# Patient Record
Sex: FEMALE | Race: BLACK OR AFRICAN AMERICAN | Employment: FULL TIME | URBAN - METROPOLITAN AREA
[De-identification: names, ages, dates, MRNs, and addresses within clinical notes are randomized per-mention and may not be internally consistent; named-entity substitution may affect disease eponyms.]

---

## 2017-01-27 RX ORDER — ERGOCALCIFEROL 1.25 MG/1
CAPSULE ORAL
Qty: 4 CAPSULE | Refills: 3 | Status: SHIPPED | OUTPATIENT
Start: 2017-01-27 | End: 2017-11-02

## 2017-04-26 ENCOUNTER — OFFICE VISIT (OUTPATIENT)
Dept: PRIMARY CARE CLINIC | Age: 52
End: 2017-04-26

## 2017-04-26 VITALS
HEART RATE: 115 BPM | OXYGEN SATURATION: 99 % | WEIGHT: 276 LBS | DIASTOLIC BLOOD PRESSURE: 80 MMHG | BODY MASS INDEX: 43.23 KG/M2 | SYSTOLIC BLOOD PRESSURE: 150 MMHG

## 2017-04-26 DIAGNOSIS — I10 ESSENTIAL HYPERTENSION: Primary | ICD-10-CM

## 2017-04-26 DIAGNOSIS — E55.9 VITAMIN D DEFICIENCY: ICD-10-CM

## 2017-04-26 DIAGNOSIS — Z13.9 SCREENING: ICD-10-CM

## 2017-04-26 DIAGNOSIS — E66.01 MORBID OBESITY DUE TO EXCESS CALORIES (HCC): ICD-10-CM

## 2017-04-26 DIAGNOSIS — D64.9 ANEMIA, UNSPECIFIED TYPE: ICD-10-CM

## 2017-04-26 DIAGNOSIS — E11.69 TYPE 2 DIABETES MELLITUS WITH OTHER SPECIFIED COMPLICATION (HCC): ICD-10-CM

## 2017-04-26 LAB — HBA1C MFR BLD: 10.2 %

## 2017-04-26 PROCEDURE — 99214 OFFICE O/P EST MOD 30 MIN: CPT | Performed by: INTERNAL MEDICINE

## 2017-04-26 PROCEDURE — 83036 HEMOGLOBIN GLYCOSYLATED A1C: CPT | Performed by: INTERNAL MEDICINE

## 2017-04-26 RX ORDER — LOSARTAN POTASSIUM AND HYDROCHLOROTHIAZIDE 12.5; 5 MG/1; MG/1
1 TABLET ORAL DAILY
Qty: 30 TABLET | Refills: 3 | Status: SHIPPED | OUTPATIENT
Start: 2017-04-26 | End: 2017-09-09 | Stop reason: SDUPTHER

## 2017-04-26 RX ORDER — GLIMEPIRIDE 4 MG/1
4 TABLET ORAL 2 TIMES DAILY
Qty: 60 TABLET | Refills: 5 | Status: SHIPPED | OUTPATIENT
Start: 2017-04-26 | End: 2017-11-02

## 2017-04-26 RX ORDER — ASPIRIN 81 MG/1
81 TABLET ORAL DAILY
Qty: 90 TABLET | Refills: 3 | Status: SHIPPED | OUTPATIENT
Start: 2017-04-26 | End: 2018-05-14 | Stop reason: SDUPTHER

## 2017-04-26 ASSESSMENT — PATIENT HEALTH QUESTIONNAIRE - PHQ9
2. FEELING DOWN, DEPRESSED OR HOPELESS: 0
1. LITTLE INTEREST OR PLEASURE IN DOING THINGS: 0
SUM OF ALL RESPONSES TO PHQ9 QUESTIONS 1 & 2: 0
SUM OF ALL RESPONSES TO PHQ QUESTIONS 1-9: 0

## 2017-04-26 ASSESSMENT — ENCOUNTER SYMPTOMS
EYES NEGATIVE: 1
ABDOMINAL PAIN: 0
WHEEZING: 0
CONSTIPATION: 1
ABDOMINAL DISTENTION: 0
CHEST TIGHTNESS: 0
COUGH: 0
BLOOD IN STOOL: 0
SHORTNESS OF BREATH: 0

## 2017-04-28 LAB — HIV-1 WESTERN BLOT: NEGATIVE

## 2017-05-17 DIAGNOSIS — I10 ESSENTIAL HYPERTENSION: ICD-10-CM

## 2017-05-17 RX ORDER — HYDROCHLOROTHIAZIDE 25 MG/1
TABLET ORAL
Qty: 30 TABLET | Refills: 3 | OUTPATIENT
Start: 2017-05-17

## 2017-06-06 ENCOUNTER — OFFICE VISIT (OUTPATIENT)
Dept: GYNECOLOGY | Age: 52
End: 2017-06-06

## 2017-06-06 VITALS
BODY MASS INDEX: 44.16 KG/M2 | SYSTOLIC BLOOD PRESSURE: 131 MMHG | DIASTOLIC BLOOD PRESSURE: 79 MMHG | TEMPERATURE: 98.3 F | HEIGHT: 67 IN | HEART RATE: 106 BPM | WEIGHT: 281.38 LBS | RESPIRATION RATE: 17 BRPM

## 2017-06-06 DIAGNOSIS — R82.90 ABNORMAL URINE ODOR: ICD-10-CM

## 2017-06-06 DIAGNOSIS — Z01.419 WELL WOMAN EXAM WITH ROUTINE GYNECOLOGICAL EXAM: Primary | ICD-10-CM

## 2017-06-06 DIAGNOSIS — D25.1 INTRAMURAL LEIOMYOMA OF UTERUS: ICD-10-CM

## 2017-06-06 DIAGNOSIS — Z30.49 ENCOUNTER FOR SURVEILLANCE OF OTHER CONTRACEPTIVE: ICD-10-CM

## 2017-06-06 PROCEDURE — 99396 PREV VISIT EST AGE 40-64: CPT | Performed by: OBSTETRICS & GYNECOLOGY

## 2017-06-06 RX ORDER — SULFAMETHOXAZOLE AND TRIMETHOPRIM 800; 160 MG/1; MG/1
1 TABLET ORAL 2 TIMES DAILY
Qty: 14 TABLET | Refills: 0 | Status: SHIPPED | OUTPATIENT
Start: 2017-06-06 | End: 2017-06-13

## 2017-06-06 RX ORDER — ACETAMINOPHEN AND CODEINE PHOSPHATE 120; 12 MG/5ML; MG/5ML
1 SOLUTION ORAL DAILY
Qty: 28 TABLET | Refills: 11 | Status: SHIPPED | OUTPATIENT
Start: 2017-06-06 | End: 2017-11-02

## 2017-06-08 LAB
HPV COMMENT: NORMAL
HPV TYPE 16: NOT DETECTED
HPV TYPE 18: NOT DETECTED
HPVOH (OTHER TYPES): NOT DETECTED

## 2017-06-09 LAB
ORGANISM: ABNORMAL
URINE CULTURE, ROUTINE: ABNORMAL

## 2017-06-10 ASSESSMENT — ENCOUNTER SYMPTOMS
GASTROINTESTINAL NEGATIVE: 1
EYES NEGATIVE: 1
RESPIRATORY NEGATIVE: 1

## 2017-06-19 ENCOUNTER — OFFICE VISIT (OUTPATIENT)
Dept: ORTHOPEDIC SURGERY | Age: 52
End: 2017-06-19

## 2017-06-19 ENCOUNTER — HOSPITAL ENCOUNTER (OUTPATIENT)
Dept: GENERAL RADIOLOGY | Facility: MEDICAL CENTER | Age: 52
Discharge: HOME | End: 2017-06-19
Attending: ORTHOPAEDIC SURGERY | Admitting: ORTHOPAEDIC SURGERY

## 2017-06-19 VITALS
HEIGHT: 67 IN | BODY MASS INDEX: 42.38 KG/M2 | WEIGHT: 270 LBS | DIASTOLIC BLOOD PRESSURE: 85 MMHG | SYSTOLIC BLOOD PRESSURE: 138 MMHG | HEART RATE: 104 BPM

## 2017-06-19 DIAGNOSIS — E66.01 MORBID OBESITY WITH BMI OF 40.0-44.9, ADULT (HCC): ICD-10-CM

## 2017-06-19 DIAGNOSIS — M77.8 CAPSULITIS OF LEFT SHOULDER: ICD-10-CM

## 2017-06-19 DIAGNOSIS — M75.122 COMPLETE TEAR OF LEFT ROTATOR CUFF: ICD-10-CM

## 2017-06-19 DIAGNOSIS — M75.22 BICEPS TENDINITIS ON LEFT: ICD-10-CM

## 2017-06-19 DIAGNOSIS — M25.512 ACUTE PAIN OF LEFT SHOULDER: Primary | ICD-10-CM

## 2017-06-19 DIAGNOSIS — M25.512 ACUTE PAIN OF LEFT SHOULDER: ICD-10-CM

## 2017-06-19 DIAGNOSIS — M19.012 ARTHRITIS OF LEFT ACROMIOCLAVICULAR JOINT: ICD-10-CM

## 2017-06-19 DIAGNOSIS — S46.212S: ICD-10-CM

## 2017-06-19 DIAGNOSIS — Z91.81 STATUS POST FALL: ICD-10-CM

## 2017-06-19 PROBLEM — S46.219A RUPTURE OF BICEPS TENDON: Status: ACTIVE | Noted: 2017-06-19

## 2017-06-19 PROCEDURE — 73030 X-RAY EXAM OF SHOULDER: CPT | Performed by: ORTHOPAEDIC SURGERY

## 2017-06-19 PROCEDURE — 99203 OFFICE O/P NEW LOW 30 MIN: CPT | Performed by: ORTHOPAEDIC SURGERY

## 2017-08-10 RX ORDER — NORETHINDRONE 0.35 MG/1
TABLET ORAL
Qty: 28 TABLET | Refills: 3 | Status: SHIPPED | OUTPATIENT
Start: 2017-08-10 | End: 2017-11-02

## 2017-08-29 RX ORDER — ERGOCALCIFEROL 1.25 MG/1
CAPSULE ORAL
Qty: 4 CAPSULE | Refills: 1 | Status: SHIPPED | OUTPATIENT
Start: 2017-08-29 | End: 2017-11-02

## 2017-09-09 DIAGNOSIS — E11.69 TYPE 2 DIABETES MELLITUS WITH OTHER SPECIFIED COMPLICATION (HCC): ICD-10-CM

## 2017-09-09 DIAGNOSIS — I10 ESSENTIAL HYPERTENSION: ICD-10-CM

## 2017-09-13 RX ORDER — LOSARTAN POTASSIUM AND HYDROCHLOROTHIAZIDE 12.5; 5 MG/1; MG/1
TABLET ORAL
Qty: 30 TABLET | Refills: 3 | Status: SHIPPED | OUTPATIENT
Start: 2017-09-13 | End: 2017-11-02

## 2017-10-23 ENCOUNTER — HOSPITAL ENCOUNTER (OUTPATIENT)
Dept: MAMMOGRAPHY | Age: 52
Discharge: HOME | End: 2017-10-23
Attending: INTERNAL MEDICINE | Admitting: INTERNAL MEDICINE

## 2017-10-23 DIAGNOSIS — Z12.31 VISIT FOR SCREENING MAMMOGRAM: ICD-10-CM

## 2017-11-02 ENCOUNTER — OFFICE VISIT (OUTPATIENT)
Dept: PRIMARY CARE CLINIC | Age: 52
End: 2017-11-02

## 2017-11-02 VITALS
WEIGHT: 271 LBS | DIASTOLIC BLOOD PRESSURE: 85 MMHG | HEIGHT: 68 IN | SYSTOLIC BLOOD PRESSURE: 160 MMHG | HEART RATE: 102 BPM | TEMPERATURE: 97.9 F | BODY MASS INDEX: 41.07 KG/M2 | OXYGEN SATURATION: 96 %

## 2017-11-02 DIAGNOSIS — Z12.31 ENCOUNTER FOR SCREENING MAMMOGRAM FOR HIGH-RISK PATIENT: ICD-10-CM

## 2017-11-02 DIAGNOSIS — Z79.4 TYPE 2 DIABETES MELLITUS WITH OTHER SPECIFIED COMPLICATION, WITH LONG-TERM CURRENT USE OF INSULIN (HCC): Primary | ICD-10-CM

## 2017-11-02 DIAGNOSIS — Z23 NEED FOR TDAP VACCINATION: ICD-10-CM

## 2017-11-02 DIAGNOSIS — Z23 NEED FOR PNEUMOCOCCAL VACCINE: ICD-10-CM

## 2017-11-02 DIAGNOSIS — Z79.4 TYPE 2 DIABETES MELLITUS WITH OTHER SPECIFIED COMPLICATION, WITH LONG-TERM CURRENT USE OF INSULIN (HCC): ICD-10-CM

## 2017-11-02 DIAGNOSIS — E11.69 TYPE 2 DIABETES MELLITUS WITH OTHER SPECIFIED COMPLICATION, WITH LONG-TERM CURRENT USE OF INSULIN (HCC): Primary | ICD-10-CM

## 2017-11-02 DIAGNOSIS — E11.69 TYPE 2 DIABETES MELLITUS WITH OTHER SPECIFIED COMPLICATION, WITH LONG-TERM CURRENT USE OF INSULIN (HCC): ICD-10-CM

## 2017-11-02 DIAGNOSIS — I10 ESSENTIAL HYPERTENSION: ICD-10-CM

## 2017-11-02 LAB
A/G RATIO: 1.3 (ref 1.1–2.2)
ALBUMIN SERPL-MCNC: 4.4 G/DL (ref 3.4–5)
ALP BLD-CCNC: 149 U/L (ref 40–129)
ALT SERPL-CCNC: 22 U/L (ref 10–40)
ANION GAP SERPL CALCULATED.3IONS-SCNC: 21 MMOL/L (ref 3–16)
AST SERPL-CCNC: 16 U/L (ref 15–37)
BASOPHILS ABSOLUTE: 0.1 K/UL (ref 0–0.2)
BASOPHILS RELATIVE PERCENT: 1.3 %
BILIRUB SERPL-MCNC: 0.3 MG/DL (ref 0–1)
BILIRUBIN URINE: NEGATIVE
BLOOD, URINE: NEGATIVE
BUN BLDV-MCNC: 12 MG/DL (ref 7–20)
CALCIUM SERPL-MCNC: 9.8 MG/DL (ref 8.3–10.6)
CHLORIDE BLD-SCNC: 96 MMOL/L (ref 99–110)
CHOLESTEROL, TOTAL: 172 MG/DL (ref 0–199)
CLARITY: CLEAR
CO2: 22 MMOL/L (ref 21–32)
COLOR: YELLOW
CREAT SERPL-MCNC: 0.6 MG/DL (ref 0.6–1.1)
CREATININE URINE: 25.4 MG/DL (ref 28–259)
EOSINOPHILS ABSOLUTE: 0.4 K/UL (ref 0–0.6)
EOSINOPHILS RELATIVE PERCENT: 4.8 %
GFR AFRICAN AMERICAN: >60
GFR NON-AFRICAN AMERICAN: >60
GLOBULIN: 3.3 G/DL
GLUCOSE BLD-MCNC: 169 MG/DL (ref 70–99)
GLUCOSE URINE: 100 MG/DL
HBA1C MFR BLD: 10.2 %
HCT VFR BLD CALC: 33.1 % (ref 36–48)
HDLC SERPL-MCNC: 63 MG/DL (ref 40–60)
HEMOGLOBIN: 10.5 G/DL (ref 12–16)
KETONES, URINE: NEGATIVE MG/DL
LDL CHOLESTEROL CALCULATED: 83 MG/DL
LEUKOCYTE ESTERASE, URINE: NEGATIVE
LYMPHOCYTES ABSOLUTE: 1.7 K/UL (ref 1–5.1)
LYMPHOCYTES RELATIVE PERCENT: 21.7 %
MCH RBC QN AUTO: 24.3 PG (ref 26–34)
MCHC RBC AUTO-ENTMCNC: 31.7 G/DL (ref 31–36)
MCV RBC AUTO: 76.5 FL (ref 80–100)
MICROALBUMIN UR-MCNC: 1.3 MG/DL
MICROALBUMIN/CREAT UR-RTO: 51.2 MG/G (ref 0–30)
MICROSCOPIC EXAMINATION: ABNORMAL
MONOCYTES ABSOLUTE: 0.5 K/UL (ref 0–1.3)
MONOCYTES RELATIVE PERCENT: 6.1 %
NEUTROPHILS ABSOLUTE: 5.1 K/UL (ref 1.7–7.7)
NEUTROPHILS RELATIVE PERCENT: 66.1 %
NITRITE, URINE: NEGATIVE
PDW BLD-RTO: 16.2 % (ref 12.4–15.4)
PH UA: 6
PLATELET # BLD: 340 K/UL (ref 135–450)
PMV BLD AUTO: 9.6 FL (ref 5–10.5)
POTASSIUM SERPL-SCNC: 4.5 MMOL/L (ref 3.5–5.1)
PROTEIN UA: NEGATIVE MG/DL
RBC # BLD: 4.33 M/UL (ref 4–5.2)
SODIUM BLD-SCNC: 139 MMOL/L (ref 136–145)
SPECIFIC GRAVITY UA: 1
TOTAL PROTEIN: 7.7 G/DL (ref 6.4–8.2)
TRIGL SERPL-MCNC: 129 MG/DL (ref 0–150)
TSH SERPL DL<=0.05 MIU/L-ACNC: 1.08 UIU/ML (ref 0.27–4.2)
URINE TYPE: ABNORMAL
UROBILINOGEN, URINE: 0.2 E.U./DL
VLDLC SERPL CALC-MCNC: 26 MG/DL
WBC # BLD: 7.6 K/UL (ref 4–11)

## 2017-11-02 PROCEDURE — 3046F HEMOGLOBIN A1C LEVEL >9.0%: CPT | Performed by: INTERNAL MEDICINE

## 2017-11-02 PROCEDURE — 3014F SCREEN MAMMO DOC REV: CPT | Performed by: INTERNAL MEDICINE

## 2017-11-02 PROCEDURE — G8427 DOCREV CUR MEDS BY ELIG CLIN: HCPCS | Performed by: INTERNAL MEDICINE

## 2017-11-02 PROCEDURE — 3017F COLORECTAL CA SCREEN DOC REV: CPT | Performed by: INTERNAL MEDICINE

## 2017-11-02 PROCEDURE — 90732 PPSV23 VACC 2 YRS+ SUBQ/IM: CPT | Performed by: INTERNAL MEDICINE

## 2017-11-02 PROCEDURE — 90715 TDAP VACCINE 7 YRS/> IM: CPT | Performed by: INTERNAL MEDICINE

## 2017-11-02 PROCEDURE — G8482 FLU IMMUNIZE ORDER/ADMIN: HCPCS | Performed by: INTERNAL MEDICINE

## 2017-11-02 PROCEDURE — 1036F TOBACCO NON-USER: CPT | Performed by: INTERNAL MEDICINE

## 2017-11-02 PROCEDURE — 83036 HEMOGLOBIN GLYCOSYLATED A1C: CPT | Performed by: INTERNAL MEDICINE

## 2017-11-02 PROCEDURE — 90471 IMMUNIZATION ADMIN: CPT | Performed by: INTERNAL MEDICINE

## 2017-11-02 PROCEDURE — 99214 OFFICE O/P EST MOD 30 MIN: CPT | Performed by: INTERNAL MEDICINE

## 2017-11-02 PROCEDURE — 90472 IMMUNIZATION ADMIN EACH ADD: CPT | Performed by: INTERNAL MEDICINE

## 2017-11-02 PROCEDURE — G8417 CALC BMI ABV UP PARAM F/U: HCPCS | Performed by: INTERNAL MEDICINE

## 2017-11-02 RX ORDER — LOSARTAN POTASSIUM AND HYDROCHLOROTHIAZIDE 25; 100 MG/1; MG/1
1 TABLET ORAL DAILY
Qty: 30 TABLET | Refills: 5 | Status: SHIPPED | OUTPATIENT
Start: 2017-11-02 | End: 2018-04-13

## 2017-11-02 ASSESSMENT — ENCOUNTER SYMPTOMS
COUGH: 0
CHEST TIGHTNESS: 0
SHORTNESS OF BREATH: 0
ABDOMINAL DISTENTION: 0
CONSTIPATION: 1
BLOOD IN STOOL: 0
EYES NEGATIVE: 1
WHEEZING: 0
ABDOMINAL PAIN: 0

## 2017-11-02 NOTE — PROGRESS NOTES
Subjective:      Patient ID: Chandan Fragoso is a 47 y.o. female. 11/2/17 Patient presents with:  Diabetes  Sugars as high as 400 ! Fed up with Bydureon and other Diabetes meds b/e of no change in sugar  Hypertension: not taking Hyzaar reg b/e she read it raises sugars! did take it this am though     Goes on [employer] and likes to follow their recommendations           Last seen  4/26/17       10/5/15  Dr Mora Hills Patient  ; establish care             Hypertension   This is a chronic problem. The problem is uncontrolled. Pertinent negatives include no headaches or shortness of breath. Diabetes   She presents for her follow-up diabetic visit. She has type 2 diabetes mellitus. Her disease course has been worsening. Hypoglycemia symptoms include mood changes. Pertinent negatives for hypoglycemia include no dizziness, headaches or nervousness/anxiousness. Associated symptoms include polydipsia and polyuria. Pertinent negatives for diabetes include no fatigue and no weakness. There are no hypoglycemic complications. Symptoms are worsening. She is compliant with treatment most of the time. She participates in exercise intermittently. An ACE inhibitor/angiotensin II receptor blocker is being taken. She does not see a podiatrist.Eye exam is current. Review of Systems   Constitutional: Negative for activity change, appetite change, fatigue, fever and unexpected weight change. Tdap 11/17     Pneumonia vac 11/17        Flu vac reg at work  10/17    HENT: Negative. Dental ex reg     Eyes: Negative. Eye ex  11/16 ; [facility]   Respiratory: Negative for cough, chest tightness, shortness of breath and wheezing. Quit smoking 6/15   Rare Etoh ; No rec drugs   Cardiovascular: Negative. No HTN / CAD   FH of HTN but no CAD    Gastrointestinal: Positive for constipation (On Amitiza ). Negative for abdominal distention, abdominal pain and blood in stool.         Colonoscopy 7/16   Nl ;  Dr Kinjal Osuna

## 2017-11-15 NOTE — TELEPHONE ENCOUNTER
Patient calling concerns about medications that should be taking and which ones that have been removed from her list please advise [phone]

## 2017-11-16 RX ORDER — PREGABALIN 25 MG/1
CAPSULE ORAL
Qty: 60 CAPSULE | Refills: 2 | Status: SHIPPED | OUTPATIENT
Start: 2017-11-16 | End: 2018-04-13

## 2017-12-01 ENCOUNTER — TELEPHONE (OUTPATIENT)
Dept: ORTHOPEDIC SURGERY | Age: 52
End: 2017-12-01

## 2017-12-01 NOTE — TELEPHONE ENCOUNTER
LM: for he patient letting her know that we did get approval for her to see Dr. Kavitha Sharp. She can call and make an appointment.

## 2017-12-06 DIAGNOSIS — E11.69 TYPE 2 DIABETES MELLITUS WITH OTHER SPECIFIED COMPLICATION (HCC): ICD-10-CM

## 2017-12-06 DIAGNOSIS — K29.50 CHRONIC GASTRITIS WITHOUT BLEEDING, UNSPECIFIED GASTRITIS TYPE: ICD-10-CM

## 2017-12-08 RX ORDER — OMEPRAZOLE 40 MG/1
40 CAPSULE, DELAYED RELEASE ORAL DAILY
Qty: 30 CAPSULE | Refills: 5 | Status: SHIPPED | OUTPATIENT
Start: 2017-12-08 | End: 2018-07-13 | Stop reason: SDUPTHER

## 2017-12-08 RX ORDER — EXENATIDE 2 MG/.65ML
2 INJECTION, SUSPENSION, EXTENDED RELEASE SUBCUTANEOUS WEEKLY
Qty: 4 PEN | Refills: 5 | Status: SHIPPED | OUTPATIENT
Start: 2017-12-08 | End: 2018-04-13

## 2017-12-20 DIAGNOSIS — E11.69 TYPE 2 DIABETES MELLITUS WITH OTHER SPECIFIED COMPLICATION (HCC): ICD-10-CM

## 2017-12-20 RX ORDER — SAXAGLIPTIN AND METFORMIN HYDROCHLORIDE 5; 1000 MG/1; MG/1
1000 TABLET, FILM COATED, EXTENDED RELEASE ORAL DAILY
Qty: 30 TABLET | Refills: 6 | Status: SHIPPED | OUTPATIENT
Start: 2017-12-20 | End: 2018-04-13 | Stop reason: SDUPTHER

## 2018-01-04 RX ORDER — ERGOCALCIFEROL 1.25 MG/1
CAPSULE ORAL
Qty: 4 CAPSULE | Refills: 1 | Status: SHIPPED | OUTPATIENT
Start: 2018-01-04 | End: 2018-02-26 | Stop reason: SDUPTHER

## 2018-01-19 ENCOUNTER — OFFICE VISIT (OUTPATIENT)
Dept: ORTHOPEDIC SURGERY | Age: 53
End: 2018-01-19

## 2018-01-19 VITALS
BODY MASS INDEX: 42.38 KG/M2 | DIASTOLIC BLOOD PRESSURE: 89 MMHG | HEIGHT: 67 IN | SYSTOLIC BLOOD PRESSURE: 146 MMHG | HEART RATE: 95 BPM | WEIGHT: 270 LBS

## 2018-01-19 DIAGNOSIS — M75.122 COMPLETE TEAR OF LEFT ROTATOR CUFF: Primary | ICD-10-CM

## 2018-01-19 PROCEDURE — 99214 OFFICE O/P EST MOD 30 MIN: CPT | Performed by: ORTHOPAEDIC SURGERY

## 2018-01-19 NOTE — PROGRESS NOTES
New Patient comes in the office for left shoulder pain. (workers compensation) She is a referral from dr Reinhold Mcardle reporting today that she fell out of a chair march 6th 2017. She then was ordered a mri which states that she have a rotator cuff tear. She notes that she feels a little better and is 65% better than what she was before her injury,but is still having tightness and weakness. Her outside x-rays from 6/19/17 was reviewed in the office today along with her mri from 3/21/2017. She denies numbness and tingling. She denies doing therapy for this issue.        - repeat mri evaluate large rotator cuff tear  - physical therapy        - patient blood pressure was elevated at today's visit

## 2018-02-13 DIAGNOSIS — E11.69 TYPE 2 DIABETES MELLITUS WITH OTHER SPECIFIED COMPLICATION (HCC): ICD-10-CM

## 2018-02-13 RX ORDER — GLIMEPIRIDE 4 MG/1
4 TABLET ORAL 2 TIMES DAILY
Qty: 60 TABLET | Refills: 5 | Status: SHIPPED | OUTPATIENT
Start: 2018-02-13 | End: 2018-04-13

## 2018-02-26 RX ORDER — NORETHINDRONE 0.35 MG/1
TABLET ORAL
Qty: 28 TABLET | Refills: 3 | Status: SHIPPED | OUTPATIENT
Start: 2018-02-26 | End: 2018-12-31 | Stop reason: ALTCHOICE

## 2018-02-26 RX ORDER — ERGOCALCIFEROL 1.25 MG/1
CAPSULE ORAL
Qty: 4 CAPSULE | Refills: 1 | Status: SHIPPED | OUTPATIENT
Start: 2018-02-26 | End: 2018-04-17 | Stop reason: SDUPTHER

## 2018-03-05 ENCOUNTER — TELEPHONE (OUTPATIENT)
Dept: ORTHOPEDIC SURGERY | Age: 53
End: 2018-03-05

## 2018-03-07 RX ORDER — DIAZEPAM 5 MG/1
TABLET ORAL
Qty: 2 TABLET | Refills: 0 | OUTPATIENT
Start: 2018-03-07 | End: 2018-03-19

## 2018-03-15 ENCOUNTER — TELEPHONE (OUTPATIENT)
Dept: ORTHOPEDIC SURGERY | Age: 53
End: 2018-03-15

## 2018-03-15 DIAGNOSIS — M75.122 COMPLETE TEAR OF LEFT ROTATOR CUFF: ICD-10-CM

## 2018-03-15 DIAGNOSIS — M77.8 CAPSULITIS OF LEFT SHOULDER: Primary | ICD-10-CM

## 2018-04-02 ENCOUNTER — HOSPITAL ENCOUNTER (OUTPATIENT)
Dept: PHYSICAL THERAPY | Facility: MEDICAL CENTER | Age: 53
Discharge: HOME | End: 2018-04-30
Attending: ORTHOPAEDIC SURGERY | Admitting: ORTHOPAEDIC SURGERY

## 2018-04-06 ENCOUNTER — HOSPITAL ENCOUNTER (OUTPATIENT)
Dept: PHYSICAL THERAPY | Facility: MEDICAL CENTER | Age: 53
Discharge: HOME | End: 2018-04-07
Admitting: ORTHOPAEDIC SURGERY

## 2018-04-10 ENCOUNTER — HOSPITAL ENCOUNTER (OUTPATIENT)
Dept: PHYSICAL THERAPY | Facility: MEDICAL CENTER | Age: 53
Discharge: HOME | End: 2018-04-11
Admitting: ORTHOPAEDIC SURGERY

## 2018-04-12 ENCOUNTER — HOSPITAL ENCOUNTER (OUTPATIENT)
Dept: PHYSICAL THERAPY | Facility: MEDICAL CENTER | Age: 53
Discharge: HOME | End: 2018-04-13
Admitting: ORTHOPAEDIC SURGERY

## 2018-04-13 ENCOUNTER — OFFICE VISIT (OUTPATIENT)
Dept: PRIMARY CARE CLINIC | Age: 53
End: 2018-04-13

## 2018-04-13 VITALS
DIASTOLIC BLOOD PRESSURE: 80 MMHG | OXYGEN SATURATION: 98 % | BODY MASS INDEX: 43.41 KG/M2 | SYSTOLIC BLOOD PRESSURE: 130 MMHG | RESPIRATION RATE: 18 BRPM | TEMPERATURE: 100.3 F | HEIGHT: 67 IN | WEIGHT: 276.6 LBS | HEART RATE: 102 BPM

## 2018-04-13 DIAGNOSIS — E11.69 TYPE 2 DIABETES MELLITUS WITH OTHER SPECIFIED COMPLICATION, WITH LONG-TERM CURRENT USE OF INSULIN (HCC): Primary | ICD-10-CM

## 2018-04-13 DIAGNOSIS — I10 ESSENTIAL HYPERTENSION: ICD-10-CM

## 2018-04-13 DIAGNOSIS — K59.00 CONSTIPATION, UNSPECIFIED CONSTIPATION TYPE: ICD-10-CM

## 2018-04-13 DIAGNOSIS — Z23 NEED FOR SHINGLES VACCINE: ICD-10-CM

## 2018-04-13 DIAGNOSIS — Z79.4 TYPE 2 DIABETES MELLITUS WITH OTHER SPECIFIED COMPLICATION, WITH LONG-TERM CURRENT USE OF INSULIN (HCC): Primary | ICD-10-CM

## 2018-04-13 LAB — HBA1C MFR BLD: 9.7 %

## 2018-04-13 PROCEDURE — 99214 OFFICE O/P EST MOD 30 MIN: CPT | Performed by: INTERNAL MEDICINE

## 2018-04-13 PROCEDURE — 83036 HEMOGLOBIN GLYCOSYLATED A1C: CPT | Performed by: INTERNAL MEDICINE

## 2018-04-13 PROCEDURE — 1036F TOBACCO NON-USER: CPT | Performed by: INTERNAL MEDICINE

## 2018-04-13 PROCEDURE — G8417 CALC BMI ABV UP PARAM F/U: HCPCS | Performed by: INTERNAL MEDICINE

## 2018-04-13 PROCEDURE — 3017F COLORECTAL CA SCREEN DOC REV: CPT | Performed by: INTERNAL MEDICINE

## 2018-04-13 PROCEDURE — 2022F DILAT RTA XM EVC RTNOPTHY: CPT | Performed by: INTERNAL MEDICINE

## 2018-04-13 PROCEDURE — 3014F SCREEN MAMMO DOC REV: CPT | Performed by: INTERNAL MEDICINE

## 2018-04-13 PROCEDURE — G8427 DOCREV CUR MEDS BY ELIG CLIN: HCPCS | Performed by: INTERNAL MEDICINE

## 2018-04-13 PROCEDURE — 3046F HEMOGLOBIN A1C LEVEL >9.0%: CPT | Performed by: INTERNAL MEDICINE

## 2018-04-13 RX ORDER — LOSARTAN POTASSIUM 100 MG/1
100 TABLET ORAL DAILY
Qty: 30 TABLET | Refills: 5 | Status: SHIPPED | OUTPATIENT
Start: 2018-04-13 | End: 2018-10-16 | Stop reason: SDUPTHER

## 2018-04-13 RX ORDER — HYDROCHLOROTHIAZIDE 25 MG/1
25 TABLET ORAL DAILY
Qty: 30 TABLET | Refills: 5 | Status: SHIPPED | OUTPATIENT
Start: 2018-04-13 | End: 2018-10-16 | Stop reason: SDUPTHER

## 2018-04-13 RX ORDER — DOCUSATE SODIUM 100 MG/1
100 CAPSULE, LIQUID FILLED ORAL 2 TIMES DAILY PRN
Qty: 50 CAPSULE | Refills: 1 | Status: SHIPPED | OUTPATIENT
Start: 2018-04-13 | End: 2019-04-21 | Stop reason: SDUPTHER

## 2018-04-13 ASSESSMENT — ENCOUNTER SYMPTOMS
CONSTIPATION: 1
COUGH: 0
EYES NEGATIVE: 1
WHEEZING: 0
SHORTNESS OF BREATH: 0
ABDOMINAL DISTENTION: 0
ABDOMINAL PAIN: 0
BLOOD IN STOOL: 0
CHEST TIGHTNESS: 0

## 2018-04-13 NOTE — FLOWSHEET NOTE
with self care, reaching, carrying, lifting, house/yardwork, driving/computer work    Modalities:    egs  Charges:  Timed Code Treatment Minutes: 40'    Total Treatment Minutes: 76'      [] EVAL  [x] RU(77498) x  2   [] IONTO  [] NMR (93562) x      [] VASO  [x] Manual (67464) x  1    [] Other:  [] TA x       [] Mech Traction (13351)  [] ES(attended) (97198)      [x] ES (un) (96835):     GOALS:'  Long Term Goals: To be achieved in: 8 weeks  - The patient is expected to demonstrate less than 22% impairment, limitation or restriction in:  - walking and moving around (mobility)  -changing and maintaining body position  - carrying, moving and handling objects. - Patient will demonstrate increased passive and active ROM to full, symmetrical and painfree to allow for proper joint functioning as indicated by patients Functional Deficits. - Patient will demonstrate an increase in Strength to symmetrical and painfree to allow for proper functional mobility as indicated by patients Functional Deficits. - Patient will return to desired, higher level upper extremity functional activities without increased symptoms or restriction. Progression Towards Functional goals:  [] Patient is progressing as expected towards functional goals listed. [] Progression is slowed due to complexities listed. [] Progression has been slowed due to co-morbidities.   [x] Plan just implemented, too soon to assess goals progression  [] Other:     ASSESSMENT:  See eval    Treatment/Activity Tolerance:  [] Patient tolerated treatment well [] Patient limited by fatique  [] Patient limited by pain  [] Patient limited by other medical complications  [] Other:     Prognosis: [] Good [] Fair  [] Poor    Patient Requires Follow-up: [x] Yes  [] No    PLAN: See eval  [x] Continue per plan of care [] Alter current plan (see comments)  [] Plan of care initiated [] Hold pending MD visit [] Discharge    Electronically signed by: Yuliya Or

## 2018-04-16 ENCOUNTER — OFFICE VISIT (OUTPATIENT)
Dept: ENDOCRINOLOGY | Age: 53
End: 2018-04-16

## 2018-04-16 VITALS
OXYGEN SATURATION: 98 % | HEART RATE: 108 BPM | SYSTOLIC BLOOD PRESSURE: 148 MMHG | WEIGHT: 276 LBS | BODY MASS INDEX: 43.32 KG/M2 | HEIGHT: 67 IN | DIASTOLIC BLOOD PRESSURE: 82 MMHG

## 2018-04-16 DIAGNOSIS — E11.69 DYSLIPIDEMIA ASSOCIATED WITH TYPE 2 DIABETES MELLITUS (HCC): ICD-10-CM

## 2018-04-16 DIAGNOSIS — Z79.4 TYPE 2 DIABETES MELLITUS WITH DIABETIC NEPHROPATHY, WITH LONG-TERM CURRENT USE OF INSULIN (HCC): Primary | ICD-10-CM

## 2018-04-16 DIAGNOSIS — I10 ESSENTIAL HYPERTENSION: ICD-10-CM

## 2018-04-16 DIAGNOSIS — E11.21 TYPE 2 DIABETES MELLITUS WITH DIABETIC NEPHROPATHY, WITH LONG-TERM CURRENT USE OF INSULIN (HCC): Primary | ICD-10-CM

## 2018-04-16 DIAGNOSIS — E66.01 MORBID OBESITY DUE TO EXCESS CALORIES (HCC): ICD-10-CM

## 2018-04-16 DIAGNOSIS — E78.5 DYSLIPIDEMIA ASSOCIATED WITH TYPE 2 DIABETES MELLITUS (HCC): ICD-10-CM

## 2018-04-16 DIAGNOSIS — L68.0 HIRSUTISM: ICD-10-CM

## 2018-04-16 PROCEDURE — 3014F SCREEN MAMMO DOC REV: CPT | Performed by: INTERNAL MEDICINE

## 2018-04-16 PROCEDURE — 3017F COLORECTAL CA SCREEN DOC REV: CPT | Performed by: INTERNAL MEDICINE

## 2018-04-16 PROCEDURE — G8417 CALC BMI ABV UP PARAM F/U: HCPCS | Performed by: INTERNAL MEDICINE

## 2018-04-16 PROCEDURE — G8427 DOCREV CUR MEDS BY ELIG CLIN: HCPCS | Performed by: INTERNAL MEDICINE

## 2018-04-16 PROCEDURE — 2022F DILAT RTA XM EVC RTNOPTHY: CPT | Performed by: INTERNAL MEDICINE

## 2018-04-16 PROCEDURE — 99244 OFF/OP CNSLTJ NEW/EST MOD 40: CPT | Performed by: INTERNAL MEDICINE

## 2018-04-16 RX ORDER — METFORMIN HYDROCHLORIDE 500 MG/1
1000 TABLET, EXTENDED RELEASE ORAL 2 TIMES DAILY WITH MEALS
Qty: 124 TABLET | Refills: 5 | Status: SHIPPED | OUTPATIENT
Start: 2018-04-16 | End: 2018-10-26 | Stop reason: SDUPTHER

## 2018-04-16 ASSESSMENT — PATIENT HEALTH QUESTIONNAIRE - PHQ9
2. FEELING DOWN, DEPRESSED OR HOPELESS: 0
1. LITTLE INTEREST OR PLEASURE IN DOING THINGS: 0
SUM OF ALL RESPONSES TO PHQ QUESTIONS 1-9: 0
SUM OF ALL RESPONSES TO PHQ9 QUESTIONS 1 & 2: 0

## 2018-04-17 ENCOUNTER — HOSPITAL ENCOUNTER (OUTPATIENT)
Dept: PHYSICAL THERAPY | Facility: MEDICAL CENTER | Age: 53
Discharge: HOME | End: 2018-04-18
Admitting: ORTHOPAEDIC SURGERY

## 2018-04-18 RX ORDER — ERGOCALCIFEROL 1.25 MG/1
CAPSULE ORAL
Qty: 4 CAPSULE | Refills: 1 | Status: SHIPPED | OUTPATIENT
Start: 2018-04-18 | End: 2018-05-14 | Stop reason: SDUPTHER

## 2018-04-19 ENCOUNTER — HOSPITAL ENCOUNTER (OUTPATIENT)
Dept: PHYSICAL THERAPY | Facility: MEDICAL CENTER | Age: 53
Discharge: HOME | End: 2018-04-20
Admitting: ORTHOPAEDIC SURGERY

## 2018-04-20 ENCOUNTER — TELEPHONE (OUTPATIENT)
Dept: PRIMARY CARE CLINIC | Age: 53
End: 2018-04-20

## 2018-04-20 RX ORDER — LACTULOSE 10 G/15ML
10 SOLUTION ORAL EVERY EVENING
Refills: 1 | Status: CANCELLED | OUTPATIENT
Start: 2018-04-20

## 2018-04-20 NOTE — FLOWSHEET NOTE
Therapeutic Exercise and NMR EXR  [x] (61081) Provided verbal/tactile cueing for activities related to strengthening, flexibility, endurance, ROM  for improvements in scapular, scapulothoracic and UE control with self care, reaching, carrying, lifting, house/yardwork, driving/computer work.    [] (86374) Provided verbal/tactile cueing for activities related to improving balance, coordination, kinesthetic sense, posture, motor skill, proprioception  to assist with  scapular, scapulothoracic and UE control with self care, reaching, carrying, lifting, house/yardwork, driving/computer work. Therapeutic Activities:    [x] (99038 or 73362) Provided verbal/tactile cueing for activities related to improving balance, coordination, kinesthetic sense, posture, motor skill, proprioception and motor activation to allow for proper function of scapular, scapulothoracic and UE control with self care, carrying, lifting, driving/computer work.      Home Exercise Program:    [x] (07260) Reviewed/Progressed HEP activities related to strengthening, flexibility, endurance, ROM of scapular, scapulothoracic and UE control with self care, reaching, carrying, lifting, house/yardwork, driving/computer work  [] (77781) Reviewed/Progressed HEP activities related to improving balance, coordination, kinesthetic sense, posture, motor skill, proprioception of scapular, scapulothoracic and UE control with self care, reaching, carrying, lifting, house/yardwork, driving/computer work      Manual Treatments:  PROM / STM / Oscillations-Mobs:  G-I, II, III, IV (PA's, Inf., Post.)  [x] (88554) Provided manual therapy to mobilize soft tissue/joints of cervical/CT, scapular GHJ and UE for the purpose of modulating pain, promoting relaxation,  increasing ROM, reducing/eliminating soft tissue swelling/inflammation/restriction, improving soft tissue extensibility and allowing for proper ROM for normal

## 2018-04-25 ENCOUNTER — HOSPITAL ENCOUNTER (OUTPATIENT)
Dept: PHYSICAL THERAPY | Facility: MEDICAL CENTER | Age: 53
Discharge: HOME | End: 2018-04-26
Admitting: ORTHOPAEDIC SURGERY

## 2018-04-26 ENCOUNTER — HOSPITAL ENCOUNTER (OUTPATIENT)
Dept: PHYSICAL THERAPY | Facility: MEDICAL CENTER | Age: 53
Discharge: HOME | End: 2018-04-27
Admitting: ORTHOPAEDIC SURGERY

## 2018-04-30 ENCOUNTER — HOSPITAL ENCOUNTER (OUTPATIENT)
Dept: PHYSICAL THERAPY | Facility: MEDICAL CENTER | Age: 53
Discharge: HOME | End: 2018-05-01
Admitting: ORTHOPAEDIC SURGERY

## 2018-05-01 ENCOUNTER — HOSPITAL ENCOUNTER (OUTPATIENT)
Dept: OTHER | Age: 53
Discharge: HOME | End: 2018-05-31
Attending: ORTHOPAEDIC SURGERY | Admitting: ORTHOPAEDIC SURGERY

## 2018-05-02 NOTE — FLOWSHEET NOTE
swelling/inflammation/restriction, improving soft tissue extensibility and allowing for proper ROM for normal function with self care, reaching, carrying, lifting, house/yardwork, driving/computer work    Modalities:  Cp     Charges:  Timed Code Treatment Minutes: 54'    Total Treatment Minutes: 12:06-1:05  79'      [] EVAL  [x] TR(75330) x  2   [] IONTO  [] NMR (88972) x      [] VASO  [x] Manual (36754) x  2    [] Other:  [] TA x       [] Mech Traction (34831)  [] ES(attended) (03060)      [] ES (un) (52593):     GOALS:'  Long Term Goals: To be achieved in: 8 weeks  - The patient is expected to demonstrate less than 22% impairment, limitation or restriction in:  - walking and moving around (mobility)  -changing and maintaining body position  - carrying, moving and handling objects. - Patient will demonstrate increased passive and active ROM to full, symmetrical and painfree to allow for proper joint functioning as indicated by patients Functional Deficits. - Patient will demonstrate an increase in Strength to symmetrical and painfree to allow for proper functional mobility as indicated by patients Functional Deficits. - Patient will return to desired, higher level upper extremity functional activities without increased symptoms or restriction. Progression Towards Functional goals:  [] Patient is progressing as expected towards functional goals listed. [] Progression is slowed due to complexities listed. [] Progression has been slowed due to co-morbidities.   [x] Plan just implemented, too soon to assess goals progression  [] Other:     ASSESSMENT:  See eval    Treatment/Activity Tolerance:  [] Patient tolerated treatment well [] Patient limited by fatique  [] Patient limited by pain  [] Patient limited by other medical complications  [] Other:     Prognosis: [] Good [] Fair  [] Poor    Patient Requires Follow-up: [x] Yes  [] No    PLAN: See eval  [x] Continue per plan of care [] Alter current plan (see comments)  [] Plan of care initiated [] Hold pending MD visit [] Discharge    Electronically signed by: Malgorzata Nick PT, MPT

## 2018-05-07 ENCOUNTER — OFFICE VISIT (OUTPATIENT)
Dept: ORTHOPEDIC SURGERY | Age: 53
End: 2018-05-07

## 2018-05-07 VITALS
HEART RATE: 104 BPM | DIASTOLIC BLOOD PRESSURE: 83 MMHG | BODY MASS INDEX: 43.32 KG/M2 | WEIGHT: 276.02 LBS | SYSTOLIC BLOOD PRESSURE: 137 MMHG | HEIGHT: 67 IN

## 2018-05-07 DIAGNOSIS — M75.122 COMPLETE TEAR OF LEFT ROTATOR CUFF: Primary | ICD-10-CM

## 2018-05-07 PROCEDURE — 99214 OFFICE O/P EST MOD 30 MIN: CPT | Performed by: ORTHOPAEDIC SURGERY

## 2018-05-14 ENCOUNTER — OFFICE VISIT (OUTPATIENT)
Dept: ENDOCRINOLOGY | Age: 53
End: 2018-05-14

## 2018-05-14 VITALS
DIASTOLIC BLOOD PRESSURE: 60 MMHG | BODY MASS INDEX: 43.54 KG/M2 | HEIGHT: 67 IN | WEIGHT: 277.4 LBS | SYSTOLIC BLOOD PRESSURE: 124 MMHG | OXYGEN SATURATION: 99 % | HEART RATE: 100 BPM

## 2018-05-14 DIAGNOSIS — E66.01 MORBID OBESITY WITH BMI OF 40.0-44.9, ADULT (HCC): ICD-10-CM

## 2018-05-14 DIAGNOSIS — L68.0 HIRSUTISM: ICD-10-CM

## 2018-05-14 DIAGNOSIS — I10 ESSENTIAL HYPERTENSION: ICD-10-CM

## 2018-05-14 DIAGNOSIS — E11.69 DYSLIPIDEMIA ASSOCIATED WITH TYPE 2 DIABETES MELLITUS (HCC): ICD-10-CM

## 2018-05-14 DIAGNOSIS — Z79.4 TYPE 2 DIABETES MELLITUS WITH DIABETIC NEPHROPATHY, WITH LONG-TERM CURRENT USE OF INSULIN (HCC): ICD-10-CM

## 2018-05-14 DIAGNOSIS — E11.9 TYPE 2 DIABETES MELLITUS WITHOUT COMPLICATION, UNSPECIFIED LONG TERM INSULIN USE STATUS: Primary | ICD-10-CM

## 2018-05-14 DIAGNOSIS — E78.5 DYSLIPIDEMIA ASSOCIATED WITH TYPE 2 DIABETES MELLITUS (HCC): ICD-10-CM

## 2018-05-14 DIAGNOSIS — E11.21 TYPE 2 DIABETES MELLITUS WITH DIABETIC NEPHROPATHY, WITH LONG-TERM CURRENT USE OF INSULIN (HCC): ICD-10-CM

## 2018-05-14 PROCEDURE — 3046F HEMOGLOBIN A1C LEVEL >9.0%: CPT | Performed by: INTERNAL MEDICINE

## 2018-05-14 PROCEDURE — 3017F COLORECTAL CA SCREEN DOC REV: CPT | Performed by: INTERNAL MEDICINE

## 2018-05-14 PROCEDURE — 99214 OFFICE O/P EST MOD 30 MIN: CPT | Performed by: INTERNAL MEDICINE

## 2018-05-14 PROCEDURE — 2022F DILAT RTA XM EVC RTNOPTHY: CPT | Performed by: INTERNAL MEDICINE

## 2018-05-14 PROCEDURE — 1036F TOBACCO NON-USER: CPT | Performed by: INTERNAL MEDICINE

## 2018-05-14 PROCEDURE — G8417 CALC BMI ABV UP PARAM F/U: HCPCS | Performed by: INTERNAL MEDICINE

## 2018-05-14 PROCEDURE — G8427 DOCREV CUR MEDS BY ELIG CLIN: HCPCS | Performed by: INTERNAL MEDICINE

## 2018-05-14 RX ORDER — OMEPRAZOLE 40 MG/1
CAPSULE, DELAYED RELEASE ORAL
COMMUNITY
Start: 2018-05-02 | End: 2018-05-14 | Stop reason: SDUPTHER

## 2018-05-14 RX ORDER — PEN NEEDLE, DIABETIC 31 GX5/16"
NEEDLE, DISPOSABLE MISCELLANEOUS
Refills: 3 | COMMUNITY
Start: 2018-04-20

## 2018-05-14 RX ORDER — INSULIN GLARGINE 100 [IU]/ML
25 INJECTION, SOLUTION SUBCUTANEOUS NIGHTLY
COMMUNITY
Start: 2018-05-08 | End: 2018-09-17 | Stop reason: SDUPTHER

## 2018-05-14 RX ORDER — ASPIRIN 81 MG/1
TABLET ORAL
COMMUNITY
Start: 2018-04-20 | End: 2018-07-30 | Stop reason: SDUPTHER

## 2018-05-14 RX ORDER — METFORMIN HYDROCHLORIDE 500 MG/1
TABLET, EXTENDED RELEASE ORAL
COMMUNITY
Start: 2018-04-16 | End: 2018-05-14 | Stop reason: SDUPTHER

## 2018-05-14 RX ORDER — ETODOLAC 500 MG/1
500 TABLET, FILM COATED ORAL DAILY
COMMUNITY
Start: 2018-05-12 | End: 2018-07-30 | Stop reason: ALTCHOICE

## 2018-05-14 RX ORDER — DULAGLUTIDE 1.5 MG/.5ML
INJECTION, SOLUTION SUBCUTANEOUS
COMMUNITY
Start: 2018-04-16 | End: 2018-05-14 | Stop reason: SDUPTHER

## 2018-05-14 RX ORDER — NORETHINDRONE 0.35 MG/1
TABLET ORAL
COMMUNITY
Start: 2018-04-17 | End: 2018-05-14 | Stop reason: SDUPTHER

## 2018-05-14 RX ORDER — ROSUVASTATIN CALCIUM 10 MG/1
10 TABLET, COATED ORAL NIGHTLY
Qty: 90 TABLET | Refills: 1 | Status: SHIPPED | OUTPATIENT
Start: 2018-05-14 | End: 2018-11-10 | Stop reason: SDUPTHER

## 2018-05-14 RX ORDER — CHOLECALCIFEROL (VITAMIN D3) 1250 MCG
CAPSULE ORAL WEEKLY
COMMUNITY
Start: 2018-04-18 | End: 2020-06-01

## 2018-05-14 RX ORDER — NORETHINDRONE 0.35 MG/1
TABLET ORAL
COMMUNITY
Start: 2018-05-12 | End: 2018-05-14 | Stop reason: SDUPTHER

## 2018-05-14 ASSESSMENT — PATIENT HEALTH QUESTIONNAIRE - PHQ9
SUM OF ALL RESPONSES TO PHQ9 QUESTIONS 1 & 2: 0
1. LITTLE INTEREST OR PLEASURE IN DOING THINGS: 0
2. FEELING DOWN, DEPRESSED OR HOPELESS: 0
SUM OF ALL RESPONSES TO PHQ QUESTIONS 1-9: 0

## 2018-05-21 DIAGNOSIS — E11.69 TYPE 2 DIABETES MELLITUS WITH OTHER SPECIFIED COMPLICATION (HCC): ICD-10-CM

## 2018-05-21 RX ORDER — ASPIRIN 81 MG/1
81 TABLET ORAL DAILY
Qty: 90 TABLET | Refills: 2 | Status: SHIPPED | OUTPATIENT
Start: 2018-05-21 | End: 2019-05-03 | Stop reason: SDUPTHER

## 2018-06-01 ENCOUNTER — HOSPITAL ENCOUNTER (OUTPATIENT)
Dept: OTHER | Age: 53
Discharge: HOME | End: 2018-06-30
Attending: ORTHOPAEDIC SURGERY | Admitting: ORTHOPAEDIC SURGERY

## 2018-06-07 RX ORDER — ERGOCALCIFEROL 1.25 MG/1
CAPSULE ORAL
Qty: 4 CAPSULE | Refills: 1 | Status: SHIPPED | OUTPATIENT
Start: 2018-06-07 | End: 2018-07-30 | Stop reason: SDUPTHER

## 2018-06-11 ENCOUNTER — OFFICE VISIT (OUTPATIENT)
Dept: ORTHOPEDIC SURGERY | Age: 53
End: 2018-06-11

## 2018-06-11 VITALS
HEART RATE: 99 BPM | BODY MASS INDEX: 42.38 KG/M2 | WEIGHT: 270 LBS | SYSTOLIC BLOOD PRESSURE: 134 MMHG | DIASTOLIC BLOOD PRESSURE: 81 MMHG | HEIGHT: 67 IN

## 2018-06-11 DIAGNOSIS — M75.122 COMPLETE TEAR OF LEFT ROTATOR CUFF: Primary | ICD-10-CM

## 2018-06-11 DIAGNOSIS — M77.8 TENDINITIS OF LEFT SHOULDER: ICD-10-CM

## 2018-06-11 DIAGNOSIS — M17.11 PATELLOFEMORAL ARTHRITIS OF RIGHT KNEE: Primary | ICD-10-CM

## 2018-06-11 PROCEDURE — G8427 DOCREV CUR MEDS BY ELIG CLIN: HCPCS | Performed by: ORTHOPAEDIC SURGERY

## 2018-06-11 PROCEDURE — 20610 DRAIN/INJ JOINT/BURSA W/O US: CPT | Performed by: ORTHOPAEDIC SURGERY

## 2018-06-11 PROCEDURE — 99214 OFFICE O/P EST MOD 30 MIN: CPT | Performed by: ORTHOPAEDIC SURGERY

## 2018-06-11 PROCEDURE — G8417 CALC BMI ABV UP PARAM F/U: HCPCS | Performed by: ORTHOPAEDIC SURGERY

## 2018-06-11 PROCEDURE — MISC265 BAUERFEIND GENUTRAIN KNEE SLEEVE: Performed by: ORTHOPAEDIC SURGERY

## 2018-06-11 PROCEDURE — 1036F TOBACCO NON-USER: CPT | Performed by: ORTHOPAEDIC SURGERY

## 2018-06-11 PROCEDURE — 3017F COLORECTAL CA SCREEN DOC REV: CPT | Performed by: ORTHOPAEDIC SURGERY

## 2018-06-18 DIAGNOSIS — M79.2 NEUROPATHIC PAIN: Primary | ICD-10-CM

## 2018-07-02 ENCOUNTER — HOSPITAL ENCOUNTER (OUTPATIENT)
Dept: PHYSICAL THERAPY | Facility: MEDICAL CENTER | Age: 53
Discharge: HOME | End: 2018-07-03
Admitting: ORTHOPAEDIC SURGERY

## 2018-07-02 ENCOUNTER — HOSPITAL ENCOUNTER (OUTPATIENT)
Dept: PHYSICAL THERAPY | Age: 53
Discharge: HOME | End: 2018-07-02
Attending: ORTHOPAEDIC SURGERY | Admitting: ORTHOPAEDIC SURGERY

## 2018-07-03 NOTE — FLOWSHEET NOTE
[facility]  Orthopaedics and Sports Rehabilitation, [facility]    Physical Therapy Daily Treatment Note  Date:  2018    Patient Name:  Daniel Burnett    :  1965  MRN: [id number]  Restrictions/Precautions:    Medical/Treatment Diagnosis Information:  · Diagnosis: M17.11 (ICD-10-CM) - Patellofemoral arthritis of right knee  · Treatment Diagnosis: right knee pain - U67.408  Insurance/Certification information:  PT Insurance Information: workers comp   Physician Information:     Plan of care signed (Y/N):     Date of Patient follow up with Physician:     G-Code (if applicable):      Date G-Code Applied:    PT G-Codes  Functional Assessment Tool Used: lefs   Score: 44%  Functional Limitation: Mobility: Walking and moving around  Mobility: Walking and Moving Around Current Status (): At least 40 percent but less than 60 percent impaired, limited or restricted  Mobility: Walking and Moving Around Goal Status ():  At least 20 percent but less than 40 percent impaired, limited or restricted    Progress Note: [x]  Yes  []  No  Next due by: Visit #10       Latex Allergy:  [x]NO      []YES   Preferred Language for Healthcare:   [x]English       []other:     Visit # Insurance Allowable   1 Pending       Pain level:  7/10     SUBJECTIVE:  See eval    OBJECTIVE: See eval  Observation:   Test measurements:      RESTRICTIONS/PRECAUTIONS:      Exercises/Interventions:     Therapeutic Ex Sets/sec Reps Notes   Seated belt stretches   6 x 20\"     Quad sets   25x    slr's (flex, abd)   25x     saq   30x 2#    Clamshells   30x grey     Bridges   20x     Supine self itb: short, long   4 x 20\", 4 x 20\"    laq   30x 3#     Heel slides (with ball, without ball)   10 x 10\" each                                               Manual Intervention      Prom/stm   15'                                                                                                   Therapeutic Exercise and NMR EXR  [x] (25852) Provided verbal/tactile cueing for activities related to strengthening, flexibility, endurance, ROM for improvements in LE, proximal hip, and core control with self care, mobility, lifting, ambulation.  [] (22139) Provided verbal/tactile cueing for activities related to improving balance, coordination, kinesthetic sense, posture, motor skill, proprioception  to assist with LE, proximal hip, and core control in self care, mobility, lifting, ambulation and eccentric single leg control.      NMR and Therapeutic Activities:    [x] (09439 or 60740) Provided verbal/tactile cueing for activities related to improving balance, coordination, kinesthetic sense, posture, motor skill, proprioception and motor activation to allow for proper function of core, proximal hip and LE with self care and ADLs  [] (63491) Gait Re-education- Provided training and instruction to the patient for proper LE, core and proximal hip recruitment and positioning and eccentric body weight control with ambulation re-education including up and down stairs     Home Exercise Program:    [x] (87127) Reviewed/Progressed HEP activities related to strengthening, flexibility, endurance, ROM of core, proximal hip and LE for functional self-care, mobility, lifting and ambulation/stair navigation   [] (42365)Reviewed/Progressed HEP activities related to improving balance, coordination, kinesthetic sense, posture, motor skill, proprioception of core, proximal hip and LE for self care, mobility, lifting, and ambulation/stair navigation      Manual Treatments:  PROM / STM / Oscillations-Mobs:  G-I, II, III, IV (PA's, Inf., Post.)  [x] (28886) Provided manual therapy to mobilize LE, proximal hip and/or LS spine soft tissue/joints for the purpose of modulating pain, promoting relaxation,  increasing ROM, reducing/eliminating soft tissue swelling/inflammation/restriction, improving soft tissue extensibility and allowing for proper ROM for normal function with self care, mobility, lifting and ambulation. Modalities:    egs  Charges:  Timed Code Treatment Minutes: 43'    Total Treatment Minutes: 68'      [x] EVAL  [x] MZ(79607) x  1   [] IONTO  [] NMR (21260) x      [] VASO  [x] Manual (11005) x  1    [] Other:  [] TA x       [] Mech Traction (66488)  [x] ES(attended) (29294)      [] ES (un) (91907):     GOALS:        Long Term Goals: To be achieved in: 12 weeks  - The patient is expected to demonstrate less than 23% impairment, limitation or restriction in:  - walking and moving around (mobility)  -changing and maintaining body position  - carrying, moving and handling objects. - Patient will demonstrate increased passive and active ROM to full, symmetrical and painfree to allow for proper joint functioning as indicated by patients Functional Deficits. - Patient will demonstrate an increase in Strength to full and painfree to resistance testing to allow for proper functional mobility as indicated by patients Functional Deficits. - Patient will return to desired, higher level,  functional activities without increased symptoms or restriction.                        Progression Towards Functional goals:  [] Patient is progressing as expected towards functional goals listed. [] Progression is slowed due to complexities listed. [] Progression has been slowed due to co-morbidities.   [x] Plan just implemented, too soon to assess goals progression  [] Other:     ASSESSMENT:  See eval    Treatment/Activity Tolerance:  [x] Patient tolerated treatment well [] Patient limited by fatique  [] Patient limited by pain  [] Patient limited by other medical complications  [] Other:     Prognosis: [x] Good [] Fair  [] Poor    Patient Requires Follow-up: [x] Yes  [] No    PLAN: See eval  [] Continue per plan of care [] Alter current plan (see comments)  [x] Plan of care initiated [] Hold pending MD visit [] Discharge    Electronically signed by: Malgorzata Nick PT, MPT

## 2018-07-13 DIAGNOSIS — K29.50 CHRONIC GASTRITIS WITHOUT BLEEDING, UNSPECIFIED GASTRITIS TYPE: ICD-10-CM

## 2018-07-13 RX ORDER — OMEPRAZOLE 40 MG/1
40 CAPSULE, DELAYED RELEASE ORAL DAILY
Qty: 30 CAPSULE | Refills: 5 | Status: SHIPPED | OUTPATIENT
Start: 2018-07-13 | End: 2019-01-10 | Stop reason: SDUPTHER

## 2018-07-20 ENCOUNTER — HOSPITAL ENCOUNTER (OUTPATIENT)
Dept: PHYSICAL THERAPY | Age: 53
Setting detail: THERAPIES SERIES
Discharge: HOME | End: 2018-07-20
Payer: COMMERCIAL

## 2018-07-20 PROCEDURE — 97110 THERAPEUTIC EXERCISES: CPT | Performed by: PHYSICAL THERAPIST

## 2018-07-20 PROCEDURE — G0283 ELEC STIM OTHER THAN WOUND: HCPCS | Performed by: PHYSICAL THERAPIST

## 2018-07-20 PROCEDURE — 97140 MANUAL THERAPY 1/> REGIONS: CPT | Performed by: PHYSICAL THERAPIST

## 2018-07-21 NOTE — FLOWSHEET NOTE
[address] [facility]    Physical Therapy Daily Treatment Note  Date:  2018  Patient Name:  Annie Franco    :  1965  MRN: [id number]  Restrictions/Precautions:    Medical/Treatment Diagnosis Information:  · Diagnosis: M17.11 (ICD-10-CM) - Patellofemoral arthritis of right knee  · Treatment Diagnosis: right knee pain - R69.821  Insurance/Certification information:  PT Insurance Information: workers comp   Physician Information:     Plan of care signed (Y/N):     Date of Patient follow up with Physician:     G-Code (if applicable):      Date G-Code Applied:    PT G-Codes  Functional Assessment Tool Used: lefs   Score: 44%  Functional Limitation: Mobility: Walking and moving around  Mobility: Walking and Moving Around Current Status (): At least 40 percent but less than 60 percent impaired, limited or restricted  Mobility: Walking and Moving Around Goal Status (): At least 20 percent but less than 40 percent impaired, limited or restricted    Progress Note: [x]  Yes  []  No  Next due by: Visit #10       Latex Allergy:  [x]NO      []YES   Preferred Language for Healthcare:   [x]English       []other:     Visit # Insurance Allowable   3 Pending       Pain level:  4/10     SUBJECTIVE:  \"I am doing well overall. .     OBJECTIVE:   Observation: 4-/5 right quad   Test measurements:      RESTRICTIONS/PRECAUTIONS:      Exercises/Interventions:     Therapeutic Ex Sets/sec Reps Notes   Seated belt stretches   6 x 20\"     Quad sets   25x    slr's (flex, abd)   25x 1.5#    saq   30x 2#    Clamshells   30x grey     Bridges   20x     Supine self itb: short, long   4 x 20\", 4 x 20\"    laq   30x 4#     Heel slides (with ball, without ball)   10 x 10\" each     slt brd   4x                Bike   8'                       Manual Intervention      Prom/stm   15' Therapeutic Exercise and NMR EXR  [x] (31795) Provided verbal/tactile cueing for activities related to strengthening, flexibility, endurance, ROM for improvements in LE, proximal hip, and core control with self care, mobility, lifting, ambulation.  [] (61405) Provided verbal/tactile cueing for activities related to improving balance, coordination, kinesthetic sense, posture, motor skill, proprioception  to assist with LE, proximal hip, and core control in self care, mobility, lifting, ambulation and eccentric single leg control.      NMR and Therapeutic Activities:    [x] (02429 or 41825) Provided verbal/tactile cueing for activities related to improving balance, coordination, kinesthetic sense, posture, motor skill, proprioception and motor activation to allow for proper function of core, proximal hip and LE with self care and ADLs  [] (03531) Gait Re-education- Provided training and instruction to the patient for proper LE, core and proximal hip recruitment and positioning and eccentric body weight control with ambulation re-education including up and down stairs     Home Exercise Program:    [x] (58240) Reviewed/Progressed HEP activities related to strengthening, flexibility, endurance, ROM of core, proximal hip and LE for functional self-care, mobility, lifting and ambulation/stair navigation   [] (49259)Reviewed/Progressed HEP activities related to improving balance, coordination, kinesthetic sense, posture, motor skill, proprioception of core, proximal hip and LE for self care, mobility, lifting, and ambulation/stair navigation      Manual Treatments:  PROM / STM / Oscillations-Mobs:  G-I, II, III, IV (PA's, Inf., Post.)  [x] (77045) Provided manual therapy to mobilize LE, proximal hip and/or LS spine soft tissue/joints for the purpose of modulating pain, promoting relaxation,  increasing ROM, reducing/eliminating soft tissue swelling/inflammation/restriction, improving soft tissue extensibility and allowing for proper ROM for normal function with self care, mobility, lifting and ambulation. Modalities:    Cp/egs  Charges:  Timed Code Treatment Minutes: 43'    Total Treatment Minutes: 68'      [] EVAL  [x] ZF(71914) x  1   [] IONTO  [] NMR (81105) x      [] VASO  [x] Manual (71331) x  1    [] Other:  [] TA x       [] Mech Traction (20517)  [] ES(attended) (19452)      [x] ES (un) (91577):     GOALS:        Long Term Goals: To be achieved in: 12 weeks  - The patient is expected to demonstrate less than 23% impairment, limitation or restriction in:  - walking and moving around (mobility)  -changing and maintaining body position  - carrying, moving and handling objects. - Patient will demonstrate increased passive and active ROM to full, symmetrical and painfree to allow for proper joint functioning as indicated by patients Functional Deficits. - Patient will demonstrate an increase in Strength to full and painfree to resistance testing to allow for proper functional mobility as indicated by patients Functional Deficits. - Patient will return to desired, higher level,  functional activities without increased symptoms or restriction.                        Progression Towards Functional goals:  [] Patient is progressing as expected towards functional goals listed. [] Progression is slowed due to complexities listed. [] Progression has been slowed due to co-morbidities.   [x] Plan just implemented, too soon to assess goals progression  [] Other:     ASSESSMENT:  See eval    Treatment/Activity Tolerance:  [x] Patient tolerated treatment well [] Patient limited by fatique  [] Patient limited by pain  [] Patient limited by other medical complications  [] Other:     Prognosis: [x] Good [] Fair  [] Poor    Patient Requires Follow-up: [x] Yes  [] No    PLAN:   [x] Continue per plan of care [] Alter current plan (see comments)  [] Plan of care initiated [] Hold pending MD visit [] Discharge    Electronically

## 2018-07-23 ENCOUNTER — OFFICE VISIT (OUTPATIENT)
Dept: ORTHOPEDIC SURGERY | Age: 53
End: 2018-07-23

## 2018-07-23 VITALS
BODY MASS INDEX: 42.38 KG/M2 | SYSTOLIC BLOOD PRESSURE: 126 MMHG | HEART RATE: 107 BPM | HEIGHT: 67 IN | DIASTOLIC BLOOD PRESSURE: 74 MMHG | WEIGHT: 270 LBS

## 2018-07-23 DIAGNOSIS — M25.561 RIGHT KNEE PAIN, UNSPECIFIED CHRONICITY: ICD-10-CM

## 2018-07-23 DIAGNOSIS — M17.11 PATELLOFEMORAL ARTHRITIS OF RIGHT KNEE: Primary | ICD-10-CM

## 2018-07-23 PROCEDURE — G8427 DOCREV CUR MEDS BY ELIG CLIN: HCPCS | Performed by: ORTHOPAEDIC SURGERY

## 2018-07-23 PROCEDURE — 1036F TOBACCO NON-USER: CPT | Performed by: ORTHOPAEDIC SURGERY

## 2018-07-23 PROCEDURE — 99214 OFFICE O/P EST MOD 30 MIN: CPT | Performed by: ORTHOPAEDIC SURGERY

## 2018-07-23 PROCEDURE — G8417 CALC BMI ABV UP PARAM F/U: HCPCS | Performed by: ORTHOPAEDIC SURGERY

## 2018-07-23 PROCEDURE — 3017F COLORECTAL CA SCREEN DOC REV: CPT | Performed by: ORTHOPAEDIC SURGERY

## 2018-07-23 RX ORDER — CELECOXIB 200 MG/1
200 CAPSULE ORAL DAILY
Qty: 30 CAPSULE | Refills: 2 | Status: SHIPPED | OUTPATIENT
Start: 2018-07-23 | End: 2018-12-23 | Stop reason: SDUPTHER

## 2018-07-23 NOTE — PROGRESS NOTES
46yo female comes in today for continued right knee pain. She states she stopped taking the diclofenac because she didn't notice any relief. The Genutrain increased her pain so she bought a brace from the store. She was unable to get the cortisone injection due to her diabetes. Her pain is primarily on the medial aspect of her knee. The patient is interested in switching to Celebrex as she has had this in the past and it worked for her. New xrays obtained today showing arthritis, right knee.      Arthritis, right knee     -precert for lubricant  -celebrex 200mg

## 2018-07-23 NOTE — PROGRESS NOTES
Chief complaint is right knee pain. Patient is a very pleasant 60-year-old lady who has been evaluated and found to have patellofemoral arthritis her right knee. Her knee was aspirated and found to have some blood at the last visit. She was not given a steroid injection. She was sent physical therapy and has seen some improvement but continues to have significant discomfort but is present with day-to-day activities as well as standing and walking. No new injuries. No giveaway no locking of says.     Pain Assessment  Location of Pain: Knee  Location Modifiers: Right  Severity of Pain: 6  Quality of Pain: Sharp, Aching  Duration of Pain: Persistent  Frequency of Pain: Several times daily  Aggravating Factors:  (depends)  Limiting Behavior: Some  Relieving Factors:  (rubbing it with rub soothes doesn't stop pain/PT)  Result of Injury: No  Work-Related Injury: No  Are there other pain locations you wish to document?: No    Past Medical History:   Diagnosis Date    Arthritis of left acromioclavicular joint 6/19/2017    Essential hypertension 4/16/2018    Fibroids     uterine    Type II or unspecified type diabetes mellitus without mention of complication, not stated as uncontrolled         Past Surgical History:   Procedure Laterality Date    COLONOSCOPY  5/23/16    incomplete, Dr Holden Blas         Family History   Problem Relation Age of Onset    Diabetes Mother     High Blood Pressure Mother     High Cholesterol Mother     High Blood Pressure Father     Heart Disease Sister     Diabetes Maternal Aunt     Stroke Maternal Aunt     Heart Disease Maternal Uncle     Cancer Maternal Uncle     Cancer Maternal Grandmother     Diabetes Maternal Grandfather     Diabetes Maternal Aunt     Stroke Maternal Aunt     Diabetes Maternal Aunt     Stroke Maternal Aunt     Heart Disease Maternal Aunt     No Known Problems Brother     No Known Problems Paternal Aunt     No Known

## 2018-07-24 ENCOUNTER — TELEPHONE (OUTPATIENT)
Dept: ORTHOPEDIC SURGERY | Age: 53
End: 2018-07-24

## 2018-07-24 ENCOUNTER — HOSPITAL ENCOUNTER (OUTPATIENT)
Dept: PHYSICAL THERAPY | Age: 53
Setting detail: THERAPIES SERIES
Discharge: HOME | End: 2018-07-24
Payer: COMMERCIAL

## 2018-07-24 PROCEDURE — 97110 THERAPEUTIC EXERCISES: CPT | Performed by: PHYSICAL THERAPIST

## 2018-07-24 PROCEDURE — G0283 ELEC STIM OTHER THAN WOUND: HCPCS | Performed by: PHYSICAL THERAPIST

## 2018-07-24 PROCEDURE — 97140 MANUAL THERAPY 1/> REGIONS: CPT | Performed by: PHYSICAL THERAPIST

## 2018-07-26 DIAGNOSIS — E11.21 TYPE 2 DIABETES MELLITUS WITH DIABETIC NEPHROPATHY, WITH LONG-TERM CURRENT USE OF INSULIN (HCC): ICD-10-CM

## 2018-07-26 DIAGNOSIS — L68.0 HIRSUTISM: ICD-10-CM

## 2018-07-26 DIAGNOSIS — Z79.4 TYPE 2 DIABETES MELLITUS WITH DIABETIC NEPHROPATHY, WITH LONG-TERM CURRENT USE OF INSULIN (HCC): ICD-10-CM

## 2018-07-26 DIAGNOSIS — E11.9 TYPE 2 DIABETES MELLITUS WITHOUT COMPLICATION, UNSPECIFIED LONG TERM INSULIN USE STATUS: ICD-10-CM

## 2018-07-26 LAB
A/G RATIO: 1.4 (ref 1.1–2.2)
ALBUMIN SERPL-MCNC: 4.1 G/DL (ref 3.4–5)
ALP BLD-CCNC: 125 U/L (ref 40–129)
ALT SERPL-CCNC: 16 U/L (ref 10–40)
ANION GAP SERPL CALCULATED.3IONS-SCNC: 15 MMOL/L (ref 3–16)
AST SERPL-CCNC: 12 U/L (ref 15–37)
BILIRUB SERPL-MCNC: <0.2 MG/DL (ref 0–1)
BUN BLDV-MCNC: 19 MG/DL (ref 7–20)
CALCIUM SERPL-MCNC: 9.6 MG/DL (ref 8.3–10.6)
CHLORIDE BLD-SCNC: 101 MMOL/L (ref 99–110)
CO2: 22 MMOL/L (ref 21–32)
CREAT SERPL-MCNC: 0.8 MG/DL (ref 0.6–1.1)
CREATININE URINE: 157.8 MG/DL (ref 28–259)
FOLLICLE STIMULATING HORMONE: 11.5 MIU/ML
GFR AFRICAN AMERICAN: >60
GFR NON-AFRICAN AMERICAN: >60
GLOBULIN: 3 G/DL
GLUCOSE BLD-MCNC: 249 MG/DL (ref 70–99)
LUTEINIZING HORMONE: 14.9 MIU/ML
MICROALBUMIN UR-MCNC: <1.2 MG/DL
MICROALBUMIN/CREAT UR-RTO: NORMAL MG/G (ref 0–30)
POTASSIUM SERPL-SCNC: 4.6 MMOL/L (ref 3.5–5.1)
PROLACTIN: 13.9 NG/ML
SODIUM BLD-SCNC: 138 MMOL/L (ref 136–145)
TOTAL PROTEIN: 7.1 G/DL (ref 6.4–8.2)

## 2018-07-27 ENCOUNTER — HOSPITAL ENCOUNTER (OUTPATIENT)
Dept: PHYSICAL THERAPY | Age: 53
Setting detail: THERAPIES SERIES
Discharge: HOME | End: 2018-07-27
Payer: COMMERCIAL

## 2018-07-27 LAB
ESTIMATED AVERAGE GLUCOSE: 203 MG/DL
HBA1C MFR BLD: 8.7 %

## 2018-07-27 PROCEDURE — 97140 MANUAL THERAPY 1/> REGIONS: CPT | Performed by: PHYSICAL THERAPIST

## 2018-07-27 PROCEDURE — 97110 THERAPEUTIC EXERCISES: CPT | Performed by: PHYSICAL THERAPIST

## 2018-07-28 LAB
SEX HORMONE BINDING GLOBULIN: 18 NMOL/L (ref 30–135)
TESTOSTERONE FREE-NONMALE: 5.8 PG/ML (ref 0.6–3.8)
TESTOSTERONE TOTAL: 24 NG/DL (ref 20–70)

## 2018-07-29 LAB
ANDROSTENEDIONE: 0.55 NG/ML (ref 0.13–0.82)
DHEA: 0.94 NG/ML (ref 0.63–4.7)

## 2018-07-30 ENCOUNTER — OFFICE VISIT (OUTPATIENT)
Dept: ENDOCRINOLOGY | Age: 53
End: 2018-07-30

## 2018-07-30 VITALS
BODY MASS INDEX: 42.85 KG/M2 | SYSTOLIC BLOOD PRESSURE: 136 MMHG | DIASTOLIC BLOOD PRESSURE: 80 MMHG | WEIGHT: 273 LBS | OXYGEN SATURATION: 98 % | HEART RATE: 94 BPM | HEIGHT: 67 IN

## 2018-07-30 DIAGNOSIS — L68.0 HIRSUTISM: ICD-10-CM

## 2018-07-30 DIAGNOSIS — E11.21 TYPE 2 DIABETES MELLITUS WITH DIABETIC NEPHROPATHY, WITH LONG-TERM CURRENT USE OF INSULIN (HCC): Primary | ICD-10-CM

## 2018-07-30 DIAGNOSIS — E66.01 MORBID OBESITY DUE TO EXCESS CALORIES (HCC): ICD-10-CM

## 2018-07-30 DIAGNOSIS — E11.69 DYSLIPIDEMIA ASSOCIATED WITH TYPE 2 DIABETES MELLITUS (HCC): ICD-10-CM

## 2018-07-30 DIAGNOSIS — Z79.4 TYPE 2 DIABETES MELLITUS WITH DIABETIC NEPHROPATHY, WITH LONG-TERM CURRENT USE OF INSULIN (HCC): Primary | ICD-10-CM

## 2018-07-30 DIAGNOSIS — E78.5 DYSLIPIDEMIA ASSOCIATED WITH TYPE 2 DIABETES MELLITUS (HCC): ICD-10-CM

## 2018-07-30 DIAGNOSIS — I10 ESSENTIAL HYPERTENSION: ICD-10-CM

## 2018-07-30 LAB
17-OH PROGESTERONE LCMS: 60.66 NG/DL
DHEAS (DHEA SULFATE): 74.7 UG/DL (ref 26–200)

## 2018-07-30 PROCEDURE — 2022F DILAT RTA XM EVC RTNOPTHY: CPT | Performed by: INTERNAL MEDICINE

## 2018-07-30 PROCEDURE — 3045F PR MOST RECENT HEMOGLOBIN A1C LEVEL 7.0-9.0%: CPT | Performed by: INTERNAL MEDICINE

## 2018-07-30 PROCEDURE — 3017F COLORECTAL CA SCREEN DOC REV: CPT | Performed by: INTERNAL MEDICINE

## 2018-07-30 PROCEDURE — 99214 OFFICE O/P EST MOD 30 MIN: CPT | Performed by: INTERNAL MEDICINE

## 2018-07-30 PROCEDURE — 1036F TOBACCO NON-USER: CPT | Performed by: INTERNAL MEDICINE

## 2018-07-30 PROCEDURE — G8417 CALC BMI ABV UP PARAM F/U: HCPCS | Performed by: INTERNAL MEDICINE

## 2018-07-30 PROCEDURE — G8427 DOCREV CUR MEDS BY ELIG CLIN: HCPCS | Performed by: INTERNAL MEDICINE

## 2018-07-30 RX ORDER — SPIRONOLACTONE 50 MG/1
50 TABLET, FILM COATED ORAL DAILY
Qty: 90 TABLET | Refills: 1 | Status: SHIPPED | OUTPATIENT
Start: 2018-07-30 | End: 2019-02-27 | Stop reason: SDUPTHER

## 2018-07-30 ASSESSMENT — PATIENT HEALTH QUESTIONNAIRE - PHQ9
1. LITTLE INTEREST OR PLEASURE IN DOING THINGS: 0
2. FEELING DOWN, DEPRESSED OR HOPELESS: 0
SUM OF ALL RESPONSES TO PHQ9 QUESTIONS 1 & 2: 0
SUM OF ALL RESPONSES TO PHQ QUESTIONS 1-9: 0

## 2018-07-30 NOTE — PROGRESS NOTES
Patient ID:   Chaka Vásquez is a 47 y.o. female    Chief Complaint:   Chaka Vásquez presents for an evaluation of Type 2 Diabetes Mellitus , Hyperlipidemia and hypertension. Subjective:   Type 2 Diabetes Mellitus diagnosed at age 30-33. On insulin since Nov 2017. Invokana caused UTI in past.     Metformin ER 500mg, two tabs twice a day    Trulicity 6.3SC weekly    Lantus 20 units this am and 10 units after dinner. Denies missing the dose. Checks blood sugars 2 times per day. Reviewed/Reported. AM: [id number]  Lunch: [phone]  Supper: [phone]  HS:  164-174    Hypoglycemias: none     Meals: Three, dinner is late 7-9 pm as she prepares for him. Occasional snacks (chips, pretzels). Diet pepsi. Exercise: None     Denies chest pain, exertional dyspnea. Family history of CAD: in multiple family members   Denies smoking/alcohol.    Currently on ASA 81 mg daily     The following portions of the patient's history were reviewed and updated as appropriate:       Family History   Problem Relation Age of Onset    Diabetes Mother     High Blood Pressure Mother     High Cholesterol Mother     High Blood Pressure Father     Heart Disease Sister     Diabetes Maternal Aunt     Stroke Maternal Aunt     Heart Disease Maternal Uncle     Cancer Maternal Uncle     Cancer Maternal Grandmother     Diabetes Maternal Grandfather     Diabetes Maternal Aunt     Stroke Maternal Aunt     Diabetes Maternal Aunt     Stroke Maternal Aunt     Heart Disease Maternal Aunt     No Known Problems Brother     No Known Problems Paternal Aunt     No Known Problems Paternal Uncle     No Known Problems Paternal Grandmother     No Known Problems Paternal Grandfather     No Known Problems Other     Rheum Arthritis Neg Hx     Osteoarthritis Neg Hx     Asthma Neg Hx     Breast Cancer Neg Hx     Heart Failure Neg Hx     Hypertension Neg Hx     Migraines Neg Hx     Ovarian Cancer Neg Hx     Rashes/Skin Problems Neg Hx     Seizures Neg Hx     Thyroid Disease Neg Hx          Social History     Social History    Marital status: Single     Spouse name: N/A    Number of children: N/A    Years of education: N/A     Occupational History    Not on file.      Social History Main Topics    Smoking status: Former Smoker     Packs/day: 0.25     Years: 10.00    Smokeless tobacco: Former User     Quit date: 6/6/2014      Comment: quit 2 years ago    Alcohol use 0.0 oz/week      Comment: socially    Drug use: No    Sexual activity: Not Currently     Partners: Male     Other Topics Concern    Not on file     Social History Narrative    No narrative on file       Past Medical History:   Diagnosis Date    Arthritis of left acromioclavicular joint 6/19/2017    Essential hypertension 4/16/2018    Fibroids     uterine    Type II or unspecified type diabetes mellitus without mention of complication, not stated as uncontrolled        Past Surgical History:   Procedure Laterality Date    COLONOSCOPY  5/23/16    incomplete, Dr Gloria Alcala         No Known Allergies      Current Outpatient Prescriptions:     spironolactone (ALDACTONE) 50 MG tablet, Take 1 tablet by mouth daily, Disp: 90 tablet, Rfl: 1    celecoxib (CELEBREX) 200 MG capsule, Take 1 capsule by mouth daily, Disp: 30 capsule, Rfl: 2    omeprazole (PRILOSEC) 40 MG delayed release capsule, TAKE 1 CAPSULE BY MOUTH DAILY, Disp: 30 capsule, Rfl: 5    aspirin 81 MG EC tablet, TAKE 1 TABLET BY MOUTH DAILY, Disp: 90 tablet, Rfl: 2    Cholecalciferol (VITAMIN D3) 03782 units CAPS, once a week , Disp: , Rfl:     BASAGLAR KWIKPEN 100 UNIT/ML injection pen, Inject 25 Units into the skin nightly , Disp: , Rfl:     B-D ULTRAFINE III SHORT PEN 31G X 8 MM MISC, USE AS DIRECTED TO INJECT INSULIN EVERY DAY, Disp: , Rfl: 3    rosuvastatin (CRESTOR) 10 MG tablet, Take 1 tablet by mouth nightly, Disp: 90 tablet, Rfl: 1    metFORMIN (GLUCOPHAGE-XR) Left Arm, Position: Sitting, Cuff Size: Large Adult)   Pulse 94   Ht 5' 7\" (1.702 m)   Wt 273 lb (123.8 kg)   LMP 07/30/2018   SpO2 98%   Breastfeeding? No   BMI 42.76 kg/m²   Constitutional: Patient is oriented to person, place, and time. Patient appears well-developed and well-nourished. HENT:    Head: Normocephalic and atraumatic. Eyes: Conjunctivae and EOM are normal. Pupils are equal, round, and reactive to light. Neck: Normal range of motion. Cardiovascular: Normal rate, regular rhythm and normal heart sounds. Pulmonary/Chest: Effort normal and breath sounds normal.   Abdominal: Soft. Bowel sounds are normal.   Musculoskeletal: Normal range of motion. Neurological: Patient is alert and oriented to person, place, and time. Patient has normal reflexes. Skin: Skin is warm and dry. Psychiatric: Patient has a normal mood and affect.  Patient behavior is normal.     Lab Review:    Orders Only on 07/26/2018   Component Date Value Ref Range Status    Hemoglobin A1C 07/26/2018 8.7  See comment % Final    eAG 07/26/2018 203.0  mg/dL Final    Microalbumin, Random Urine 07/26/2018 <1.20  <2.0 mg/dL Final    Creatinine, Ur 07/26/2018 157.8  28.0 - 259.0 mg/dL Final    Microalbumin Creatinine Ratio 07/26/2018 see below  0.0 - 30.0 mg/g Final    DHEAS (DHEA Sulfate) 07/26/2018 74.7  26 - 200 ug/dL Final    17-OH PROGESTERONE LCMS 07/26/2018 60.66  <=206.00 ng/dL Final    Testosterone 07/26/2018 24  20 - 70 ng/dL Final    Sex Hormone Binding 07/26/2018 18* 30 - 135 nmol/L Final    Testosterone, Free 07/26/2018 5.8* 0.6 - 3.8 pg/mL Final    DHEA (Dehydroepiandrosterone) 07/26/2018 0.945  0.630 - 4.700 ng/mL Final    Prolactin 07/26/2018 13.9  ng/mL Final    Androstenedione 07/26/2018 0.546  0.130 - 0.820 ng/mL Final    LH 07/26/2018 14.9  mIU/mL Final    FSH 07/26/2018 11.5  mIU/mL Final    Sodium 07/26/2018 138  136 - 145 mmol/L Final    Potassium 07/26/2018 4.6  3.5 - 5.1 mmol/L Final    Chloride 07/26/2018 101  99 - 110 mmol/L Final    CO2 07/26/2018 22  21 - 32 mmol/L Final    Anion Gap 07/26/2018 15  3 - 16 Final    Glucose 07/26/2018 249* 70 - 99 mg/dL Final    BUN 07/26/2018 19  7 - 20 mg/dL Final    CREATININE 07/26/2018 0.8  0.6 - 1.1 mg/dL Final    GFR Non- 07/26/2018 >60  >60 Final    GFR  07/26/2018 >60  >60 Final    Calcium 07/26/2018 9.6  8.3 - 10.6 mg/dL Final    Total Protein 07/26/2018 7.1  6.4 - 8.2 g/dL Final    Alb 07/26/2018 4.1  3.4 - 5.0 g/dL Final    Albumin/Globulin Ratio 07/26/2018 1.4  1.1 - 2.2 Final    Total Bilirubin 07/26/2018 <0.2  0.0 - 1.0 mg/dL Final    Alkaline Phosphatase 07/26/2018 125  40 - 129 U/L Final    ALT 07/26/2018 16  10 - 40 U/L Final    AST 07/26/2018 12* 15 - 37 U/L Final    Globulin 07/26/2018 3.0  g/dL Final   Office Visit on 04/13/2018   Component Date Value Ref Range Status    Hemoglobin A1C 04/13/2018 9.7  % Final   Orders Only on 11/02/2017   Component Date Value Ref Range Status    Microalbumin, Random Urine 11/02/2017 1.30  <2.0 mg/dL Final    Creatinine, Ur 11/02/2017 25.4* 28.0 - 259.0 mg/dL Final    Microalbumin Creatinine Ratio 11/02/2017 51.2* 0.0 - 30.0 mg/g Final    Cholesterol, Total 11/02/2017 172  0 - 199 mg/dL Final    Triglycerides 11/02/2017 129  0 - 150 mg/dL Final    HDL 11/02/2017 63* 40 - 60 mg/dL Final    LDL Calculated 11/02/2017 83  <100 mg/dL Final    VLDL Cholesterol Calculated 11/02/2017 26  Not Established mg/dL Final    WBC 11/02/2017 7.6  4.0 - 11.0 K/uL Final    RBC 11/02/2017 4.33  4.00 - 5.20 M/uL Final    Hemoglobin 11/02/2017 10.5* 12.0 - 16.0 g/dL Final    Hematocrit 11/02/2017 33.1* 36.0 - 48.0 % Final    MCV 11/02/2017 76.5* 80.0 - 100.0 fL Final    MCH 11/02/2017 24.3* 26.0 - 34.0 pg Final    MCHC 11/02/2017 31.7  31.0 - 36.0 g/dL Final    RDW 11/02/2017 16.2* 12.4 - 15.4 % Final    Platelets 72/22/9634 340  135 - 450 K/uL Final    MPV 11/02/2017 9.6  5.0 - 10.5 fL Final    Neutrophils % 11/02/2017 66.1  % Final    Lymphocytes % 11/02/2017 21.7  % Final    Monocytes % 11/02/2017 6.1  % Final    Eosinophils % 11/02/2017 4.8  % Final    Basophils % 11/02/2017 1.3  % Final    Neutrophils # 11/02/2017 5.1  1.7 - 7.7 K/uL Final    Lymphocytes # 11/02/2017 1.7  1.0 - 5.1 K/uL Final    Monocytes # 11/02/2017 0.5  0.0 - 1.3 K/uL Final    Eosinophils # 11/02/2017 0.4  0.0 - 0.6 K/uL Final    Basophils # 11/02/2017 0.1  0.0 - 0.2 K/uL Final    Sodium 11/02/2017 139  136 - 145 mmol/L Final    Potassium 11/02/2017 4.5  3.5 - 5.1 mmol/L Final    Chloride 11/02/2017 96* 99 - 110 mmol/L Final    CO2 11/02/2017 22  21 - 32 mmol/L Final    Anion Gap 11/02/2017 21* 3 - 16 Final    Glucose 11/02/2017 169* 70 - 99 mg/dL Final    BUN 11/02/2017 12  7 - 20 mg/dL Final    CREATININE 11/02/2017 0.6  0.6 - 1.1 mg/dL Final    GFR Non- 11/02/2017 >60  >60 Final    GFR  11/02/2017 >60  >60 Final    Calcium 11/02/2017 9.8  8.3 - 10.6 mg/dL Final    Total Protein 11/02/2017 7.7  6.4 - 8.2 g/dL Final    Alb 11/02/2017 4.4  3.4 - 5.0 g/dL Final    Albumin/Globulin Ratio 11/02/2017 1.3  1.1 - 2.2 Final    Total Bilirubin 11/02/2017 0.3  0.0 - 1.0 mg/dL Final    Alkaline Phosphatase 11/02/2017 149* 40 - 129 U/L Final    ALT 11/02/2017 22  10 - 40 U/L Final    AST 11/02/2017 16  15 - 37 U/L Final    Globulin 11/02/2017 3.3  g/dL Final    TSH 11/02/2017 1.08  0.27 - 4.20 uIU/mL Final    Color, UA 11/02/2017 YELLOW  Straw/Yellow Final    Clarity, UA 11/02/2017 Clear  Clear Final    Glucose, Ur 11/02/2017 100* Negative mg/dL Final    Bilirubin Urine 11/02/2017 Negative  Negative Final    Ketones, Urine 11/02/2017 Negative  Negative mg/dL Final    Specific Gravity, UA 11/02/2017 1.005  1.005 - 1.030 Final    Blood, Urine 11/02/2017 Negative  Negative Final    pH, UA 11/02/2017 6.0  5.0 - 8.0 Final    Protein, UA 2: HTN   Controlled     3: Hyperlipidemia   LDL: 83 , HDL: 63 , TGs: 129 Nov 2017    Crestor 10mg daily, tolerating well     4: Hirsutism, since 20's   On face only   Runs in the family   Gonadotropins suggest pre menopause status   Free T high 5.8 (N 0.6-3.8), TT normal 24 July 2018   Add aldactone 50mg daily . Side effects discussed, encouraged to read package insert     5: Morbid obesity   Calorie target <1300 per day   Need to work on diet, exercise and lose weight     RTC in 3 months with A1C, lipids, B12/folate     EDUCATION:   Greater than 50% of this follow-up visit was spent in general counseling regarding   obesity, diet, exercise, importance of adherence to insulin regime, recognition and treatment of hypo and hyperglycemia,  glucose logging, proper diabetes management, diabetic complications with poor management and the importance of glycemic control in order to avoid the complications of diabetes. Risks and potential complications of diabetes were reviewed with the patient. Diabetes health maintenance plan and follow-up were discussed and understood by the patient. We reviewed the importance of medication compliance and regular follow-up. Aggressive lifestyle modification was encouraged. Exercise Counselling: This patient is a candidate for regular physical exercise. Instructions to perform the following types of exercise:  Swimming or water aerobic exercise  Brisk walking  Playing tennis  Stationary bicycle or elliptical indoor  Low impact aerobic exercise    Instructions given to exercise for the following duration:  30 minutes a day for five-seven days per week.     Following instructions for being active throughout the day in addition to formal exercise:  Walk instead of drive whenever possible  Take the stairs instead of the elevator  Work in the garden  Park to the far end of the parking lot to add more walking steps to destination      Electronically signed by John Galloway

## 2018-08-01 ENCOUNTER — APPOINTMENT (OUTPATIENT)
Dept: PHYSICAL THERAPY | Age: 53
End: 2018-08-01
Payer: COMMERCIAL

## 2018-09-17 ENCOUNTER — TELEPHONE (OUTPATIENT)
Dept: PRIMARY CARE CLINIC | Age: 53
End: 2018-09-17

## 2018-09-17 RX ORDER — INSULIN GLARGINE 100 [IU]/ML
INJECTION, SOLUTION SUBCUTANEOUS
Qty: 5 PEN | Refills: 2 | Status: SHIPPED | OUTPATIENT
Start: 2018-09-17 | End: 2019-01-30 | Stop reason: SDUPTHER

## 2018-10-16 DIAGNOSIS — I10 ESSENTIAL HYPERTENSION: ICD-10-CM

## 2018-10-16 DIAGNOSIS — E11.69 TYPE 2 DIABETES MELLITUS WITH OTHER SPECIFIED COMPLICATION, WITH LONG-TERM CURRENT USE OF INSULIN (HCC): ICD-10-CM

## 2018-10-16 DIAGNOSIS — Z79.4 TYPE 2 DIABETES MELLITUS WITH OTHER SPECIFIED COMPLICATION, WITH LONG-TERM CURRENT USE OF INSULIN (HCC): ICD-10-CM

## 2018-10-16 RX ORDER — LOSARTAN POTASSIUM 100 MG/1
100 TABLET ORAL DAILY
Qty: 30 TABLET | Refills: 5 | Status: SHIPPED | OUTPATIENT
Start: 2018-10-16 | End: 2019-07-07 | Stop reason: SDUPTHER

## 2018-10-16 RX ORDER — HYDROCHLOROTHIAZIDE 25 MG/1
25 TABLET ORAL DAILY
Qty: 30 TABLET | Refills: 5 | Status: SHIPPED | OUTPATIENT
Start: 2018-10-16 | End: 2019-05-03 | Stop reason: SDUPTHER

## 2018-10-23 RX ORDER — ERGOCALCIFEROL 1.25 MG/1
CAPSULE ORAL
Qty: 4 CAPSULE | Refills: 1 | Status: SHIPPED | OUTPATIENT
Start: 2018-10-23 | End: 2018-12-16 | Stop reason: SDUPTHER

## 2018-12-13 DIAGNOSIS — M79.2 NEUROPATHIC PAIN: ICD-10-CM

## 2018-12-17 RX ORDER — ERGOCALCIFEROL 1.25 MG/1
CAPSULE ORAL
Qty: 4 CAPSULE | Refills: 1 | Status: SHIPPED | OUTPATIENT
Start: 2018-12-17 | End: 2018-12-31 | Stop reason: SDUPTHER

## 2018-12-26 DIAGNOSIS — Z79.4 TYPE 2 DIABETES MELLITUS WITH DIABETIC NEPHROPATHY, WITH LONG-TERM CURRENT USE OF INSULIN (HCC): ICD-10-CM

## 2018-12-26 DIAGNOSIS — E11.21 TYPE 2 DIABETES MELLITUS WITH DIABETIC NEPHROPATHY, WITH LONG-TERM CURRENT USE OF INSULIN (HCC): ICD-10-CM

## 2018-12-26 DIAGNOSIS — I10 ESSENTIAL HYPERTENSION: ICD-10-CM

## 2018-12-26 DIAGNOSIS — E78.5 DYSLIPIDEMIA ASSOCIATED WITH TYPE 2 DIABETES MELLITUS (HCC): ICD-10-CM

## 2018-12-26 DIAGNOSIS — E11.69 DYSLIPIDEMIA ASSOCIATED WITH TYPE 2 DIABETES MELLITUS (HCC): ICD-10-CM

## 2018-12-26 LAB
A/G RATIO: 1.4 (ref 1.1–2.2)
ALBUMIN SERPL-MCNC: 4.1 G/DL (ref 3.4–5)
ALP BLD-CCNC: 130 U/L (ref 40–129)
ALT SERPL-CCNC: 21 U/L (ref 10–40)
ANION GAP SERPL CALCULATED.3IONS-SCNC: 17 MMOL/L (ref 3–16)
AST SERPL-CCNC: 18 U/L (ref 15–37)
BILIRUB SERPL-MCNC: <0.2 MG/DL (ref 0–1)
BUN BLDV-MCNC: 15 MG/DL (ref 7–20)
CALCIUM SERPL-MCNC: 9.2 MG/DL (ref 8.3–10.6)
CHLORIDE BLD-SCNC: 96 MMOL/L (ref 99–110)
CHOLESTEROL, TOTAL: 117 MG/DL (ref 0–199)
CO2: 22 MMOL/L (ref 21–32)
CREAT SERPL-MCNC: 0.8 MG/DL (ref 0.6–1.1)
FOLATE: 7.76 NG/ML (ref 4.78–24.2)
GFR AFRICAN AMERICAN: >60
GFR NON-AFRICAN AMERICAN: >60
GLOBULIN: 2.9 G/DL
GLUCOSE BLD-MCNC: 214 MG/DL (ref 70–99)
HDLC SERPL-MCNC: 48 MG/DL (ref 40–60)
LDL CHOLESTEROL CALCULATED: 46 MG/DL
POTASSIUM SERPL-SCNC: 4.5 MMOL/L (ref 3.5–5.1)
SODIUM BLD-SCNC: 135 MMOL/L (ref 136–145)
TOTAL PROTEIN: 7 G/DL (ref 6.4–8.2)
TRIGL SERPL-MCNC: 113 MG/DL (ref 0–150)
VITAMIN B-12: 531 PG/ML (ref 211–911)
VLDLC SERPL CALC-MCNC: 23 MG/DL

## 2018-12-27 ENCOUNTER — TELEPHONE (OUTPATIENT)
Dept: ORTHOPEDIC SURGERY | Age: 53
End: 2018-12-27

## 2018-12-27 LAB
ESTIMATED AVERAGE GLUCOSE: 208.7 MG/DL
HBA1C MFR BLD: 8.9 %

## 2018-12-28 RX ORDER — CELECOXIB 200 MG/1
CAPSULE ORAL
Qty: 30 CAPSULE | Refills: 2 | Status: SHIPPED | OUTPATIENT
Start: 2018-12-28 | End: 2022-10-21

## 2018-12-31 ENCOUNTER — OFFICE VISIT (OUTPATIENT)
Dept: ENDOCRINOLOGY | Age: 53
End: 2018-12-31
Payer: COMMERCIAL

## 2018-12-31 VITALS
OXYGEN SATURATION: 98 % | WEIGHT: 271 LBS | DIASTOLIC BLOOD PRESSURE: 75 MMHG | BODY MASS INDEX: 42.53 KG/M2 | HEIGHT: 67 IN | SYSTOLIC BLOOD PRESSURE: 133 MMHG | HEART RATE: 99 BPM

## 2018-12-31 DIAGNOSIS — Z79.4 TYPE 2 DIABETES MELLITUS WITH DIABETIC NEPHROPATHY, WITH LONG-TERM CURRENT USE OF INSULIN (HCC): Primary | ICD-10-CM

## 2018-12-31 DIAGNOSIS — E78.5 DYSLIPIDEMIA ASSOCIATED WITH TYPE 2 DIABETES MELLITUS (HCC): ICD-10-CM

## 2018-12-31 DIAGNOSIS — I10 ESSENTIAL HYPERTENSION: ICD-10-CM

## 2018-12-31 DIAGNOSIS — E11.21 TYPE 2 DIABETES MELLITUS WITH DIABETIC NEPHROPATHY, WITH LONG-TERM CURRENT USE OF INSULIN (HCC): Primary | ICD-10-CM

## 2018-12-31 DIAGNOSIS — E11.69 DYSLIPIDEMIA ASSOCIATED WITH TYPE 2 DIABETES MELLITUS (HCC): ICD-10-CM

## 2018-12-31 DIAGNOSIS — E66.01 MORBID OBESITY WITH BMI OF 40.0-44.9, ADULT (HCC): ICD-10-CM

## 2018-12-31 PROCEDURE — 2022F DILAT RTA XM EVC RTNOPTHY: CPT | Performed by: INTERNAL MEDICINE

## 2018-12-31 PROCEDURE — 1036F TOBACCO NON-USER: CPT | Performed by: INTERNAL MEDICINE

## 2018-12-31 PROCEDURE — 3045F PR MOST RECENT HEMOGLOBIN A1C LEVEL 7.0-9.0%: CPT | Performed by: INTERNAL MEDICINE

## 2018-12-31 PROCEDURE — G8484 FLU IMMUNIZE NO ADMIN: HCPCS | Performed by: INTERNAL MEDICINE

## 2018-12-31 PROCEDURE — 99214 OFFICE O/P EST MOD 30 MIN: CPT | Performed by: INTERNAL MEDICINE

## 2018-12-31 PROCEDURE — 3017F COLORECTAL CA SCREEN DOC REV: CPT | Performed by: INTERNAL MEDICINE

## 2018-12-31 PROCEDURE — G8417 CALC BMI ABV UP PARAM F/U: HCPCS | Performed by: INTERNAL MEDICINE

## 2018-12-31 PROCEDURE — G8427 DOCREV CUR MEDS BY ELIG CLIN: HCPCS | Performed by: INTERNAL MEDICINE

## 2018-12-31 NOTE — PROGRESS NOTES
Patient ID:   Rhea Patiño is a 47 y.o. female    Chief Complaint:   Rhea Patiño presents for an evaluation of Type 2 Diabetes Mellitus , Hyperlipidemia and hypertension. Subjective:   Type 2 Diabetes Mellitus diagnosed at age 30-33. On insulin since Nov 2017. Invokana caused UTI in past.     Metformin ER 500mg, two tabs twice a day    Trulicity 4.0FS weekly  (Sunday)  Lantus 34 units daily in am. Denies missing the dose. Checks blood sugars 2 times per day. Reportedly. AM: 113-200   Lunch: 100-120  Supper:   HS:  140-150    Hypoglycemias: none     Meals: Three, dinner is late 7-9 pm as she prepares for him. Occasional snacks (chips, pretzels). Diet pepsi. Exercise: None     Denies chest pain, exertional dyspnea. Family history of CAD: in multiple family members   Denies smoking/alcohol.    Currently on ASA 81 mg daily     The following portions of the patient's history were reviewed and updated as appropriate:       Family History   Problem Relation Age of Onset    Diabetes Mother     High Blood Pressure Mother     High Cholesterol Mother     High Blood Pressure Father     Heart Disease Sister     Diabetes Maternal Aunt     Stroke Maternal Aunt     Heart Disease Maternal Uncle     Cancer Maternal Uncle     Cancer Maternal Grandmother     Diabetes Maternal Grandfather     Diabetes Maternal Aunt     Stroke Maternal Aunt     Diabetes Maternal Aunt     Stroke Maternal Aunt     Heart Disease Maternal Aunt     No Known Problems Brother     No Known Problems Paternal Aunt     No Known Problems Paternal Uncle     No Known Problems Paternal Grandmother     No Known Problems Paternal Grandfather     No Known Problems Other     Rheum Arthritis Neg Hx     Osteoarthritis Neg Hx     Asthma Neg Hx     Breast Cancer Neg Hx     Heart Failure Neg Hx     Hypertension Neg Hx     Migraines Neg Hx     Ovarian Cancer Neg Hx     Rashes/Skin Problems Neg Hx     Seizures Neg spironolactone (ALDACTONE) 50 MG tablet, Take 1 tablet by mouth daily, Disp: 90 tablet, Rfl: 1    omeprazole (PRILOSEC) 40 MG delayed release capsule, TAKE 1 CAPSULE BY MOUTH DAILY, Disp: 30 capsule, Rfl: 5    aspirin 81 MG EC tablet, TAKE 1 TABLET BY MOUTH DAILY, Disp: 90 tablet, Rfl: 2    Cholecalciferol (VITAMIN D3) 63436 units CAPS, once a week , Disp: , Rfl:     B-D ULTRAFINE III SHORT PEN 31G X 8 MM MISC, USE AS DIRECTED TO INJECT INSULIN EVERY DAY, Disp: , Rfl: 3    docusate sodium (COLACE) 100 MG capsule, Take 1 capsule by mouth 2 times daily as needed for Constipation, Disp: 50 capsule, Rfl: 1      Review of Systems:    Constitutional: Negative for fever, chills, and unexpected weight change. HENT: Negative for congestion, ear pain, rhinorrhea,  sore throat and trouble swallowing. Eyes: Negative for photophobia, redness, itching. Respiratory: Negative for cough, shortness of breath and sputum. Cardiovascular: Negative for chest pain, palpitations and leg swelling. Gastrointestinal: Negative for nausea, vomiting, abdominal pain, diarrhea, constipation. Endocrine: Negative for cold intolerance, heat intolerance, polydipsia, polyphagia and polyuria. Genitourinary: Negative for dysuria, urgency, frequency, hematuria and flank pain. Musculoskeletal: Negative for myalgias, back pain, arthralgias and neck pain. Skin/Nail: Negative for rash, itching. Normal nails. Neurological: Negative for seizures, weakness, light-headedness, numbness and headaches. Hematological/ Lymph nodes: Negative for adenopathy. Does not bruise/bleed easily. Psychiatric/Behavioral: Negative for suicidal ideas, depression, anxiety, sleep disturbance and decreased concentration. Objective:   Physical Exam:  /75 (Site: Left Upper Arm, Position: Sitting, Cuff Size: Large Adult)   Pulse 99   Ht 5' 7\" (1.702 m)   Wt 271 lb (122.9 kg)   LMP 11/30/2018   SpO2 98%   Breastfeeding?  No   BMI 42.44

## 2019-01-08 ENCOUNTER — TELEPHONE (OUTPATIENT)
Dept: ENDOCRINOLOGY | Age: 54
End: 2019-01-08

## 2019-01-10 DIAGNOSIS — K29.50 CHRONIC GASTRITIS WITHOUT BLEEDING, UNSPECIFIED GASTRITIS TYPE: ICD-10-CM

## 2019-01-10 RX ORDER — OMEPRAZOLE 40 MG/1
CAPSULE, DELAYED RELEASE ORAL
Qty: 30 CAPSULE | Refills: 5 | Status: SHIPPED | OUTPATIENT
Start: 2019-01-10 | End: 2019-05-08 | Stop reason: SDUPTHER

## 2019-01-31 RX ORDER — INSULIN GLARGINE 100 [IU]/ML
34 INJECTION, SOLUTION SUBCUTANEOUS
Qty: 15 PEN | Refills: 2 | Status: SHIPPED | OUTPATIENT
Start: 2019-01-31 | End: 2019-05-08 | Stop reason: SDUPTHER

## 2019-02-13 RX ORDER — ERGOCALCIFEROL 1.25 MG/1
CAPSULE ORAL
Qty: 4 CAPSULE | Refills: 1 | Status: SHIPPED | OUTPATIENT
Start: 2019-02-13 | End: 2019-04-08 | Stop reason: SDUPTHER

## 2019-02-27 DIAGNOSIS — L68.0 HIRSUTISM: ICD-10-CM

## 2019-02-28 RX ORDER — SPIRONOLACTONE 50 MG/1
TABLET, FILM COATED ORAL
Qty: 90 TABLET | Refills: 1 | Status: SHIPPED | OUTPATIENT
Start: 2019-02-28 | End: 2019-10-13 | Stop reason: SDUPTHER

## 2019-03-11 ENCOUNTER — OFFICE VISIT (OUTPATIENT)
Dept: ORTHOPEDIC SURGERY | Age: 54
End: 2019-03-11
Payer: COMMERCIAL

## 2019-03-11 VITALS
SYSTOLIC BLOOD PRESSURE: 122 MMHG | WEIGHT: 270.95 LBS | HEART RATE: 93 BPM | HEIGHT: 67 IN | BODY MASS INDEX: 42.53 KG/M2 | DIASTOLIC BLOOD PRESSURE: 77 MMHG

## 2019-03-11 DIAGNOSIS — M75.102 ROTATOR CUFF SYNDROME OF LEFT SHOULDER: Primary | ICD-10-CM

## 2019-03-11 PROCEDURE — 99213 OFFICE O/P EST LOW 20 MIN: CPT | Performed by: ORTHOPAEDIC SURGERY

## 2019-04-08 RX ORDER — ERGOCALCIFEROL 1.25 MG/1
CAPSULE ORAL
Qty: 4 CAPSULE | Refills: 1 | Status: SHIPPED | OUTPATIENT
Start: 2019-04-08 | End: 2019-09-16

## 2019-04-21 DIAGNOSIS — K59.00 CONSTIPATION, UNSPECIFIED CONSTIPATION TYPE: ICD-10-CM

## 2019-04-22 RX ORDER — DOCUSATE SODIUM 100 MG/1
100 CAPSULE, LIQUID FILLED ORAL 2 TIMES DAILY PRN
Qty: 50 CAPSULE | Refills: 1 | Status: SHIPPED | OUTPATIENT
Start: 2019-04-22 | End: 2021-09-10 | Stop reason: SDUPTHER

## 2019-05-03 DIAGNOSIS — I10 ESSENTIAL HYPERTENSION: ICD-10-CM

## 2019-05-03 DIAGNOSIS — E11.69 TYPE 2 DIABETES MELLITUS WITH OTHER SPECIFIED COMPLICATION (HCC): ICD-10-CM

## 2019-05-03 RX ORDER — ASPIRIN 81 MG/1
TABLET ORAL
Qty: 90 TABLET | Refills: 2 | Status: SHIPPED | OUTPATIENT
Start: 2019-05-03 | End: 2020-07-10

## 2019-05-03 RX ORDER — HYDROCHLOROTHIAZIDE 25 MG/1
TABLET ORAL
Qty: 30 TABLET | Refills: 5 | Status: SHIPPED | OUTPATIENT
Start: 2019-05-03 | End: 2020-01-20 | Stop reason: SDUPTHER

## 2019-05-04 DIAGNOSIS — Z79.4 TYPE 2 DIABETES MELLITUS WITH DIABETIC NEPHROPATHY, WITH LONG-TERM CURRENT USE OF INSULIN (HCC): ICD-10-CM

## 2019-05-04 DIAGNOSIS — E11.21 TYPE 2 DIABETES MELLITUS WITH DIABETIC NEPHROPATHY, WITH LONG-TERM CURRENT USE OF INSULIN (HCC): ICD-10-CM

## 2019-05-06 ENCOUNTER — OFFICE VISIT (OUTPATIENT)
Dept: ENDOCRINOLOGY | Age: 54
End: 2019-05-06
Payer: COMMERCIAL

## 2019-05-06 VITALS
WEIGHT: 265 LBS | OXYGEN SATURATION: 99 % | HEART RATE: 102 BPM | BODY MASS INDEX: 41.59 KG/M2 | SYSTOLIC BLOOD PRESSURE: 126 MMHG | HEIGHT: 67 IN | DIASTOLIC BLOOD PRESSURE: 79 MMHG

## 2019-05-06 DIAGNOSIS — E78.5 DYSLIPIDEMIA ASSOCIATED WITH TYPE 2 DIABETES MELLITUS (HCC): ICD-10-CM

## 2019-05-06 DIAGNOSIS — I10 ESSENTIAL HYPERTENSION: ICD-10-CM

## 2019-05-06 DIAGNOSIS — L68.0 HIRSUTISM: ICD-10-CM

## 2019-05-06 DIAGNOSIS — E66.01 MORBID OBESITY WITH BMI OF 40.0-44.9, ADULT (HCC): ICD-10-CM

## 2019-05-06 DIAGNOSIS — E11.21 TYPE 2 DIABETES MELLITUS WITH DIABETIC NEPHROPATHY, WITH LONG-TERM CURRENT USE OF INSULIN (HCC): Primary | ICD-10-CM

## 2019-05-06 DIAGNOSIS — Z79.4 TYPE 2 DIABETES MELLITUS WITH DIABETIC NEPHROPATHY, WITH LONG-TERM CURRENT USE OF INSULIN (HCC): Primary | ICD-10-CM

## 2019-05-06 DIAGNOSIS — E11.69 DYSLIPIDEMIA ASSOCIATED WITH TYPE 2 DIABETES MELLITUS (HCC): ICD-10-CM

## 2019-05-06 PROCEDURE — 3046F HEMOGLOBIN A1C LEVEL >9.0%: CPT | Performed by: INTERNAL MEDICINE

## 2019-05-06 PROCEDURE — G8417 CALC BMI ABV UP PARAM F/U: HCPCS | Performed by: INTERNAL MEDICINE

## 2019-05-06 PROCEDURE — 99214 OFFICE O/P EST MOD 30 MIN: CPT | Performed by: INTERNAL MEDICINE

## 2019-05-06 PROCEDURE — 3017F COLORECTAL CA SCREEN DOC REV: CPT | Performed by: INTERNAL MEDICINE

## 2019-05-06 PROCEDURE — 1036F TOBACCO NON-USER: CPT | Performed by: INTERNAL MEDICINE

## 2019-05-06 PROCEDURE — 2022F DILAT RTA XM EVC RTNOPTHY: CPT | Performed by: INTERNAL MEDICINE

## 2019-05-06 PROCEDURE — G8427 DOCREV CUR MEDS BY ELIG CLIN: HCPCS | Performed by: INTERNAL MEDICINE

## 2019-05-06 RX ORDER — METFORMIN HYDROCHLORIDE 500 MG/1
TABLET, EXTENDED RELEASE ORAL
Qty: 124 TABLET | Refills: 5 | Status: SHIPPED | OUTPATIENT
Start: 2019-05-06 | End: 2019-11-27 | Stop reason: SDUPTHER

## 2019-05-06 NOTE — PROGRESS NOTES
Patient ID:   Tamra Gaucher is a 49 y.o. female    Chief Complaint:   Tamra Gaucher presents for an evaluation of Type 2 Diabetes Mellitus , Hyperlipidemia and hypertension. Subjective:   Type 2 Diabetes Mellitus diagnosed at age 30-32. On insulin since Nov 2017. Invokana caused UTI in past.     Metformin ER 500mg, two tabs twice a day    Trulicity 0.4OC weekly  (Monday)  Basaglar 34 units daily in am. Denies missing the dose. Checks blood sugars 2 times per day. Reportedly 120-250    AM:     Lunch:   Supper:   HS:       Hypoglycemias: none     Meals: Three, dinner is late 7-9 pm as she prepares for him. Occasional snacks (chips, pretzels). Diet pepsi. Exercise: None     Denies chest pain, exertional dyspnea. Family history of CAD: in multiple family members   Denies smoking/alcohol.    Currently on ASA 81 mg daily     The following portions of the patient's history were reviewed and updated as appropriate:       Family History   Problem Relation Age of Onset    Diabetes Mother     High Blood Pressure Mother     High Cholesterol Mother     High Blood Pressure Father     Heart Disease Sister     Diabetes Maternal Aunt     Stroke Maternal Aunt     Heart Disease Maternal Uncle     Cancer Maternal Uncle     Cancer Maternal Grandmother     Diabetes Maternal Grandfather     Diabetes Maternal Aunt     Stroke Maternal Aunt     Diabetes Maternal Aunt     Stroke Maternal Aunt     Heart Disease Maternal Aunt     No Known Problems Brother     No Known Problems Paternal Aunt     No Known Problems Paternal Uncle     No Known Problems Paternal Grandmother     No Known Problems Paternal Grandfather     No Known Problems Other     Rheum Arthritis Neg Hx     Osteoarthritis Neg Hx     Asthma Neg Hx     Breast Cancer Neg Hx     Heart Failure Neg Hx     Hypertension Neg Hx     Migraines Neg Hx     Ovarian Cancer Neg Hx     Rashes/Skin Problems Neg Hx     Seizures Neg Hx     Thyroid Disease Neg Hx          Social History     Socioeconomic History    Marital status: Single     Spouse name: Not on file    Number of children: Not on file    Years of education: Not on file    Highest education level: Not on file   Occupational History    Not on file   Social Needs    Financial resource strain: Not on file    Food insecurity:     Worry: Not on file     Inability: Not on file    Transportation needs:     Medical: Not on file     Non-medical: Not on file   Tobacco Use    Smoking status: Former Smoker     Packs/day: 0.25     Years: 10.00     Pack years: 2.50    Smokeless tobacco: Former User     Quit date: 6/6/2014    Tobacco comment: quit 2 years ago   Substance and Sexual Activity    Alcohol use:  Yes     Alcohol/week: 0.0 oz     Comment: socially    Drug use: No    Sexual activity: Not Currently     Partners: Male   Lifestyle    Physical activity:     Days per week: Not on file     Minutes per session: Not on file    Stress: Not on file   Relationships    Social connections:     Talks on phone: Not on file     Gets together: Not on file     Attends Hindu service: Not on file     Active member of club or organization: Not on file     Attends meetings of clubs or organizations: Not on file     Relationship status: Not on file    Intimate partner violence:     Fear of current or ex partner: Not on file     Emotionally abused: Not on file     Physically abused: Not on file     Forced sexual activity: Not on file   Other Topics Concern    Not on file   Social History Narrative    Not on file       Past Medical History:   Diagnosis Date    Arthritis of left acromioclavicular joint 6/19/2017    Essential hypertension 4/16/2018    Fibroids     uterine    Type II or unspecified type diabetes mellitus without mention of complication, not stated as uncontrolled        Past Surgical History:   Procedure Laterality Date    COLONOSCOPY  5/23/16    incomplete, Dr Fany Dubois WISDOM TOOTH EXTRACTION         No Known Allergies      Current Outpatient Medications:     Lorcaserin HCl (BELVIQ) 10 MG TABS, 10mg bid, Disp: 180 tablet, Rfl: 0    aspirin 81 MG EC tablet, TAKE 1 TABLET BY MOUTH EVERY DAY, Disp: 90 tablet, Rfl: 2    hydrochlorothiazide (HYDRODIURIL) 25 MG tablet, TAKE 1 TABLET BY MOUTH EVERY DAY, Disp: 30 tablet, Rfl: 5    docusate sodium (COLACE) 100 MG capsule, TAKE 1 CAPSULE BY MOUTH 2 TIMES DAILY AS NEEDED FOR CONSTIPATION, Disp: 50 capsule, Rfl: 1    vitamin D (ERGOCALCIFEROL) 36311 units CAPS capsule, TAKE ONE CAPSULE BY MOUTH ONE TIME PER WEEK, Disp: 4 capsule, Rfl: 1    spironolactone (ALDACTONE) 50 MG tablet, TAKE 1 TABLET BY MOUTH EVERY DAY, Disp: 90 tablet, Rfl: 1    rosuvastatin (CRESTOR) 10 MG tablet, TAKE 1 TABLET BY MOUTH EVERY DAY IN THE EVENING, Disp: 90 tablet, Rfl: 2    BASAGLAR KWIKPEN 100 UNIT/ML injection pen, Inject 34 Units into the skin every morning (before breakfast), Disp: 15 pen, Rfl: 2    TRULICITY 1.5 FB/8.5JA SOPN, INJECT 1.5 MG INTO THE SKIN ONCE A WEEK, Disp: 4 pen, Rfl: 3    omeprazole (PRILOSEC) 40 MG delayed release capsule, TAKE 1 CAPSULE BY MOUTH EVERY DAY (Patient taking differently: TAKE 1 CAPSULE BY MOUTH EVERY DAY AS NEEDED), Disp: 30 capsule, Rfl: 5    celecoxib (CELEBREX) 200 MG capsule, TAKE 1 CAPSULE BY MOUTH EVERY DAY (Patient taking differently: TAKE 1 CAPSULE BY MOUTH AS NEEDED), Disp: 30 capsule, Rfl: 2    metFORMIN (GLUCOPHAGE-XR) 500 MG extended release tablet, TAKE 2 TABLETS TWICE A DAY WITH MEALS, Disp: 124 tablet, Rfl: 5    losartan (COZAAR) 100 MG tablet, TAKE 1 TABLET BY MOUTH DAILY, Disp: 30 tablet, Rfl: 5    Cholecalciferol (VITAMIN D3) 83011 units CAPS, once a week , Disp: , Rfl:     B-D ULTRAFINE III SHORT PEN 31G X 8 MM MISC, USE AS DIRECTED TO INJECT INSULIN EVERY DAY, Disp: , Rfl: 3    LYRICA 25 MG capsule, TAKE ONE CAPSULE BY MOUTH TWICE A DAY, Disp: 60 capsule, Rfl: 1      Review of Systems:    Constitutional: Negative for fever, chills, and unexpected weight change. HENT: Negative for congestion, ear pain, rhinorrhea,  sore throat and trouble swallowing. Eyes: Negative for photophobia, redness, itching. Respiratory: Negative for cough, shortness of breath and sputum. Cardiovascular: Negative for chest pain, palpitations and leg swelling. Gastrointestinal: Negative for nausea, vomiting, abdominal pain, diarrhea, constipation. Endocrine: Negative for cold intolerance, heat intolerance, polydipsia, polyphagia and polyuria. Genitourinary: Negative for dysuria, urgency, frequency, hematuria and flank pain. Musculoskeletal: Negative for myalgias, back pain, arthralgias and neck pain. Skin/Nail: Negative for rash, itching. Normal nails. Neurological: Negative for seizures, weakness, light-headedness, numbness and headaches. Hematological/ Lymph nodes: Negative for adenopathy. Does not bruise/bleed easily. Psychiatric/Behavioral: Negative for suicidal ideas, depression, anxiety, sleep disturbance and decreased concentration. Objective:   Physical Exam:  /79 (Site: Right Upper Arm, Position: Sitting, Cuff Size: Large Adult)   Pulse 102   Ht 5' 7\" (1.702 m)   Wt 265 lb (120.2 kg)   LMP 04/29/2019   SpO2 99%   Breastfeeding? No   BMI 41.50 kg/m²   Constitutional: Patient is oriented to person, place, and time. Patient appears well-developed and well-nourished. HENT:    Head: Normocephalic and atraumatic. Eyes: Conjunctivae and EOM are normal. Pupils are equal, round, and reactive to light. Neck: Normal range of motion. Cardiovascular: Normal rate, regular rhythm and normal heart sounds. Pulmonary/Chest: Effort normal and breath sounds normal.   Abdominal: Soft. Bowel sounds are normal.   Musculoskeletal: Normal range of motion. Neurological: Patient is alert and oriented to person, place, and time. Patient has normal reflexes.    Skin: Skin is warm and dry. Psychiatric: Patient has a normal mood and affect.  Patient behavior is normal.     Lab Review:    Orders Only on 12/26/2018   Component Date Value Ref Range Status    Hemoglobin A1C 12/26/2018 8.9  See comment % Final    eAG 12/26/2018 208.7  mg/dL Final    Cholesterol, Total 12/26/2018 117  0 - 199 mg/dL Final    Triglycerides 12/26/2018 113  0 - 150 mg/dL Final    HDL 12/26/2018 48  40 - 60 mg/dL Final    LDL Calculated 12/26/2018 46  <100 mg/dL Final    VLDL Cholesterol Calculated 12/26/2018 23  Not Established mg/dL Final    Sodium 12/26/2018 135* 136 - 145 mmol/L Final    Potassium 12/26/2018 4.5  3.5 - 5.1 mmol/L Final    Chloride 12/26/2018 96* 99 - 110 mmol/L Final    CO2 12/26/2018 22  21 - 32 mmol/L Final    Anion Gap 12/26/2018 17* 3 - 16 Final    Glucose 12/26/2018 214* 70 - 99 mg/dL Final    BUN 12/26/2018 15  7 - 20 mg/dL Final    CREATININE 12/26/2018 0.8  0.6 - 1.1 mg/dL Final    GFR Non- 12/26/2018 >60  >60 Final    GFR  12/26/2018 >60  >60 Final    Calcium 12/26/2018 9.2  8.3 - 10.6 mg/dL Final    Total Protein 12/26/2018 7.0  6.4 - 8.2 g/dL Final    Alb 12/26/2018 4.1  3.4 - 5.0 g/dL Final    Albumin/Globulin Ratio 12/26/2018 1.4  1.1 - 2.2 Final    Total Bilirubin 12/26/2018 <0.2  0.0 - 1.0 mg/dL Final    Alkaline Phosphatase 12/26/2018 130* 40 - 129 U/L Final    ALT 12/26/2018 21  10 - 40 U/L Final    AST 12/26/2018 18  15 - 37 U/L Final    Globulin 12/26/2018 2.9  g/dL Final    Vitamin B-12 12/26/2018 531  211 - 911 pg/mL Final    Folate 12/26/2018 7.76  4.78 - 24.20 ng/mL Final   Orders Only on 07/26/2018   Component Date Value Ref Range Status    Hemoglobin A1C 07/26/2018 8.7  See comment % Final    eAG 07/26/2018 203.0  mg/dL Final    Microalbumin, Random Urine 07/26/2018 <1.20  <2.0 mg/dL Final    Creatinine, Ur 07/26/2018 157.8  28.0 - 259.0 mg/dL Final    Microalbumin Creatinine Ratio 07/26/2018 see below 0.0 - 30.0 mg/g Final    DHEAS (DHEA Sulfate) 07/26/2018 74.7  26 - 200 ug/dL Final    17-OH PROGESTERONE LCMS 07/26/2018 60.66  <=206.00 ng/dL Final    Testosterone 07/26/2018 24  20 - 70 ng/dL Final    Sex Hormone Binding 07/26/2018 18* 30 - 135 nmol/L Final    Testosterone, Free 07/26/2018 5.8* 0.6 - 3.8 pg/mL Final    DHEA (Dehydroepiandrosterone) 07/26/2018 0.945  0.630 - 4.700 ng/mL Final    Prolactin 07/26/2018 13.9  ng/mL Final    Androstenedione 07/26/2018 0.546  0.130 - 0.820 ng/mL Final    LH 07/26/2018 14.9  mIU/mL Final    FSH 07/26/2018 11.5  mIU/mL Final    Sodium 07/26/2018 138  136 - 145 mmol/L Final    Potassium 07/26/2018 4.6  3.5 - 5.1 mmol/L Final    Chloride 07/26/2018 101  99 - 110 mmol/L Final    CO2 07/26/2018 22  21 - 32 mmol/L Final    Anion Gap 07/26/2018 15  3 - 16 Final    Glucose 07/26/2018 249* 70 - 99 mg/dL Final    BUN 07/26/2018 19  7 - 20 mg/dL Final    CREATININE 07/26/2018 0.8  0.6 - 1.1 mg/dL Final    GFR Non- 07/26/2018 >60  >60 Final    GFR  07/26/2018 >60  >60 Final    Calcium 07/26/2018 9.6  8.3 - 10.6 mg/dL Final    Total Protein 07/26/2018 7.1  6.4 - 8.2 g/dL Final    Alb 07/26/2018 4.1  3.4 - 5.0 g/dL Final    Albumin/Globulin Ratio 07/26/2018 1.4  1.1 - 2.2 Final    Total Bilirubin 07/26/2018 <0.2  0.0 - 1.0 mg/dL Final    Alkaline Phosphatase 07/26/2018 125  40 - 129 U/L Final    ALT 07/26/2018 16  10 - 40 U/L Final    AST 07/26/2018 12* 15 - 37 U/L Final    Globulin 07/26/2018 3.0  g/dL Final           Assessment and Plan     Tosin was seen today for diabetes.     Diagnoses and all orders for this visit:    Type 2 diabetes mellitus with diabetic nephropathy, with long-term current use of insulin ([facility])  -     Hemoglobin A1C; Future  -      DIABETES FOOT EXAM  -     Lorcaserin HCl (BELVIQ) 10 MG TABS; 10mg bid    Dyslipidemia associated with type 2 diabetes mellitus ([facility] [address]    Morbid obesity with BMI of with poor management and the importance of glycemic control in order to avoid the complications of diabetes. Risks and potential complications of diabetes were reviewed with the patient. Diabetes health maintenance plan and follow-up were discussed and understood by the patient. We reviewed the importance of medication compliance and regular follow-up. Aggressive lifestyle modification was encouraged. Exercise Counselling: This patient is a candidate for regular physical exercise. Instructions to perform the following types of exercise:  Swimming or water aerobic exercise  Brisk walking  Playing tennis  Stationary bicycle or elliptical indoor  Low impact aerobic exercise    Instructions given to exercise for the following duration:  30 minutes a day for five-seven days per week.     Following instructions for being active throughout the day in addition to formal exercise:  Walk instead of drive whenever possible  Take the stairs instead of the elevator  Work in the garden  Park to the far end of the parking lot to add more walking steps to destination      Electronically signed by Michael Suero MD on 5/6/2019 at 5:29 PM

## 2019-05-06 NOTE — PATIENT INSTRUCTIONS

## 2019-05-08 DIAGNOSIS — K29.50 CHRONIC GASTRITIS WITHOUT BLEEDING, UNSPECIFIED GASTRITIS TYPE: ICD-10-CM

## 2019-05-08 DIAGNOSIS — M79.2 NEUROPATHIC PAIN: ICD-10-CM

## 2019-05-08 RX ORDER — PREGABALIN 25 MG/1
25 CAPSULE ORAL 2 TIMES DAILY
Qty: 60 CAPSULE | Refills: 1 | Status: SHIPPED | OUTPATIENT
Start: 2019-05-08 | End: 2021-05-14

## 2019-05-08 RX ORDER — INSULIN GLARGINE 100 [IU]/ML
34 INJECTION, SOLUTION SUBCUTANEOUS
Qty: 15 PEN | Refills: 2 | Status: SHIPPED | OUTPATIENT
Start: 2019-05-08 | End: 2020-02-18 | Stop reason: SDUPTHER

## 2019-05-08 RX ORDER — OMEPRAZOLE 40 MG/1
40 CAPSULE, DELAYED RELEASE ORAL DAILY
Qty: 30 CAPSULE | Refills: 5 | Status: SHIPPED | OUTPATIENT
Start: 2019-05-08 | End: 2019-09-08

## 2019-06-03 RX ORDER — ERGOCALCIFEROL 1.25 MG/1
CAPSULE ORAL
Qty: 4 CAPSULE | Refills: 1 | OUTPATIENT
Start: 2019-06-03

## 2019-07-07 DIAGNOSIS — Z79.4 TYPE 2 DIABETES MELLITUS WITH OTHER SPECIFIED COMPLICATION, WITH LONG-TERM CURRENT USE OF INSULIN (HCC): ICD-10-CM

## 2019-07-07 DIAGNOSIS — E11.69 TYPE 2 DIABETES MELLITUS WITH OTHER SPECIFIED COMPLICATION, WITH LONG-TERM CURRENT USE OF INSULIN (HCC): ICD-10-CM

## 2019-07-07 DIAGNOSIS — I10 ESSENTIAL HYPERTENSION: ICD-10-CM

## 2019-07-08 RX ORDER — LOSARTAN POTASSIUM 100 MG/1
TABLET ORAL
Qty: 30 TABLET | Refills: 5 | Status: SHIPPED | OUTPATIENT
Start: 2019-07-08 | End: 2020-01-20 | Stop reason: SDUPTHER

## 2019-09-08 ENCOUNTER — HOSPITAL ENCOUNTER (EMERGENCY)
Age: 54
Discharge: HOME | End: 2019-09-08
Attending: EMERGENCY MEDICINE
Payer: COMMERCIAL

## 2019-09-08 VITALS
WEIGHT: 253.3 LBS | OXYGEN SATURATION: 100 % | TEMPERATURE: 98.3 F | BODY MASS INDEX: 39.75 KG/M2 | SYSTOLIC BLOOD PRESSURE: 140 MMHG | HEIGHT: 67 IN | RESPIRATION RATE: 17 BRPM | HEART RATE: 97 BPM | DIASTOLIC BLOOD PRESSURE: 68 MMHG

## 2019-09-08 DIAGNOSIS — K21.9 GASTROESOPHAGEAL REFLUX DISEASE, ESOPHAGITIS PRESENCE NOT SPECIFIED: ICD-10-CM

## 2019-09-08 DIAGNOSIS — D64.9 CHRONIC ANEMIA: ICD-10-CM

## 2019-09-08 DIAGNOSIS — R10.13 ABDOMINAL PAIN, EPIGASTRIC: Primary | ICD-10-CM

## 2019-09-08 DIAGNOSIS — R11.0 NAUSEA: ICD-10-CM

## 2019-09-08 LAB
A/G RATIO: 1.1 (ref 1.1–2.2)
ALBUMIN SERPL-MCNC: 4.2 G/DL (ref 3.4–5)
ALP BLD-CCNC: 129 U/L (ref 40–129)
ALT SERPL-CCNC: 13 U/L (ref 10–40)
AMORPHOUS: ABNORMAL /HPF
ANION GAP SERPL CALCULATED.3IONS-SCNC: 16 MMOL/L (ref 3–16)
AST SERPL-CCNC: 15 U/L (ref 15–37)
BASOPHILS ABSOLUTE: 0.1 K/UL (ref 0–0.2)
BASOPHILS RELATIVE PERCENT: 1.2 %
BILIRUB SERPL-MCNC: 0.3 MG/DL (ref 0–1)
BILIRUBIN URINE: NEGATIVE
BLOOD, URINE: ABNORMAL
BUN BLDV-MCNC: 16 MG/DL (ref 7–20)
CALCIUM SERPL-MCNC: 10.2 MG/DL (ref 8.3–10.6)
CHLORIDE BLD-SCNC: 95 MMOL/L (ref 99–110)
CLARITY: ABNORMAL
CO2: 24 MMOL/L (ref 21–32)
COLOR: YELLOW
CREAT SERPL-MCNC: 1 MG/DL (ref 0.6–1.1)
EOSINOPHILS ABSOLUTE: 0.2 K/UL (ref 0–0.6)
EOSINOPHILS RELATIVE PERCENT: 3.1 %
EPITHELIAL CELLS, UA: ABNORMAL /HPF
GFR AFRICAN AMERICAN: >60
GFR NON-AFRICAN AMERICAN: 58
GLOBULIN: 3.8 G/DL
GLUCOSE BLD-MCNC: 218 MG/DL (ref 70–99)
GLUCOSE URINE: NEGATIVE MG/DL
HCG(URINE) PREGNANCY TEST: NEGATIVE
HCT VFR BLD CALC: 28.8 % (ref 36–48)
HEMOGLOBIN: 9.2 G/DL (ref 12–16)
KETONES, URINE: NEGATIVE MG/DL
LEUKOCYTE ESTERASE, URINE: ABNORMAL
LIPASE: 36 U/L (ref 13–60)
LYMPHOCYTES ABSOLUTE: 1.2 K/UL (ref 1–5.1)
LYMPHOCYTES RELATIVE PERCENT: 15.8 %
MCH RBC QN AUTO: 24.5 PG (ref 26–34)
MCHC RBC AUTO-ENTMCNC: 31.9 G/DL (ref 31–36)
MCV RBC AUTO: 76.8 FL (ref 80–100)
MICROSCOPIC EXAMINATION: YES
MONOCYTES ABSOLUTE: 0.5 K/UL (ref 0–1.3)
MONOCYTES RELATIVE PERCENT: 7.2 %
NEUTROPHILS ABSOLUTE: 5.6 K/UL (ref 1.7–7.7)
NEUTROPHILS RELATIVE PERCENT: 72.7 %
NITRITE, URINE: NEGATIVE
PDW BLD-RTO: 15.9 % (ref 12.4–15.4)
PH UA: 8 (ref 5–8)
PLATELET # BLD: 358 K/UL (ref 135–450)
PMV BLD AUTO: 8.7 FL (ref 5–10.5)
POTASSIUM REFLEX MAGNESIUM: 4 MMOL/L (ref 3.5–5.1)
PROTEIN UA: NEGATIVE MG/DL
RBC # BLD: 3.75 M/UL (ref 4–5.2)
RBC UA: ABNORMAL /HPF (ref 0–2)
SODIUM BLD-SCNC: 135 MMOL/L (ref 136–145)
SPECIFIC GRAVITY UA: 1.01 (ref 1–1.03)
TOTAL PROTEIN: 8 G/DL (ref 6.4–8.2)
URINE TYPE: ABNORMAL
UROBILINOGEN, URINE: 0.2 E.U./DL
WBC # BLD: 7.6 K/UL (ref 4–11)
WBC UA: ABNORMAL /HPF (ref 0–5)

## 2019-09-08 PROCEDURE — 93005 ELECTROCARDIOGRAM TRACING: CPT | Performed by: EMERGENCY MEDICINE

## 2019-09-08 PROCEDURE — 84703 CHORIONIC GONADOTROPIN ASSAY: CPT

## 2019-09-08 PROCEDURE — 85025 COMPLETE CBC W/AUTO DIFF WBC: CPT

## 2019-09-08 PROCEDURE — 80053 COMPREHEN METABOLIC PANEL: CPT

## 2019-09-08 PROCEDURE — 83690 ASSAY OF LIPASE: CPT

## 2019-09-08 PROCEDURE — 6360000002 HC RX W HCPCS: Performed by: EMERGENCY MEDICINE

## 2019-09-08 PROCEDURE — 96374 THER/PROPH/DIAG INJ IV PUSH: CPT

## 2019-09-08 PROCEDURE — 81001 URINALYSIS AUTO W/SCOPE: CPT

## 2019-09-08 PROCEDURE — 99284 EMERGENCY DEPT VISIT MOD MDM: CPT

## 2019-09-08 PROCEDURE — 6370000000 HC RX 637 (ALT 250 FOR IP): Performed by: EMERGENCY MEDICINE

## 2019-09-08 PROCEDURE — 96375 TX/PRO/DX INJ NEW DRUG ADDON: CPT

## 2019-09-08 RX ORDER — ONDANSETRON 4 MG/1
4 TABLET, ORALLY DISINTEGRATING ORAL EVERY 8 HOURS PRN
Qty: 12 TABLET | Refills: 1 | Status: SHIPPED | OUTPATIENT
Start: 2019-09-08 | End: 2021-01-25

## 2019-09-08 RX ORDER — OMEPRAZOLE 40 MG/1
40 CAPSULE, DELAYED RELEASE ORAL DAILY
Qty: 30 CAPSULE | Refills: 0 | Status: SHIPPED | OUTPATIENT
Start: 2019-09-08 | End: 2020-09-09 | Stop reason: SDUPTHER

## 2019-09-08 RX ORDER — KETOROLAC TROMETHAMINE 30 MG/ML
30 INJECTION, SOLUTION INTRAMUSCULAR; INTRAVENOUS ONCE
Status: COMPLETED | OUTPATIENT
Start: 2019-09-08 | End: 2019-09-08

## 2019-09-08 RX ORDER — ONDANSETRON 2 MG/ML
4 INJECTION INTRAMUSCULAR; INTRAVENOUS ONCE
Status: COMPLETED | OUTPATIENT
Start: 2019-09-08 | End: 2019-09-08

## 2019-09-08 RX ADMIN — KETOROLAC TROMETHAMINE 30 MG: 30 INJECTION, SOLUTION INTRAMUSCULAR; INTRAVENOUS at 16:02

## 2019-09-08 RX ADMIN — ONDANSETRON 4 MG: 2 INJECTION INTRAMUSCULAR; INTRAVENOUS at 16:02

## 2019-09-08 RX ADMIN — LIDOCAINE HYDROCHLORIDE: 20 SOLUTION ORAL; TOPICAL at 16:02

## 2019-09-08 ASSESSMENT — PAIN SCALES - GENERAL
PAINLEVEL_OUTOF10: 2
PAINLEVEL_OUTOF10: 9
PAINLEVEL_OUTOF10: 9

## 2019-09-08 ASSESSMENT — PAIN DESCRIPTION - DESCRIPTORS: DESCRIPTORS: DISCOMFORT

## 2019-09-08 ASSESSMENT — PAIN DESCRIPTION - PAIN TYPE: TYPE: ACUTE PAIN

## 2019-09-08 ASSESSMENT — PAIN DESCRIPTION - LOCATION: LOCATION: ABDOMEN

## 2019-09-08 NOTE — ED PROVIDER NOTES
TRIAGE CHIEF COMPLAINT:   Chief Complaint   Patient presents with    Abdominal Pain       HPI: Sekou Valenzuela is a 49 y.o. female who presents to the emergency department with complaint of severe epigastric abdominal pain. Pain started about 3 weeks ago. It has been getting worse. She has been using Felicitas-Farley and chewing Tums which gives only temporary relief. She has had nausea but no vomiting or diarrhea. She has a history of chronic intermittent constipation but had a normal bowel movement today. Denies melena or rectal bleeding. No chest pain or shortness of breath. The patient had pain similar to this a few years ago when her reflux flared up. She saw a GI doctor and had a negative EGD and negative colonoscopy. She is unsure of the diagnosis. She was given several medications which did not help but eventually the symptoms got better. She has a history of hypertension, diabetes, hyperlipidemia, uterine fibroids, chronic constipation and reflux. She has a PPI at home but does not take it on a regular basis. The patient has a history of microcytic anemia with hemoglobin of 10.5 in November 2017. REVIEW OF SYSTEMS:   10 systems reviewed. Pertinent positives per HPI. Otherwise noted to be negative. I have reviewed the triage/nursing documentation and agree unless otherwise noted below.     PAST MEDICAL HISTORY:   Past Medical History:   Diagnosis Date    Arthritis of left acromioclavicular joint 6/19/2017    Essential hypertension 4/16/2018    Fibroids     uterine    Type II or unspecified type diabetes mellitus without mention of complication, not stated as uncontrolled         CURRENT MEDICATIONS:   Patient's Medications   New Prescriptions    No medications on file   Previous Medications    ASPIRIN 81 MG EC TABLET    TAKE 1 TABLET BY MOUTH EVERY DAY    B-D ULTRAFINE III SHORT PEN 31G X 8 MM MISC    USE AS DIRECTED TO INJECT INSULIN EVERY DAY    JERRELL PINON 100 UNIT/ML INJECTION interpreted by myself. Sinus rhythm at a rate of 78. Axis is 24. Low voltage present. No ectopy. There is no ischemia. QTC is 453.     Radiology:      LAB  Labs Reviewed   URINALYSIS - Abnormal; Notable for the following components:       Result Value    Clarity, UA SL CLOUDY (*)     Blood, Urine MODERATE (*)     Leukocyte Esterase, Urine TRACE (*)     All other components within normal limits    Narrative:     Performed at:  [address] [place] [address]   Phone [phone]   MICROSCOPIC URINALYSIS - Abnormal; Notable for the following components:    Amorphous, UA 1+ (*)     All other components within normal limits    Narrative:     Performed at:  [address] [place] [address]   Phone [phone]   CBC WITH AUTO DIFFERENTIAL - Abnormal; Notable for the following components:    RBC 3.75 (*)     Hemoglobin 9.2 (*)     Hematocrit 28.8 (*)     MCV 76.8 (*)     MCH 24.5 (*)     RDW 15.9 (*)     All other components within normal limits    Narrative:     Performed at:  [address] [place] [address]   Phone [phone]   COMPREHENSIVE METABOLIC PANEL W/ REFLEX TO MG FOR LOW K - Abnormal; Notable for the following components:    Sodium 135 (*)     Chloride 95 (*)     Glucose 218 (*)     GFR Non- 58 (*)     All other components within normal limits    Narrative:     Performed at:  [address] [place] [address]   Phone [phone]   PREGNANCY, URINE    Narrative:     Performed at:  [address] [place] [address]   Phone [phone]   LIPASE    Narrative:     Performed at:  [address] [place] [address]   Phone [phone]

## 2019-09-09 LAB
EKG ATRIAL RATE: 78 BPM
EKG DIAGNOSIS: NORMAL
EKG P AXIS: 66 DEGREES
EKG P-R INTERVAL: 200 MS
EKG Q-T INTERVAL: 398 MS
EKG QRS DURATION: 74 MS
EKG QTC CALCULATION (BAZETT): 453 MS
EKG R AXIS: 24 DEGREES
EKG T AXIS: 24 DEGREES
EKG VENTRICULAR RATE: 78 BPM

## 2019-09-09 PROCEDURE — 93010 ELECTROCARDIOGRAM REPORT: CPT | Performed by: INTERNAL MEDICINE

## 2019-09-12 DIAGNOSIS — E11.21 TYPE 2 DIABETES MELLITUS WITH DIABETIC NEPHROPATHY, WITH LONG-TERM CURRENT USE OF INSULIN (HCC): ICD-10-CM

## 2019-09-12 DIAGNOSIS — Z79.4 TYPE 2 DIABETES MELLITUS WITH DIABETIC NEPHROPATHY, WITH LONG-TERM CURRENT USE OF INSULIN (HCC): ICD-10-CM

## 2019-09-13 LAB
ESTIMATED AVERAGE GLUCOSE: 205.9 MG/DL
HBA1C MFR BLD: 8.8 %

## 2019-09-16 ENCOUNTER — OFFICE VISIT (OUTPATIENT)
Dept: ENDOCRINOLOGY | Age: 54
End: 2019-09-16
Payer: COMMERCIAL

## 2019-09-16 VITALS
HEART RATE: 104 BPM | DIASTOLIC BLOOD PRESSURE: 78 MMHG | SYSTOLIC BLOOD PRESSURE: 116 MMHG | BODY MASS INDEX: 39.87 KG/M2 | OXYGEN SATURATION: 98 % | WEIGHT: 254 LBS | HEIGHT: 67 IN

## 2019-09-16 DIAGNOSIS — E78.5 DYSLIPIDEMIA ASSOCIATED WITH TYPE 2 DIABETES MELLITUS (HCC): ICD-10-CM

## 2019-09-16 DIAGNOSIS — E66.01 MORBID OBESITY DUE TO EXCESS CALORIES (HCC): ICD-10-CM

## 2019-09-16 DIAGNOSIS — L68.0 HIRSUTISM: ICD-10-CM

## 2019-09-16 DIAGNOSIS — G47.33 OBSTRUCTIVE SLEEP APNEA SYNDROME: ICD-10-CM

## 2019-09-16 DIAGNOSIS — E11.69 DYSLIPIDEMIA ASSOCIATED WITH TYPE 2 DIABETES MELLITUS (HCC): ICD-10-CM

## 2019-09-16 DIAGNOSIS — I10 ESSENTIAL HYPERTENSION: ICD-10-CM

## 2019-09-16 DIAGNOSIS — E11.21 TYPE 2 DIABETES MELLITUS WITH DIABETIC NEPHROPATHY, WITH LONG-TERM CURRENT USE OF INSULIN (HCC): Primary | ICD-10-CM

## 2019-09-16 DIAGNOSIS — Z79.4 TYPE 2 DIABETES MELLITUS WITH DIABETIC NEPHROPATHY, WITH LONG-TERM CURRENT USE OF INSULIN (HCC): Primary | ICD-10-CM

## 2019-09-16 PROCEDURE — 2022F DILAT RTA XM EVC RTNOPTHY: CPT | Performed by: INTERNAL MEDICINE

## 2019-09-16 PROCEDURE — 3045F PR MOST RECENT HEMOGLOBIN A1C LEVEL 7.0-9.0%: CPT | Performed by: INTERNAL MEDICINE

## 2019-09-16 PROCEDURE — 1036F TOBACCO NON-USER: CPT | Performed by: INTERNAL MEDICINE

## 2019-09-16 PROCEDURE — G8427 DOCREV CUR MEDS BY ELIG CLIN: HCPCS | Performed by: INTERNAL MEDICINE

## 2019-09-16 PROCEDURE — 3017F COLORECTAL CA SCREEN DOC REV: CPT | Performed by: INTERNAL MEDICINE

## 2019-09-16 PROCEDURE — 99214 OFFICE O/P EST MOD 30 MIN: CPT | Performed by: INTERNAL MEDICINE

## 2019-09-16 PROCEDURE — G8417 CALC BMI ABV UP PARAM F/U: HCPCS | Performed by: INTERNAL MEDICINE

## 2019-09-16 NOTE — PROGRESS NOTES
abnormalityAbnormal ECGConfirmed by [facility] SAILAJA GOOD MD ([id number]) on 9/9/2019 7:25:57 AM   Final    WBC 09/08/2019 7.6  4.0 - 11.0 K/uL Final    RBC 09/08/2019 3.75* 4.00 - 5.20 M/uL Final    Hemoglobin 09/08/2019 9.2* 12.0 - 16.0 g/dL Final    Hematocrit 09/08/2019 28.8* 36.0 - 48.0 % Final    MCV 09/08/2019 76.8* 80.0 - 100.0 fL Final    MCH 09/08/2019 24.5* 26.0 - 34.0 pg Final    MCHC 09/08/2019 31.9  31.0 - 36.0 g/dL Final    RDW 09/08/2019 15.9* 12.4 - 15.4 % Final    Platelets 19/35/2247 358  135 - 450 K/uL Final    MPV 09/08/2019 8.7  5.0 - 10.5 fL Final    Neutrophils % 09/08/2019 72.7  % Final    Lymphocytes % 09/08/2019 15.8  % Final    Monocytes % 09/08/2019 7.2  % Final    Eosinophils % 09/08/2019 3.1  % Final    Basophils % 09/08/2019 1.2  % Final    Neutrophils Absolute 09/08/2019 5.6  1.7 - 7.7 K/uL Final    Lymphocytes Absolute 09/08/2019 1.2  1.0 - 5.1 K/uL Final    Monocytes Absolute 09/08/2019 0.5  0.0 - 1.3 K/uL Final    Eosinophils Absolute 09/08/2019 0.2  0.0 - 0.6 K/uL Final    Basophils Absolute 09/08/2019 0.1  0.0 - 0.2 K/uL Final    Sodium 09/08/2019 135* 136 - 145 mmol/L Final    Potassium reflex Magnesium 09/08/2019 4.0  3.5 - 5.1 mmol/L Final    Chloride 09/08/2019 95* 99 - 110 mmol/L Final    CO2 09/08/2019 24  21 - 32 mmol/L Final    Anion Gap 09/08/2019 16  3 - 16 Final    Glucose 09/08/2019 218* 70 - 99 mg/dL Final    BUN 09/08/2019 16  7 - 20 mg/dL Final    CREATININE 09/08/2019 1.0  0.6 - 1.1 mg/dL Final    GFR Non- 09/08/2019 58* >60 Final    GFR  09/08/2019 >60  >60 Final    Calcium 09/08/2019 10.2  8.3 - 10.6 mg/dL Final    Total Protein 09/08/2019 8.0  6.4 - 8.2 g/dL Final    Alb 09/08/2019 4.2  3.4 - 5.0 g/dL Final    Albumin/Globulin Ratio 09/08/2019 1.1  1.1 - 2.2 Final    Total Bilirubin 09/08/2019 0.3  0.0 - 1.0 mg/dL Final    Alkaline Phosphatase 09/08/2019 129  40 - 129 U/L Final    ALT 09/08/2019 13  10 - 40 U/L Final    AST 09/08/2019 15  15 - 37 U/L Final    Globulin 09/08/2019 3.8  g/dL Final    Lipase 09/08/2019 36.0  13.0 - 60.0 U/L Final   Orders Only on 12/26/2018   Component Date Value Ref Range Status    Hemoglobin A1C 12/26/2018 8.9  See comment % Final    eAG 12/26/2018 208.7  mg/dL Final    Cholesterol, Total 12/26/2018 117  0 - 199 mg/dL Final    Triglycerides 12/26/2018 113  0 - 150 mg/dL Final    HDL 12/26/2018 48  40 - 60 mg/dL Final    LDL Calculated 12/26/2018 46  <100 mg/dL Final    VLDL Cholesterol Calculated 12/26/2018 23  Not Established mg/dL Final    Sodium 12/26/2018 135* 136 - 145 mmol/L Final    Potassium 12/26/2018 4.5  3.5 - 5.1 mmol/L Final    Chloride 12/26/2018 96* 99 - 110 mmol/L Final    CO2 12/26/2018 22  21 - 32 mmol/L Final    Anion Gap 12/26/2018 17* 3 - 16 Final    Glucose 12/26/2018 214* 70 - 99 mg/dL Final    BUN 12/26/2018 15  7 - 20 mg/dL Final    CREATININE 12/26/2018 0.8  0.6 - 1.1 mg/dL Final    GFR Non- 12/26/2018 >60  >60 Final    GFR  12/26/2018 >60  >60 Final    Calcium 12/26/2018 9.2  8.3 - 10.6 mg/dL Final    Total Protein 12/26/2018 7.0  6.4 - 8.2 g/dL Final    Alb 12/26/2018 4.1  3.4 - 5.0 g/dL Final    Albumin/Globulin Ratio 12/26/2018 1.4  1.1 - 2.2 Final    Total Bilirubin 12/26/2018 <0.2  0.0 - 1.0 mg/dL Final    Alkaline Phosphatase 12/26/2018 130* 40 - 129 U/L Final    ALT 12/26/2018 21  10 - 40 U/L Final    AST 12/26/2018 18  15 - 37 U/L Final    Globulin 12/26/2018 2.9  g/dL Final    Vitamin B-12 12/26/2018 531  211 - 911 pg/mL Final    Folate 12/26/2018 7.76  4.78 - 24.20 ng/mL Final           Assessment and Plan     Tosin was seen today for diabetes. Diagnoses and all orders for this visit:    Type 2 diabetes mellitus with diabetic nephropathy, with long-term current use of insulin ([facility] [address]  -     Hemoglobin A1C; Future  -     Microalbumin / Creatinine Urine Ratio;  Future    Dyslipidemia

## 2019-09-16 NOTE — PATIENT INSTRUCTIONS
rapid-acting insulin to take before you eat. If you use an insulin pump, you get a constant rate of insulin during the day. So the pump must be programmed at meals to give you extra insulin to cover the rise in blood sugar after meals. When you know how much carbohydrate you will eat, you can take the right amount of insulin. Or, if you always use the same amount of insulin, you need to make sure that you eat the same amount of carbohydrate at meals. If you need more help to understand carbohydrate counting and food labels, ask your doctor, dietitian, or diabetes educator. How do you get started with meal planning? Here are some tips to get started:  · Plan your meals a week at a time. Don't forget to include snacks too. · Use cookbooks or online recipes to plan several main meals. Plan some quick meals for busy nights. You also can double some recipes that freeze well. Then you can save half for other busy nights when you don't have time to cook. · Make sure you have the ingredients you need for your recipes. If you're running low on basic items, put these items on your shopping list too. · List foods that you use to make breakfasts, lunches, and snacks. List plenty of fruits and vegetables. · Post this list on the refrigerator. Add to it as you think of more things you need. · Take the list to the store to do your weekly shopping. Follow-up care is a key part of your treatment and safety. Be sure to make and go to all appointments, and call your doctor if you are having problems. It's also a good idea to know your test results and keep a list of the medicines you take. Where can you learn more? Go to https://[email or web address].[employer].com. org and sign in to your [employer] account. Enter M405 in the [facility] box to learn more about \"Learning About Meal Planning for Diabetes. \"     If you do not have an account, please click on the \"Sign Up Now\" link.   Current as of: July 25, 2018  Content

## 2019-10-10 RX ORDER — ERGOCALCIFEROL 1.25 MG/1
CAPSULE ORAL
Qty: 4 CAPSULE | Refills: 1 | Status: SHIPPED | OUTPATIENT
Start: 2019-10-10 | End: 2020-04-20

## 2019-10-13 DIAGNOSIS — L68.0 HIRSUTISM: ICD-10-CM

## 2019-10-14 RX ORDER — SPIRONOLACTONE 50 MG/1
TABLET, FILM COATED ORAL
Qty: 30 TABLET | Refills: 5 | Status: SHIPPED | OUTPATIENT
Start: 2019-10-14 | End: 2020-04-14

## 2019-10-19 DIAGNOSIS — E11.21 TYPE 2 DIABETES MELLITUS WITH DIABETIC NEPHROPATHY, WITH LONG-TERM CURRENT USE OF INSULIN (HCC): ICD-10-CM

## 2019-10-19 DIAGNOSIS — Z79.4 TYPE 2 DIABETES MELLITUS WITH DIABETIC NEPHROPATHY, WITH LONG-TERM CURRENT USE OF INSULIN (HCC): ICD-10-CM

## 2019-10-21 RX ORDER — DULAGLUTIDE 1.5 MG/.5ML
INJECTION, SOLUTION SUBCUTANEOUS
Qty: 4 PEN | Refills: 0 | Status: SHIPPED | OUTPATIENT
Start: 2019-10-21 | End: 2019-11-18 | Stop reason: SDUPTHER

## 2019-11-13 DIAGNOSIS — I10 ESSENTIAL HYPERTENSION: ICD-10-CM

## 2019-11-13 RX ORDER — HYDROCHLOROTHIAZIDE 25 MG/1
TABLET ORAL
Qty: 30 TABLET | Refills: 5 | OUTPATIENT
Start: 2019-11-13

## 2019-11-14 RX ORDER — HYDROCHLOROTHIAZIDE 25 MG/1
TABLET ORAL
Qty: 30 TABLET | Refills: 5 | OUTPATIENT
Start: 2019-11-14

## 2019-11-18 DIAGNOSIS — E11.21 TYPE 2 DIABETES MELLITUS WITH DIABETIC NEPHROPATHY, WITH LONG-TERM CURRENT USE OF INSULIN (HCC): ICD-10-CM

## 2019-11-18 DIAGNOSIS — Z79.4 TYPE 2 DIABETES MELLITUS WITH DIABETIC NEPHROPATHY, WITH LONG-TERM CURRENT USE OF INSULIN (HCC): ICD-10-CM

## 2019-11-18 RX ORDER — DULAGLUTIDE 1.5 MG/.5ML
INJECTION, SOLUTION SUBCUTANEOUS
Qty: 4 PEN | Refills: 3 | Status: SHIPPED | OUTPATIENT
Start: 2019-11-18 | End: 2019-11-21

## 2019-11-21 DIAGNOSIS — E11.21 TYPE 2 DIABETES MELLITUS WITH DIABETIC NEPHROPATHY, WITH LONG-TERM CURRENT USE OF INSULIN (HCC): ICD-10-CM

## 2019-11-21 DIAGNOSIS — Z79.4 TYPE 2 DIABETES MELLITUS WITH DIABETIC NEPHROPATHY, WITH LONG-TERM CURRENT USE OF INSULIN (HCC): ICD-10-CM

## 2019-11-21 RX ORDER — DULAGLUTIDE 1.5 MG/.5ML
INJECTION, SOLUTION SUBCUTANEOUS
Qty: 4 PEN | Refills: 0 | Status: SHIPPED | OUTPATIENT
Start: 2019-11-21 | End: 2020-03-31

## 2019-11-27 DIAGNOSIS — E11.21 TYPE 2 DIABETES MELLITUS WITH DIABETIC NEPHROPATHY, WITH LONG-TERM CURRENT USE OF INSULIN (HCC): ICD-10-CM

## 2019-11-27 DIAGNOSIS — Z79.4 TYPE 2 DIABETES MELLITUS WITH DIABETIC NEPHROPATHY, WITH LONG-TERM CURRENT USE OF INSULIN (HCC): ICD-10-CM

## 2019-11-27 RX ORDER — METFORMIN HYDROCHLORIDE 500 MG/1
TABLET, EXTENDED RELEASE ORAL
Qty: 124 TABLET | Refills: 5 | Status: SHIPPED | OUTPATIENT
Start: 2019-11-27 | End: 2020-06-01 | Stop reason: SDUPTHER

## 2019-12-04 ENCOUNTER — OFFICE VISIT (OUTPATIENT)
Dept: SLEEP MEDICINE | Age: 54
End: 2019-12-04
Payer: COMMERCIAL

## 2019-12-04 VITALS
SYSTOLIC BLOOD PRESSURE: 138 MMHG | RESPIRATION RATE: 16 BRPM | HEART RATE: 100 BPM | DIASTOLIC BLOOD PRESSURE: 86 MMHG | BODY MASS INDEX: 40.49 KG/M2 | HEIGHT: 67 IN | WEIGHT: 258 LBS | OXYGEN SATURATION: 97 %

## 2019-12-04 DIAGNOSIS — E78.5 DYSLIPIDEMIA ASSOCIATED WITH TYPE 2 DIABETES MELLITUS (HCC): ICD-10-CM

## 2019-12-04 DIAGNOSIS — I10 ESSENTIAL HYPERTENSION: ICD-10-CM

## 2019-12-04 DIAGNOSIS — E11.69 DYSLIPIDEMIA ASSOCIATED WITH TYPE 2 DIABETES MELLITUS (HCC): ICD-10-CM

## 2019-12-04 DIAGNOSIS — G47.33 OBSTRUCTIVE SLEEP APNEA: Primary | ICD-10-CM

## 2019-12-04 DIAGNOSIS — E66.01 MORBID OBESITY WITH BMI OF 40.0-44.9, ADULT (HCC): ICD-10-CM

## 2019-12-04 PROCEDURE — G8417 CALC BMI ABV UP PARAM F/U: HCPCS | Performed by: PSYCHIATRY & NEUROLOGY

## 2019-12-04 PROCEDURE — 1036F TOBACCO NON-USER: CPT | Performed by: PSYCHIATRY & NEUROLOGY

## 2019-12-04 PROCEDURE — G8484 FLU IMMUNIZE NO ADMIN: HCPCS | Performed by: PSYCHIATRY & NEUROLOGY

## 2019-12-04 PROCEDURE — 2022F DILAT RTA XM EVC RTNOPTHY: CPT | Performed by: PSYCHIATRY & NEUROLOGY

## 2019-12-04 PROCEDURE — 99204 OFFICE O/P NEW MOD 45 MIN: CPT | Performed by: PSYCHIATRY & NEUROLOGY

## 2019-12-04 PROCEDURE — G8427 DOCREV CUR MEDS BY ELIG CLIN: HCPCS | Performed by: PSYCHIATRY & NEUROLOGY

## 2019-12-04 PROCEDURE — 3017F COLORECTAL CA SCREEN DOC REV: CPT | Performed by: PSYCHIATRY & NEUROLOGY

## 2019-12-04 ASSESSMENT — SLEEP AND FATIGUE QUESTIONNAIRES
HOW LIKELY ARE YOU TO NOD OFF OR FALL ASLEEP WHILE SITTING QUIETLY AFTER LUNCH WITHOUT ALCOHOL: 0
NECK CIRCUMFERENCE (INCHES): 16.5
HOW LIKELY ARE YOU TO NOD OFF OR FALL ASLEEP WHILE SITTING AND TALKING TO SOMEONE: 0
ESS TOTAL SCORE: 3
HOW LIKELY ARE YOU TO NOD OFF OR FALL ASLEEP WHEN YOU ARE A PASSENGER IN A CAR FOR AN HOUR WITHOUT A BREAK: 0
HOW LIKELY ARE YOU TO NOD OFF OR FALL ASLEEP WHILE LYING DOWN TO REST IN THE AFTERNOON WHEN CIRCUMSTANCES PERMIT: 1
HOW LIKELY ARE YOU TO NOD OFF OR FALL ASLEEP IN A CAR, WHILE STOPPED FOR A FEW MINUTES IN TRAFFIC: 0
HOW LIKELY ARE YOU TO NOD OFF OR FALL ASLEEP WHILE SITTING AND READING: 0
HOW LIKELY ARE YOU TO NOD OFF OR FALL ASLEEP WHILE SITTING INACTIVE IN A PUBLIC PLACE: 0
HOW LIKELY ARE YOU TO NOD OFF OR FALL ASLEEP WHILE WATCHING TV: 2

## 2019-12-04 ASSESSMENT — ENCOUNTER SYMPTOMS
APNEA: 0
EYES NEGATIVE: 1
ABDOMINAL PAIN: 1
ALLERGIC/IMMUNOLOGIC NEGATIVE: 1
CHOKING: 0

## 2019-12-13 ENCOUNTER — TELEPHONE (OUTPATIENT)
Dept: GYNECOLOGY | Age: 54
End: 2019-12-13

## 2019-12-13 DIAGNOSIS — R35.0 FREQUENCY OF URINATION: ICD-10-CM

## 2019-12-13 DIAGNOSIS — R10.2 PELVIC PRESSURE IN FEMALE: Primary | ICD-10-CM

## 2019-12-18 ENCOUNTER — HOSPITAL ENCOUNTER (OUTPATIENT)
Dept: SLEEP CENTER | Age: 54
Discharge: HOME | End: 2019-12-18
Payer: COMMERCIAL

## 2019-12-18 DIAGNOSIS — I10 ESSENTIAL HYPERTENSION: ICD-10-CM

## 2019-12-18 DIAGNOSIS — E66.01 MORBID OBESITY WITH BMI OF 40.0-44.9, ADULT (HCC): ICD-10-CM

## 2019-12-18 DIAGNOSIS — G47.33 OBSTRUCTIVE SLEEP APNEA: ICD-10-CM

## 2019-12-18 PROCEDURE — 95806 SLEEP STUDY UNATT&RESP EFFT: CPT | Performed by: PSYCHIATRY & NEUROLOGY

## 2019-12-18 PROCEDURE — 95806 SLEEP STUDY UNATT&RESP EFFT: CPT

## 2019-12-23 ENCOUNTER — HOSPITAL ENCOUNTER (OUTPATIENT)
Dept: ULTRASOUND IMAGING | Age: 54
Discharge: HOME | End: 2019-12-23
Payer: COMMERCIAL

## 2019-12-23 DIAGNOSIS — R35.0 FREQUENCY OF URINATION: ICD-10-CM

## 2019-12-23 DIAGNOSIS — R10.2 PELVIC PRESSURE IN FEMALE: ICD-10-CM

## 2019-12-23 PROCEDURE — 76830 TRANSVAGINAL US NON-OB: CPT

## 2019-12-23 PROCEDURE — 76856 US EXAM PELVIC COMPLETE: CPT

## 2019-12-23 PROCEDURE — 87086 URINE CULTURE/COLONY COUNT: CPT

## 2019-12-24 DIAGNOSIS — M79.2 NEUROPATHIC PAIN: ICD-10-CM

## 2019-12-24 LAB — URINE CULTURE, ROUTINE: NORMAL

## 2019-12-26 ENCOUNTER — TELEPHONE (OUTPATIENT)
Dept: PULMONOLOGY | Age: 54
End: 2019-12-26

## 2019-12-26 DIAGNOSIS — N39.0 URINARY TRACT INFECTION WITHOUT HEMATURIA, SITE UNSPECIFIED: Primary | ICD-10-CM

## 2019-12-26 RX ORDER — NITROFURANTOIN 25; 75 MG/1; MG/1
100 CAPSULE ORAL 2 TIMES DAILY
Qty: 14 CAPSULE | Refills: 0 | OUTPATIENT
Start: 2019-12-26 | End: 2020-01-02

## 2020-01-16 ENCOUNTER — OFFICE VISIT (OUTPATIENT)
Dept: GYNECOLOGY | Age: 55
End: 2020-01-16
Payer: COMMERCIAL

## 2020-01-16 VITALS
SYSTOLIC BLOOD PRESSURE: 138 MMHG | HEIGHT: 67 IN | BODY MASS INDEX: 40.15 KG/M2 | RESPIRATION RATE: 17 BRPM | DIASTOLIC BLOOD PRESSURE: 85 MMHG | HEART RATE: 87 BPM | WEIGHT: 255.8 LBS

## 2020-01-16 LAB
BACTERIA WET PREP: NORMAL
CLUE CELLS: NORMAL
EPITHELIAL CELLS WET PREP: NORMAL
RBC WET PREP: NORMAL
SOURCE WET PREP: NORMAL
TRICHOMONAS PREP: NORMAL
WBC WET PREP: NORMAL
YEAST WET PREP: NORMAL

## 2020-01-16 PROCEDURE — 99396 PREV VISIT EST AGE 40-64: CPT | Performed by: OBSTETRICS & GYNECOLOGY

## 2020-01-16 PROCEDURE — G8484 FLU IMMUNIZE NO ADMIN: HCPCS | Performed by: OBSTETRICS & GYNECOLOGY

## 2020-01-16 RX ORDER — FLUCONAZOLE 100 MG/1
100 TABLET ORAL DAILY
Qty: 7 TABLET | Refills: 0 | Status: SHIPPED | OUTPATIENT
Start: 2020-01-16 | End: 2020-01-23

## 2020-01-16 RX ORDER — NYSTATIN 100000 [USP'U]/G
POWDER TOPICAL 2 TIMES DAILY
Qty: 1 BOTTLE | Refills: 2 | Status: SHIPPED | OUTPATIENT
Start: 2020-01-16 | End: 2020-01-16 | Stop reason: SDUPTHER

## 2020-01-16 RX ORDER — NYSTATIN 100000 [USP'U]/G
POWDER TOPICAL 2 TIMES DAILY
Qty: 1 BOTTLE | Refills: 2 | Status: SHIPPED | OUTPATIENT
Start: 2020-01-16 | End: 2020-01-30

## 2020-01-16 RX ORDER — LOSARTAN POTASSIUM 100 MG/1
TABLET ORAL
Qty: 30 TABLET | Refills: 5 | OUTPATIENT
Start: 2020-01-16

## 2020-01-17 DIAGNOSIS — E11.69 DYSLIPIDEMIA ASSOCIATED WITH TYPE 2 DIABETES MELLITUS (HCC): ICD-10-CM

## 2020-01-17 DIAGNOSIS — E78.5 DYSLIPIDEMIA ASSOCIATED WITH TYPE 2 DIABETES MELLITUS (HCC): ICD-10-CM

## 2020-01-17 DIAGNOSIS — Z79.4 TYPE 2 DIABETES MELLITUS WITH DIABETIC NEPHROPATHY, WITH LONG-TERM CURRENT USE OF INSULIN (HCC): ICD-10-CM

## 2020-01-17 DIAGNOSIS — E11.21 TYPE 2 DIABETES MELLITUS WITH DIABETIC NEPHROPATHY, WITH LONG-TERM CURRENT USE OF INSULIN (HCC): ICD-10-CM

## 2020-01-17 LAB
CHOLESTEROL, FASTING: 115 MG/DL (ref 0–199)
CREATININE URINE: 151.6 MG/DL (ref 28–259)
HDLC SERPL-MCNC: 55 MG/DL (ref 40–60)
LDL CHOLESTEROL CALCULATED: 50 MG/DL
MICROALBUMIN UR-MCNC: <1.2 MG/DL
MICROALBUMIN/CREAT UR-RTO: NORMAL MG/G (ref 0–30)
TRIGLYCERIDE, FASTING: 51 MG/DL (ref 0–150)
VLDLC SERPL CALC-MCNC: 10 MG/DL

## 2020-01-17 RX ORDER — NYSTATIN 100000 [USP'U]/G
POWDER TOPICAL 2 TIMES DAILY
Qty: 1 BOTTLE | Refills: 2 | Status: SHIPPED | OUTPATIENT
Start: 2020-01-17 | End: 2022-01-10 | Stop reason: ALTCHOICE

## 2020-01-17 RX ORDER — FLUCONAZOLE 100 MG/1
100 TABLET ORAL DAILY
Qty: 7 TABLET | Refills: 0 | Status: SHIPPED | OUTPATIENT
Start: 2020-01-17 | End: 2020-01-24

## 2020-01-18 LAB
ESTIMATED AVERAGE GLUCOSE: 208.7 MG/DL
GENITAL CULTURE, ROUTINE: NORMAL
HBA1C MFR BLD: 8.9 %

## 2020-01-19 ASSESSMENT — ENCOUNTER SYMPTOMS
RESPIRATORY NEGATIVE: 1
EYES NEGATIVE: 1
GASTROINTESTINAL NEGATIVE: 1

## 2020-01-20 ENCOUNTER — OFFICE VISIT (OUTPATIENT)
Dept: ENDOCRINOLOGY | Age: 55
End: 2020-01-20
Payer: COMMERCIAL

## 2020-01-20 VITALS
SYSTOLIC BLOOD PRESSURE: 136 MMHG | WEIGHT: 252.6 LBS | BODY MASS INDEX: 39.65 KG/M2 | OXYGEN SATURATION: 98 % | HEIGHT: 67 IN | HEART RATE: 89 BPM | RESPIRATION RATE: 18 BRPM | DIASTOLIC BLOOD PRESSURE: 80 MMHG

## 2020-01-20 PROBLEM — E66.01 MORBID OBESITY WITH BMI OF 40.0-44.9, ADULT (HCC): Status: RESOLVED | Noted: 2017-06-19 | Resolved: 2020-01-20

## 2020-01-20 PROBLEM — E11.9 DIABETES MELLITUS (HCC): Status: ACTIVE | Noted: 2020-01-20

## 2020-01-20 PROBLEM — E11.3299 TYPE 2 DIABETES MELLITUS WITH BACKGROUND RETINOPATHY (HCC): Status: ACTIVE | Noted: 2020-01-20

## 2020-01-20 PROCEDURE — 2022F DILAT RTA XM EVC RTNOPTHY: CPT | Performed by: INTERNAL MEDICINE

## 2020-01-20 PROCEDURE — G8484 FLU IMMUNIZE NO ADMIN: HCPCS | Performed by: INTERNAL MEDICINE

## 2020-01-20 PROCEDURE — G8427 DOCREV CUR MEDS BY ELIG CLIN: HCPCS | Performed by: INTERNAL MEDICINE

## 2020-01-20 PROCEDURE — G8417 CALC BMI ABV UP PARAM F/U: HCPCS | Performed by: INTERNAL MEDICINE

## 2020-01-20 PROCEDURE — 99214 OFFICE O/P EST MOD 30 MIN: CPT | Performed by: INTERNAL MEDICINE

## 2020-01-20 PROCEDURE — 3017F COLORECTAL CA SCREEN DOC REV: CPT | Performed by: INTERNAL MEDICINE

## 2020-01-20 PROCEDURE — 1036F TOBACCO NON-USER: CPT | Performed by: INTERNAL MEDICINE

## 2020-01-20 RX ORDER — LOSARTAN POTASSIUM 100 MG/1
TABLET ORAL
Qty: 30 TABLET | Refills: 5 | Status: SHIPPED | OUTPATIENT
Start: 2020-01-20 | End: 2020-07-16

## 2020-01-20 RX ORDER — HYDROCHLOROTHIAZIDE 25 MG/1
TABLET ORAL
Qty: 30 TABLET | Refills: 5 | Status: SHIPPED | OUTPATIENT
Start: 2020-01-20 | End: 2020-07-16

## 2020-01-20 NOTE — PROGRESS NOTES
 Seizures Neg Hx     Thyroid Disease Neg Hx          Social History     Socioeconomic History    Marital status: Single     Spouse name: Not on file    Number of children: Not on file    Years of education: Not on file    Highest education level: Not on file   Occupational History    Not on file   Social Needs    Financial resource strain: Not on file    Food insecurity:     Worry: Not on file     Inability: Not on file    Transportation needs:     Medical: Not on file     Non-medical: Not on file   Tobacco Use    Smoking status: Former Smoker     Packs/day: 0.25     Years: 10.00     Pack years: 2.50    Smokeless tobacco: Former User     Quit date: 6/6/2014    Tobacco comment: quit 2 years ago   Substance and Sexual Activity    Alcohol use:  Yes     Alcohol/week: 0.0 standard drinks     Comment: socially    Drug use: No    Sexual activity: Not Currently     Partners: Male   Lifestyle    Physical activity:     Days per week: Not on file     Minutes per session: Not on file    Stress: Not on file   Relationships    Social connections:     Talks on phone: Not on file     Gets together: Not on file     Attends Jainism service: Not on file     Active member of club or organization: Not on file     Attends meetings of clubs or organizations: Not on file     Relationship status: Not on file    Intimate partner violence:     Fear of current or ex partner: Not on file     Emotionally abused: Not on file     Physically abused: Not on file     Forced sexual activity: Not on file   Other Topics Concern    Not on file   Social History Narrative    Not on file       Past Medical History:   Diagnosis Date    Arthritis of left acromioclavicular joint 6/19/2017    Essential hypertension 4/16/2018    Fibroid     Fibroids     uterine    Type II or unspecified type diabetes mellitus without mention of complication, not stated as uncontrolled     Urogenital trichomoniasis        Past Surgical History: Rfl: 2    celecoxib (CELEBREX) 200 MG capsule, TAKE 1 CAPSULE BY MOUTH EVERY DAY (Patient taking differently: TAKE 1 CAPSULE BY MOUTH AS NEEDED), Disp: 30 capsule, Rfl: 2    Cholecalciferol (VITAMIN D3) 02694 units CAPS, once a week , Disp: , Rfl:     B-D ULTRAFINE III SHORT PEN 31G X 8 MM MISC, USE AS DIRECTED TO INJECT INSULIN EVERY DAY, Disp: , Rfl: 3    omeprazole (PRILOSEC) 40 MG delayed release capsule, Take 1 capsule by mouth daily for 30 doses, Disp: 30 capsule, Rfl: 0    pregabalin (LYRICA) 25 MG capsule, Take 1 capsule by mouth 2 times daily for 30 days. (Patient taking differently: Take 25 mg by mouth 2 times daily as needed. ), Disp: 60 capsule, Rfl: 1      Review of Systems:    Constitutional: Negative for fever, chills, and unexpected weight change. HENT: Negative for congestion, ear pain, rhinorrhea,  sore throat and trouble swallowing. Eyes: Negative for photophobia, redness, itching. Respiratory: Negative for cough, shortness of breath and sputum. Cardiovascular: Negative for chest pain, palpitations and leg swelling. Gastrointestinal: Negative for nausea, vomiting, abdominal pain, diarrhea, constipation. Endocrine: Negative for cold intolerance, heat intolerance, polydipsia, polyphagia and polyuria. Genitourinary: Negative for dysuria, urgency, frequency, hematuria and flank pain. Musculoskeletal: Negative for myalgias, back pain, arthralgias and neck pain. Skin/Nail: Negative for rash, itching. Normal nails. Neurological: Negative for seizures, weakness, light-headedness, numbness and headaches. Hematological/ Lymph nodes: Negative for adenopathy. Does not bruise/bleed easily. Psychiatric/Behavioral: Negative for suicidal ideas, depression, anxiety, sleep disturbance and decreased concentration.         Objective:   Physical Exam:  /80 (Site: Left Upper Arm, Position: Sitting, Cuff Size: Large Adult)   Pulse 89   Resp 18   Ht 5' 7\" (1.702 m)   Wt 252 lb 9.6 oz (114.6 kg)   LMP 12/30/2019   SpO2 98%   Breastfeeding No   BMI 39.56 kg/m²   Constitutional: Patient is oriented to person, place, and time. Patient appears well-developed and well-nourished. HENT:    Head: Normocephalic and atraumatic. Eyes: Conjunctivae and EOM are normal.      Neck: Normal range of motion. Cardiovascular: Normal rate, regular rhythm and normal heart sounds. Pulmonary/Chest: Effort normal and breath sounds normal.   Abdominal: Soft. Bowel sounds are normal.   Musculoskeletal: Normal range of motion. Neurological: Patient is alert and oriented to person, place, and time. Patient has normal reflexes. Skin: Skin is warm and dry. Psychiatric: Patient has a normal mood and affect.  Patient behavior is normal.     Lab Review:    Orders Only on 01/17/2020   Component Date Value Ref Range Status    Microalbumin, Random Urine 01/17/2020 <1.20  <2.0 mg/dL Final    Creatinine, Ur 01/17/2020 151.6  28.0 - 259.0 mg/dL Final    Microalbumin Creatinine Ratio 01/17/2020 see below  0.0 - 30.0 mg/g Final    Cholesterol, Fasting 01/17/2020 115  0 - 199 mg/dL Final    Triglyceride, Fasting 01/17/2020 51  0 - 150 mg/dL Final    HDL 01/17/2020 55  40 - 60 mg/dL Final    LDL Calculated 01/17/2020 50  <100 mg/dL Final    VLDL Cholesterol Calculated 01/17/2020 10  Not Established mg/dL Final    Hemoglobin A1C 01/17/2020 8.9  See comment % Final    eAG 01/17/2020 208.7  mg/dL Final   Office Visit on 01/16/2020   Component Date Value Ref Range Status    HPV TYPE 16 01/16/2020 Not Detected  Not Detected Final    HPV TYPE 18 01/16/2020 Not Detected  Not Detected Final    HPVOH (OTHER TYPES) 01/16/2020 Not Detected  Not Detected Final    HPV Comment 01/16/2020 See below   Final    Trichomonas Prep 01/16/2020 None Seen   Final    Yeast, Wet Prep 01/16/2020 None Seen   Final    Clue Cells, Wet Prep 01/16/2020 None Seen   Final    WBC, Wet Prep 01/16/2020 1+   Final    RBC, Wet Prep 01/16/2020 None Seen   Final    Epi Cells 01/16/2020 2+   Final    Bacteria 01/16/2020 2+   Final    Source Wet Prep 01/16/2020 Vaginal   Final    Genital Culture, Routine 01/16/2020 Normal genital microbiota isolated   Final   Hospital Outpatient Visit on 12/23/2019   Component Date Value Ref Range Status    Urine Culture, Routine 12/23/2019 <50,000 CFU/ml mixed skin/urogenital nathaniel.  No further workup   Final   Orders Only on 09/12/2019   Component Date Value Ref Range Status    Hemoglobin A1C 09/12/2019 8.8  See comment % Final    eAG 09/12/2019 205.9  mg/dL Final   Admission on 09/08/2019, Discharged on 09/08/2019   Component Date Value Ref Range Status    Color, UA 09/08/2019 Yellow  Straw/Yellow Final    Clarity, UA 09/08/2019 SL CLOUDY* Clear Final    Glucose, Ur 09/08/2019 Negative  Negative mg/dL Final    Bilirubin Urine 09/08/2019 Negative  Negative Final    Ketones, Urine 09/08/2019 Negative  Negative mg/dL Final    Specific Gravity, UA 09/08/2019 1.015  1.005 - 1.030 Final    Blood, Urine 09/08/2019 MODERATE* Negative Final    pH, UA 09/08/2019 8.0  5.0 - 8.0 Final    Protein, UA 09/08/2019 Negative  Negative mg/dL Final    Urobilinogen, Urine 09/08/2019 0.2  <2.0 E.U./dL Final    Nitrite, Urine 09/08/2019 Negative  Negative Final    Leukocyte Esterase, Urine 09/08/2019 TRACE* Negative Final    Microscopic Examination 09/08/2019 YES   Final    Urine Type 09/08/2019 Not Specified   Final    HCG(Urine) Pregnancy Test 09/08/2019 Negative  Detects HCG level >20 MIU/mL Final    WBC, UA 09/08/2019 0-2  0 - 5 /HPF Final    RBC, UA 09/08/2019 0-2  0 - 2 /HPF Final    Epi Cells 09/08/2019 10-20  /HPF Final    Amorphous, UA 09/08/2019 1+* /HPF Final    Ventricular Rate 09/08/2019 78  BPM Final    Atrial Rate 09/08/2019 78  BPM Final    P-R Interval 09/08/2019 200  ms Final    QRS Duration 09/08/2019 74  ms Final    Q-T Interval 09/08/2019 398  ms Final    QTc Calculation (Bazett) 09/08/2019 453  ms Final    P Axis 09/08/2019 66  degrees Final    R Axis 09/08/2019 24  degrees Final    T Axis 09/08/2019 24  degrees Final    Diagnosis 09/08/2019 Normal sinus rhythmLow voltage QRSCannot rule out Anterior infarct , age undeterminedNonspecific ST and T wave abnormalityAbnormal ECGConfirmed by [facility] - SAILAJA MARQUIS MD ([id number]) on 9/9/2019 7:25:57 AM   Final    WBC 09/08/2019 7.6  4.0 - 11.0 K/uL Final    RBC 09/08/2019 3.75* 4.00 - 5.20 M/uL Final    Hemoglobin 09/08/2019 9.2* 12.0 - 16.0 g/dL Final    Hematocrit 09/08/2019 28.8* 36.0 - 48.0 % Final    MCV 09/08/2019 76.8* 80.0 - 100.0 fL Final    MCH 09/08/2019 24.5* 26.0 - 34.0 pg Final    MCHC 09/08/2019 31.9  31.0 - 36.0 g/dL Final    RDW 09/08/2019 15.9* 12.4 - 15.4 % Final    Platelets 43/52/3863 358  135 - 450 K/uL Final    MPV 09/08/2019 8.7  5.0 - 10.5 fL Final    Neutrophils % 09/08/2019 72.7  % Final    Lymphocytes % 09/08/2019 15.8  % Final    Monocytes % 09/08/2019 7.2  % Final    Eosinophils % 09/08/2019 3.1  % Final    Basophils % 09/08/2019 1.2  % Final    Neutrophils Absolute 09/08/2019 5.6  1.7 - 7.7 K/uL Final    Lymphocytes Absolute 09/08/2019 1.2  1.0 - 5.1 K/uL Final    Monocytes Absolute 09/08/2019 0.5  0.0 - 1.3 K/uL Final    Eosinophils Absolute 09/08/2019 0.2  0.0 - 0.6 K/uL Final    Basophils Absolute 09/08/2019 0.1  0.0 - 0.2 K/uL Final    Sodium 09/08/2019 135* 136 - 145 mmol/L Final    Potassium reflex Magnesium 09/08/2019 4.0  3.5 - 5.1 mmol/L Final    Chloride 09/08/2019 95* 99 - 110 mmol/L Final    CO2 09/08/2019 24  21 - 32 mmol/L Final    Anion Gap 09/08/2019 16  3 - 16 Final    Glucose 09/08/2019 218* 70 - 99 mg/dL Final    BUN 09/08/2019 16  7 - 20 mg/dL Final    CREATININE 09/08/2019 1.0  0.6 - 1.1 mg/dL Final    GFR Non- 09/08/2019 58* >60 Final    GFR  09/08/2019 >60  >60 Final    Calcium 09/08/2019 10.2  8.3 - 10.6 mg/dL Final    Total Protein

## 2020-01-20 NOTE — PROGRESS NOTES
drinks     Comment: socially    Drug use: No    Sexual activity: Not Currently     Partners: Male   Lifestyle    Physical activity:     Days per week: Not on file     Minutes per session: Not on file    Stress: Not on file   Relationships    Social connections:     Talks on phone: Not on file     Gets together: Not on file     Attends Sikh service: Not on file     Active member of club or organization: Not on file     Attends meetings of clubs or organizations: Not on file     Relationship status: Not on file    Intimate partner violence:     Fear of current or ex partner: Not on file     Emotionally abused: Not on file     Physically abused: Not on file     Forced sexual activity: Not on file   Other Topics Concern    Not on file   Social History Narrative    Not on file     No Known Allergies  Outpatient Medications Marked as Taking for the 1/16/20 encounter (Office Visit) with Colten Celestin MD   Medication Sig Dispense Refill    fluconazole (DIFLUCAN) 100 MG tablet Take 1 tablet by mouth daily for 7 days 7 tablet 0    nystatin (MYCOSTATIN) 094915 UNIT/GM powder Apply topically 2 times daily 1 Bottle 2    fluconazole (DIFLUCAN) 100 MG tablet Take 1 tablet by mouth daily for 7 days 7 tablet 0    nystatin (MYCOSTATIN) 629203 UNIT/GM powder Apply topically 2 times daily for 14 days 1 Bottle 2    metFORMIN (GLUCOPHAGE-XR) 500 MG extended release tablet TAKE 2 TABLETS BY MOUTH TWICE A DAY WITH MEALS 124 tablet 5    TRULICITY 1.5 DAVIS/6.6XE SOPN INJECT 1 PEN INTO THE SKIN ONCE A WEEK 4 pen 0    spironolactone (ALDACTONE) 50 MG tablet TAKE 1 TABLET BY MOUTH EVERY DAY 30 tablet 5    vitamin D (ERGOCALCIFEROL) 24301 units CAPS capsule TAKE ONE CAPSULE BY MOUTH ONE TIME PER WEEK 4 capsule 1    ondansetron (ZOFRAN ODT) 4 MG disintegrating tablet Take 1 tablet by mouth every 8 hours as needed for Nausea or Vomiting May Sub regular tablet (non-ODT) if insurance does not cover ODT.  12 tablet 1    losartan (COZAAR) 100 MG tablet TAKE 1 TABLET BY MOUTH EVERY DAY 30 tablet 5    BASAGLAR KWIKPEN 100 UNIT/ML injection pen Inject 34 Units into the skin every morning (before breakfast) 15 pen 2    aspirin 81 MG EC tablet TAKE 1 TABLET BY MOUTH EVERY DAY 90 tablet 2    docusate sodium (COLACE) 100 MG capsule TAKE 1 CAPSULE BY MOUTH 2 TIMES DAILY AS NEEDED FOR CONSTIPATION 50 capsule 1    rosuvastatin (CRESTOR) 10 MG tablet TAKE 1 TABLET BY MOUTH EVERY DAY IN THE EVENING 90 tablet 2    celecoxib (CELEBREX) 200 MG capsule TAKE 1 CAPSULE BY MOUTH EVERY DAY (Patient taking differently: TAKE 1 CAPSULE BY MOUTH AS NEEDED) 30 capsule 2    Cholecalciferol (VITAMIN D3) 62956 units CAPS once a week       B-D ULTRAFINE III SHORT PEN 31G X 8 MM MISC USE AS DIRECTED TO INJECT INSULIN EVERY DAY  3     Family History   Problem Relation Age of Onset    Diabetes Mother     High Blood Pressure Mother     High Cholesterol Mother     High Blood Pressure Father     Heart Disease Sister     Diabetes Maternal Aunt     Stroke Maternal Aunt     Heart Disease Maternal Uncle     Cancer Maternal Uncle     Cancer Maternal Grandmother     Diabetes Maternal Grandfather     Diabetes Maternal Aunt     Stroke Maternal Aunt     Diabetes Maternal Aunt     Stroke Maternal Aunt     Heart Disease Maternal Aunt     No Known Problems Brother     No Known Problems Paternal Aunt     No Known Problems Paternal Uncle     No Known Problems Paternal Grandmother     No Known Problems Paternal Grandfather     No Known Problems Other     Rheum Arthritis Neg Hx     Osteoarthritis Neg Hx     Asthma Neg Hx     Breast Cancer Neg Hx     Heart Failure Neg Hx     Hypertension Neg Hx     Migraines Neg Hx     Ovarian Cancer Neg Hx     Rashes/Skin Problems Neg Hx     Seizures Neg Hx     Thyroid Disease Neg Hx      /85 (Site: Right Upper Arm, Position: Sitting, Cuff Size: Large Adult)   Pulse 87   Resp 17   Ht 5' 7\" (1.702 m) Wt 255 lb 12.8 oz (116 kg)   BMI 40.06 kg/m²       Objective:   Physical Exam  Constitutional:       General: She is not in acute distress. Appearance: Normal appearance. She is well-developed and normal weight. She is not diaphoretic. HENT:      Head: Normocephalic and atraumatic. Nose: Nose normal.      Mouth/Throat:      Mouth: Mucous membranes are moist.      Pharynx: Oropharynx is clear. Eyes:      Pupils: Pupils are equal, round, and reactive to light. Neck:      Musculoskeletal: Normal range of motion and neck supple. No neck rigidity. Thyroid: No thyromegaly. Cardiovascular:      Rate and Rhythm: Normal rate and regular rhythm. Heart sounds: Normal heart sounds. No murmur. No friction rub. No gallop. Pulmonary:      Effort: Pulmonary effort is normal. No respiratory distress. Breath sounds: Normal breath sounds. No wheezing or rales. Chest:      Breasts:         Right: Normal. No swelling, bleeding, inverted nipple, mass, nipple discharge, skin change or tenderness. Left: Normal. No swelling, bleeding, inverted nipple, mass, nipple discharge, skin change or tenderness. Abdominal:      General: Abdomen is flat. Bowel sounds are normal. There is no distension. Palpations: Abdomen is soft. There is no hepatomegaly or mass. Tenderness: There is no tenderness. There is no guarding or rebound. Hernia: No hernia is present. There is no hernia in the right inguinal area or left inguinal area. Genitourinary:     General: Normal vulva. Exam position: Lithotomy position. Pubic Area: No rash. Labia:         Right: No rash, tenderness, lesion or injury. Left: No rash, tenderness, lesion or injury. Uretha: No prolapse, urethral pain, urethral swelling or urethral lesion. Vagina: No signs of injury and foreign body. Vaginal discharge present. No erythema, tenderness or bleeding.       Cervix: No cervical motion tenderness, discharge, friability, lesion, erythema, cervical bleeding or eversion. Uterus: Enlarged. Not deviated, not fixed, not tender and no uterine prolapse. Adnexa:         Right: No mass, tenderness or fullness. Left: No mass, tenderness or fullness. Rectum: Normal. Guaiac result negative. No mass, tenderness, anal fissure, external hemorrhoid or internal hemorrhoid. Normal anal tone. Comments: Normal urethral meatus, nl urethra, nl bladder. Slightly enlarged uterus. Musculoskeletal: Normal range of motion. General: No tenderness. Lymphadenopathy:      Cervical: No cervical adenopathy. Lower Body: No right inguinal adenopathy. No left inguinal adenopathy. Skin:     General: Skin is warm and dry. Findings: No erythema or rash. Neurological:      General: No focal deficit present. Mental Status: She is alert and oriented to person, place, and time. Deep Tendon Reflexes: Reflexes are normal and symmetric. Psychiatric:         Mood and Affect: Mood normal.         Behavior: Behavior normal.         Thought Content: Thought content normal.         Judgment: Judgment normal.         Assessment:      1. Annual  2. Fibroids  3. perimenopaue  4. Family hx endometrial cancer  5. Vaginal itching  6. Bladder wall thickening      Plan:      1. Pap, calcium, exercise, mammogram  2-4. Discussed with patient about fibroids and hx endometrial cancer. Can consider hysterectomy. 5. Cultures, Diflucan/nystatin powder  6.  Referral to urology        Christian Cowan MD

## 2020-02-05 ENCOUNTER — TELEPHONE (OUTPATIENT)
Dept: GYNECOLOGY | Age: 55
End: 2020-02-05

## 2020-02-05 NOTE — TELEPHONE ENCOUNTER
Patient called office stating that she missed Dr. María Elena Oliver phone call. Patient stated Dr. María Elena Oliver can call her back at anytime at [phone].

## 2020-02-18 ENCOUNTER — TELEPHONE (OUTPATIENT)
Dept: INTERNAL MEDICINE CLINIC | Age: 55
End: 2020-02-18

## 2020-02-18 NOTE — TELEPHONE ENCOUNTER
Patient requesting to re-establish care with Dr. Garry Santos. She states she is a former patient. Please advise. Patient can be reached at phone number provided.

## 2020-02-18 NOTE — TELEPHONE ENCOUNTER
She does want to wait til after her birthday and she only wants it to be at [facility]

## 2020-02-18 NOTE — TELEPHONE ENCOUNTER
Patient called states that she placed an Urgent request last week and it has not been sent to her pharmacy yet. She is requesting a New Script sent to [facility] for 30 day refill of Basaglar. She noted that Dr. Chema Umaña has recently increased her dose.  She states she is completely out

## 2020-02-19 ENCOUNTER — TELEPHONE (OUTPATIENT)
Dept: PRIMARY CARE CLINIC | Age: 55
End: 2020-02-19

## 2020-02-19 RX ORDER — INSULIN GLARGINE 100 [IU]/ML
46 INJECTION, SOLUTION SUBCUTANEOUS
Qty: 5 PEN | Refills: 2 | Status: SHIPPED | OUTPATIENT
Start: 2020-02-19 | End: 2020-06-01

## 2020-02-20 NOTE — TELEPHONE ENCOUNTER
LMOM for pt that her rill has been sent to the pharmacy and that she can reach out to them to  her medication. LM to call back with any further questions.

## 2020-03-02 NOTE — FLOWSHEET NOTE
Manual Intervention      Prom/stm   15'                                                                                                   Therapeutic Exercise and NMR EXR  [x] (16791) Provided verbal/tactile cueing for activities related to strengthening, flexibility, endurance, ROM  for improvements in scapular, scapulothoracic and UE control with self care, reaching, carrying, lifting, house/yardwork, driving/computer work.    [] (75382) Provided verbal/tactile cueing for activities related to improving balance, coordination, kinesthetic sense, posture, motor skill, proprioception  to assist with  scapular, scapulothoracic and UE control with self care, reaching, carrying, lifting, house/yardwork, driving/computer work. Therapeutic Activities:    [x] (90141 or 40537) Provided verbal/tactile cueing for activities related to improving balance, coordination, kinesthetic sense, posture, motor skill, proprioception and motor activation to allow for proper function of scapular, scapulothoracic and UE control with self care, carrying, lifting, driving/computer work.      Home Exercise Program:    [x] (99864) Reviewed/Progressed HEP activities related to strengthening, flexibility, endurance, ROM of scapular, scapulothoracic and UE control with self care, reaching, carrying, lifting, house/yardwork, driving/computer work  [] (07499) Reviewed/Progressed HEP activities related to improving balance, coordination, kinesthetic sense, posture, motor skill, proprioception of scapular, scapulothoracic and UE control with self care, reaching, carrying, lifting, house/yardwork, driving/computer work      Manual Treatments:  PROM / STM / Oscillations-Mobs:  G-I, II, III, IV (PA's, Inf., Post.)  [x] (43947) Provided manual therapy to mobilize soft tissue/joints of cervical/CT, scapular GHJ and UE for the purpose of modulating pain, promoting relaxation,  increasing ROM, reducing/eliminating soft tissue
- - -

## 2020-03-03 ENCOUNTER — PREP FOR PROCEDURE (OUTPATIENT)
Dept: GYNECOLOGY | Age: 55
End: 2020-03-03

## 2020-03-03 DIAGNOSIS — Z01.818 PRE-OP EVALUATION: Primary | ICD-10-CM

## 2020-03-03 RX ORDER — SODIUM CHLORIDE 0.9 % (FLUSH) 0.9 %
10 SYRINGE (ML) INJECTION PRN
Status: CANCELLED | OUTPATIENT
Start: 2020-03-03

## 2020-03-03 RX ORDER — SODIUM CHLORIDE 0.9 % (FLUSH) 0.9 %
10 SYRINGE (ML) INJECTION EVERY 12 HOURS SCHEDULED
Status: CANCELLED | OUTPATIENT
Start: 2020-03-03

## 2020-03-03 RX ORDER — SODIUM CHLORIDE, SODIUM LACTATE, POTASSIUM CHLORIDE, CALCIUM CHLORIDE 600; 310; 30; 20 MG/100ML; MG/100ML; MG/100ML; MG/100ML
INJECTION, SOLUTION INTRAVENOUS CONTINUOUS
Status: CANCELLED | OUTPATIENT
Start: 2020-03-03

## 2020-03-04 NOTE — TELEPHONE ENCOUNTER
Patient advised surgery as follows:    4/10/20 @ 9 am @ [facility] arrival time 7 am, with 2 week post op set up at [facility] @ 4/28/20 at 340 pm.    Surgery book mailed out too DONE

## 2020-03-30 ENCOUNTER — TELEPHONE (OUTPATIENT)
Dept: GYNECOLOGY | Age: 55
End: 2020-03-30

## 2020-03-31 NOTE — TELEPHONE ENCOUNTER
Spoke with patient she wants to move it out to June, advised will let Dr. Christy Salter know and go from there, patient understood

## 2020-04-02 ENCOUNTER — TELEPHONE (OUTPATIENT)
Dept: PRIMARY CARE CLINIC | Age: 55
End: 2020-04-02

## 2020-04-02 RX ORDER — FLUTICASONE PROPIONATE 50 MCG
1 SPRAY, SUSPENSION (ML) NASAL DAILY
Qty: 1 BOTTLE | Refills: 1 | Status: SHIPPED | OUTPATIENT
Start: 2020-04-02 | End: 2020-09-30

## 2020-04-02 RX ORDER — FEXOFENADINE HCL AND PSEUDOEPHEDRINE HCI 180; 240 MG/1; MG/1
1 TABLET, EXTENDED RELEASE ORAL DAILY
Qty: 10 TABLET | Refills: 0 | Status: SHIPPED | OUTPATIENT
Start: 2020-04-02 | End: 2022-01-14 | Stop reason: SDUPTHER

## 2020-04-02 NOTE — TELEPHONE ENCOUNTER
Pt is requesting a note for work. Although she is still having sinus issues, cough runny nose and a scratchy throat. Pt would also like to be prescribed something for this. Pt would like a call back if possible.

## 2020-04-07 RX ORDER — ROSUVASTATIN CALCIUM 10 MG/1
TABLET, COATED ORAL
Qty: 30 TABLET | Refills: 8 | OUTPATIENT
Start: 2020-04-07

## 2020-04-20 ENCOUNTER — VIRTUAL VISIT (OUTPATIENT)
Dept: ENDOCRINOLOGY | Age: 55
End: 2020-04-20
Payer: COMMERCIAL

## 2020-04-20 PROCEDURE — G8427 DOCREV CUR MEDS BY ELIG CLIN: HCPCS | Performed by: INTERNAL MEDICINE

## 2020-04-20 PROCEDURE — 2022F DILAT RTA XM EVC RTNOPTHY: CPT | Performed by: INTERNAL MEDICINE

## 2020-04-20 PROCEDURE — 3052F HG A1C>EQUAL 8.0%<EQUAL 9.0%: CPT | Performed by: INTERNAL MEDICINE

## 2020-04-20 PROCEDURE — 99214 OFFICE O/P EST MOD 30 MIN: CPT | Performed by: INTERNAL MEDICINE

## 2020-04-20 PROCEDURE — 3017F COLORECTAL CA SCREEN DOC REV: CPT | Performed by: INTERNAL MEDICINE

## 2020-04-20 RX ORDER — INSULIN DEGLUDEC INJECTION 100 U/ML
INJECTION, SOLUTION SUBCUTANEOUS
Qty: 5 PEN | Refills: 2 | Status: SHIPPED | OUTPATIENT
Start: 2020-04-20 | End: 2020-05-18 | Stop reason: SDUPTHER

## 2020-04-20 RX ORDER — OMEPRAZOLE 40 MG/1
40 CAPSULE, DELAYED RELEASE ORAL DAILY
COMMUNITY
Start: 2020-04-18 | End: 2021-07-23 | Stop reason: SDUPTHER

## 2020-04-20 NOTE — PROGRESS NOTES
Final    Albumin/Globulin Ratio 09/08/2019 1.1  1.1 - 2.2 Final    Total Bilirubin 09/08/2019 0.3  0.0 - 1.0 mg/dL Final    Alkaline Phosphatase 09/08/2019 129  40 - 129 U/L Final    ALT 09/08/2019 13  10 - 40 U/L Final    AST 09/08/2019 15  15 - 37 U/L Final    Globulin 09/08/2019 3.8  g/dL Final    Lipase 09/08/2019 36.0  13.0 - 60.0 U/L Final           Assessment and Plan     Tosin was seen today for diabetes. Diagnoses and all orders for this visit:    Type 2 diabetes mellitus without complication, with long-term current use of insulin (HCC)  -     Insulin Degludec (TRESIBA FLEXTOUCH) 100 UNIT/ML SOPN; 50 units daily    Dyslipidemia associated with type 2 diabetes mellitus (HCC)    Type 2 diabetes mellitus with background retinopathy (HCC)  -     Insulin Degludec (TRESIBA FLEXTOUCH) 100 UNIT/ML SOPN; 50 units daily    Type 2 diabetes mellitus with diabetic nephropathy, with long-term current use of insulin (HCC)  -     Insulin Degludec (TRESIBA FLEXTOUCH) 100 UNIT/ML SOPN; 50 units daily    Morbid obesity due to excess calories ([facility] [address]         1: Type 2 DM complicated with nephropathy, retinopathy   Uncontrolled A1C 8.9% < 8.8% < 8.9 % < 8.7% << 9.7%     She needs to check more frequent blood sugars     Blood sugars are high in am due to late dinner. She is been counseled to move dinner early. She does not want to move dinner or cut down carbs. C/w    Metformin ER 500mg, two tabs twice a day   C/w Trulicity 6.1OC every week     Change Basaglar to Sadiq Alyssa 46 units daily in am.  If number remain high then increase to 50 units daily. I need readings three times per day (before breakfast, before dinner and at bedtime)   If this arriaga snot help, then consider adding actos. SGLT-2i is not a good option due to repeated UTI's. All instructions provided in written. Check Blood sugars 3 times per day. Log them along with insulin and send them every 2 weeks.  Call for blood sugars less than 60 or walking  Playing tennis  Stationary bicycle or elliptical indoor  Low impact aerobic exercise    Instructions given to exercise for the following duration:  30 minutes a day for five-seven days per week.     Following instructions for being active throughout the day in addition to formal exercise:  Walk instead of drive whenever possible  Take the stairs instead of the elevator  Work in the garden  Park to the far end of the parking lot to add more walking steps to destination      Electronically signed by Tatiana Valenzuela MD on 4/20/2020 at 3:52 PM

## 2020-05-06 ENCOUNTER — TELEPHONE (OUTPATIENT)
Dept: GYNECOLOGY | Age: 55
End: 2020-05-06

## 2020-05-06 ENCOUNTER — VIRTUAL VISIT (OUTPATIENT)
Dept: GYNECOLOGY | Age: 55
End: 2020-05-06
Payer: COMMERCIAL

## 2020-05-06 PROCEDURE — 99441 PR PHYS/QHP TELEPHONE EVALUATION 5-10 MIN: CPT | Performed by: OBSTETRICS & GYNECOLOGY

## 2020-05-06 RX ORDER — CIPROFLOXACIN 500 MG/1
500 TABLET, FILM COATED ORAL 2 TIMES DAILY
Qty: 14 TABLET | Refills: 0 | Status: SHIPPED | OUTPATIENT
Start: 2020-05-06 | End: 2020-05-13

## 2020-05-08 NOTE — PROGRESS NOTES
Mojgan Kebede is a 48 y.o. female evaluated via telephone on 5/6/2020. Consent:  She and/or health care decision maker is aware that that she may receive a bill for this telephone service, depending on her insurance coverage, and has provided verbal consent to proceed: Yes      Documentation:  I communicated with the patient and/or health care decision maker about dysuria. Details of this discussion including any medical advice provided:Patient feels like she has a UTI. Patient with odor and frequency. Will proceed with Cipro bid for 7 days. Check UC. I affirm this is a Patient Initiated Episode with a Patient who has not had a related appointment within my department in the past 7 days or scheduled within the next 24 hours.     Patient identification was verified at the start of the visit: Yes    Total Time: minutes: 5-10 minutes    Note: not billable if this call serves to triage the patient into an appointment for the relevant concern      Elin Landry

## 2020-05-09 LAB
ORGANISM: ABNORMAL
URINE CULTURE, ROUTINE: ABNORMAL

## 2020-05-19 RX ORDER — OMEPRAZOLE 40 MG/1
CAPSULE, DELAYED RELEASE ORAL
Qty: 30 CAPSULE | Refills: 3 | OUTPATIENT
Start: 2020-05-19

## 2020-05-21 ENCOUNTER — TELEPHONE (OUTPATIENT)
Dept: GYNECOLOGY | Age: 55
End: 2020-05-21

## 2020-06-01 ENCOUNTER — VIRTUAL VISIT (OUTPATIENT)
Dept: ENDOCRINOLOGY | Age: 55
End: 2020-06-01
Payer: COMMERCIAL

## 2020-06-01 PROCEDURE — G8427 DOCREV CUR MEDS BY ELIG CLIN: HCPCS | Performed by: INTERNAL MEDICINE

## 2020-06-01 PROCEDURE — 2022F DILAT RTA XM EVC RTNOPTHY: CPT | Performed by: INTERNAL MEDICINE

## 2020-06-01 PROCEDURE — 3052F HG A1C>EQUAL 8.0%<EQUAL 9.0%: CPT | Performed by: INTERNAL MEDICINE

## 2020-06-01 PROCEDURE — 99214 OFFICE O/P EST MOD 30 MIN: CPT | Performed by: INTERNAL MEDICINE

## 2020-06-01 PROCEDURE — 3017F COLORECTAL CA SCREEN DOC REV: CPT | Performed by: INTERNAL MEDICINE

## 2020-06-01 RX ORDER — EMPAGLIFLOZIN 25 MG/1
1 TABLET, FILM COATED ORAL DAILY
Qty: 90 TABLET | Refills: 1 | Status: SHIPPED | OUTPATIENT
Start: 2020-06-01 | End: 2020-11-10

## 2020-06-01 RX ORDER — METFORMIN HYDROCHLORIDE 500 MG/1
TABLET, EXTENDED RELEASE ORAL
Qty: 124 TABLET | Refills: 5 | Status: SHIPPED | OUTPATIENT
Start: 2020-06-01 | End: 2020-09-09 | Stop reason: SDUPTHER

## 2020-06-01 NOTE — PATIENT INSTRUCTIONS
Patient Education        Home Blood Pressure Test: About This Test  What is it? A home blood pressure test allows you to keep track of your blood pressure at home. Blood pressure is a measure of the force of blood against the walls of your arteries. Blood pressure readings include two numbers, such as 130/80 (say \"130 over 80\"). The first number is the systolic pressure. The second number is the diastolic pressure. Why is this test done? You may do this test at home to:  · Find out if you have high blood pressure. · Track your blood pressure if you have high blood pressure. · Track how well medicine is working to reduce high blood pressure. · Check how lifestyle changes, such as weight loss and exercise, are affecting blood pressure. How do you prepare for the test?  For at least 30 minutes before you take your blood pressure, don't exercise or use caffeine, tobacco, or medicines that raise blood pressure. Take your blood pressure while you feel comfortable and relaxed. Sit quietly with both feet on the floor for at least 5 minutes before the test.  How is the test done? · Sit with your arm slightly bent and resting on a table so that your upper arm is at the same level as your heart. · Roll up your sleeve or take off your shirt to expose your upper arm. · Wrap the blood pressure cuff around your upper arm so that the lower edge of the cuff is about 1 inch above the bend of your elbow. Proceed with the following steps depending on if you are using an automatic or manual pressure monitor. Automatic blood pressure monitors  · Press the on/off button on the automatic monitor and wait until the ready-to-measure \"heart\" symbol appears next to zero in the display window. · Press the start button. The cuff will inflate and deflate by itself. · Your blood pressure numbers will appear on the screen. · Write your numbers in your log book, along with the date and time.   Manual blood pressure

## 2020-06-01 NOTE — PROGRESS NOTES
Patient ID:   Alicia Simon is a 48 y.o. female    Chief Complaint:   Alicia Simon presents for an evaluation of Type 2 Diabetes Mellitus , Hyperlipidemia and hypertension. Pursuant to the emergency declaration under the [address], [address] [place] [address] and the [employer] Act this visit was conducted after obtaining verbal consent, with the use of [employer]. me interactive audio and video telecommunications system that permits real time communication between the patient and provider to substitute for an in-person clinic visit to reduce the patient's risk of exposure to COVID-19 and provide necessary medical care. Because this was a Telehealth visit, evaluation of the following organ systems was limited: Vitals, Constitutional, EENT, Resp, CV, GI, , MS, Neuro, Skin. Heme. Lymph, Imm. Tosin C Climax was at home. Provider was at home. No one else was involved. The patient has also been advised to contact this office for worsening conditions or problems, and seek emergency medical treatment and/or call 911 if deemed necessary. Subjective:   Type 2 Diabetes Mellitus diagnosed at age 30-32. On insulin since Nov 2017. Invokana caused UTI in past.     Metformin ER 500mg, two tabs twice a day    Trulicity 7.5TK weekly  (Sunday)  [place] was causing cramps. She was taking 50 units daily. Blood sugars are better since she stopped [place]. Checks blood sugars 0-3 times per day. Reviewed from scan. Morning numbers close to 200, evening numbers are down to 170's. 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 Occasional snacks (chips, pretzels). Diet pepsi. Exercise: None     Denies chest pain, exertional dyspnea. Family history of CAD: in multiple family members   Denies smoking/alcohol.    Currently on ASA 81 mg daily     The 150 mg/dL Final    HDL 01/17/2020 55  40 - 60 mg/dL Final    LDL Calculated 01/17/2020 50  <100 mg/dL Final    VLDL Cholesterol Calculated 01/17/2020 10  Not Established mg/dL Final    Hemoglobin A1C 01/17/2020 8.9  See comment % Final    eAG 01/17/2020 208.7  mg/dL Final   Office Visit on 01/16/2020   Component Date Value Ref Range Status    HPV TYPE 16 01/16/2020 Not Detected  Not Detected Final    HPV TYPE 18 01/16/2020 Not Detected  Not Detected Final    HPVOH (OTHER TYPES) 01/16/2020 Not Detected  Not Detected Final    HPV Comment 01/16/2020 See below   Final    Trichomonas Prep 01/16/2020 None Seen   Final    Yeast, Wet Prep 01/16/2020 None Seen   Final    Clue Cells, Wet Prep 01/16/2020 None Seen   Final    WBC, Wet Prep 01/16/2020 1+   Final    RBC, Wet Prep 01/16/2020 None Seen   Final    Epi Cells 01/16/2020 2+   Final    Bacteria 01/16/2020 2+   Final    Source Wet Prep 01/16/2020 Vaginal   Final    Genital Culture, Routine 01/16/2020 Normal genital microbiota isolated   Final   Hospital Outpatient Visit on 12/23/2019   Component Date Value Ref Range Status    Urine Culture, Routine 12/23/2019 <50,000 CFU/ml mixed skin/urogenital nathaniel.  No further workup   Final   Orders Only on 09/12/2019   Component Date Value Ref Range Status    Hemoglobin A1C 09/12/2019 8.8  See comment % Final    eAG 09/12/2019 205.9  mg/dL Final   Admission on 09/08/2019, Discharged on 09/08/2019   Component Date Value Ref Range Status    Color, UA 09/08/2019 Yellow  Straw/Yellow Final    Clarity, UA 09/08/2019 SL CLOUDY* Clear Final    Glucose, Ur 09/08/2019 Negative  Negative mg/dL Final    Bilirubin Urine 09/08/2019 Negative  Negative Final    Ketones, Urine 09/08/2019 Negative  Negative mg/dL Final    Specific Gravity, UA 09/08/2019 1.015  1.005 - 1.030 Final    Blood, Urine 09/08/2019 MODERATE* Negative Final    pH, UA 09/08/2019 8.0  5.0 - 8.0 Final    Protein, UA 09/08/2019 Negative  Negative normal  Pulses: normal, Vibration (128 Hz): moderately impaired     Renal screen: Normal Jan 2020    TSH screen: Nov 2017     2: HTN   Controlled at home   Sometimes check at home     3: Hyperlipidemia   LDL: 50, HDL: 55, TGs: 51 Jan 2020    Crestor 10mg, every other day. 4: Hirsutism, since 20's   On face only   Runs in the family   Gonadotropins suggest pre menopause status   Free T high 5.8 (N 0.6-3.8), TT normal 24 July 2018   Spironolactone 50mg daily, feels better. We can consider going up on spironolactone if K okay     5: Morbid obesity   Calorie target <1300 per day   Need to work on diet, exercise and lose weight   Lifestyle changes failed for meaningful weight loss   Belviq 10mg bid , was stared in May 2019,- stopped     Labs: A1C, TT, CMP now   RTC in 3 months (may add actos in a month if sugars remain high)      EDUCATION:   Greater than 50% of this follow-up visit was spent in general counseling regarding   obesity, diet, exercise, importance of adherence to insulin regime, recognition and treatment of hypo and hyperglycemia,  glucose logging, proper diabetes management, diabetic complications with poor management and the importance of glycemic control in order to avoid the complications of diabetes. Risks and potential complications of diabetes were reviewed with the patient. Diabetes health maintenance plan and follow-up were discussed and understood by the patient. We reviewed the importance of medication compliance and regular follow-up. Aggressive lifestyle modification was encouraged. Exercise Counselling: This patient is a candidate for regular physical exercise. Instructions to perform the following types of exercise:  Swimming or water aerobic exercise  Brisk walking  Playing tennis  Stationary bicycle or elliptical indoor  Low impact aerobic exercise    Instructions given to exercise for the following duration:  30 minutes a day for five-seven days per week.     Following

## 2020-06-04 RX ORDER — SPIRONOLACTONE 50 MG/1
50 TABLET, FILM COATED ORAL 2 TIMES DAILY
Qty: 60 TABLET | Refills: 5 | Status: SHIPPED | OUTPATIENT
Start: 2020-06-04 | End: 2020-12-17

## 2020-06-08 RX ORDER — ROSUVASTATIN CALCIUM 10 MG/1
TABLET, COATED ORAL
Qty: 30 TABLET | Refills: 8 | Status: SHIPPED | OUTPATIENT
Start: 2020-06-08 | End: 2021-04-08

## 2020-06-17 RX ORDER — FLUTICASONE PROPIONATE 50 MCG
SPRAY, SUSPENSION (ML) NASAL
Qty: 1 BOTTLE | Refills: 1 | OUTPATIENT
Start: 2020-06-17

## 2020-06-19 RX ORDER — FLUCONAZOLE 150 MG/1
150 TABLET ORAL
Qty: 2 TABLET | Refills: 0 | Status: SHIPPED | OUTPATIENT
Start: 2020-06-19 | End: 2020-06-25

## 2020-06-22 ENCOUNTER — OFFICE VISIT (OUTPATIENT)
Dept: PRIMARY CARE CLINIC | Age: 55
End: 2020-06-22
Payer: COMMERCIAL

## 2020-06-22 VITALS — OXYGEN SATURATION: 97 % | HEART RATE: 88 BPM | TEMPERATURE: 97.3 F

## 2020-06-22 PROCEDURE — G8417 CALC BMI ABV UP PARAM F/U: HCPCS | Performed by: INTERNAL MEDICINE

## 2020-06-22 PROCEDURE — 3017F COLORECTAL CA SCREEN DOC REV: CPT | Performed by: INTERNAL MEDICINE

## 2020-06-22 PROCEDURE — 99211 OFF/OP EST MAY X REQ PHY/QHP: CPT | Performed by: INTERNAL MEDICINE

## 2020-06-22 PROCEDURE — G8428 CUR MEDS NOT DOCUMENT: HCPCS | Performed by: INTERNAL MEDICINE

## 2020-06-22 PROCEDURE — 1036F TOBACCO NON-USER: CPT | Performed by: INTERNAL MEDICINE

## 2020-06-22 NOTE — PROGRESS NOTES
6/22/2020    FLU/COVID-19 CLINIC EVALUATION    HPI  SYMPTOMS:    Symptom duration, days:  [] 1   [] 2   [] 3   [] 4   [] 5   [] 6   [] 7   [] 8   [] 9   [] 10   [] 11   [] 12   [] 13 [] 14 +      Symptom course:   [] Worsening     [] Stable     [] Improving    [] Fevers  [] Symptom (not measured)  [] Measured (Result: )  [] Chills  [] Cough  [] Coughing up blood  [] Productive  [] Dry  [] Chest Congestion  [] Chest Tightness  [] Nasal Congestion  [] Runny Nose  [] Feeling short of breath  [x] Fatigue  [] Chest pain  [x] Headaches  []Tolerable  [] Severe  [] Sore throat  [] Muscle aches  [] Nausea  [] Decreased appetite  [] Vomiting  []Unable to keep fluids down  [] Diarrhea  []Severe    [] OTHER SYMPTOMS:      RISK FACTORS:  [] Pregnant or possibly pregnant  [] Age over 61  [] Diabetes  [] Heart disease  [] Asthma  [] COPD/Other chronic lung diseases  [] Active Cancer  [] On Chemotherapy  [] Taking oral steroids  [] History Lymphoma/Leukemia  [] Close contact with a lab confirmed COVID-19 patient within 14 days of symptom onset  [] History of travel from affected geographical areas within 14 days of symptom onset  [] Health Care Worker Exposure no symptoms  [] Health Care Worker Exposure symptomatic      VITALS:  Vitals:    06/22/20 1135   Pulse: 88   Temp: 97.3 °F (36.3 °C)   SpO2: 97%        PHYSICAL EXAMINATION:  [x]Alert   [x]Oriented to person/place/time    [x]No apparent distress     []Toxic appearing    [x]Breathing appears normal     []Appears tachypneic         [x]Speaking in full sentences     TESTS:  POCT FLU:  [] Positive     []Negative  POCT STREP:  [] Positive     []Negative    [x] COVID-19 Test sent: [x] Blue   [] Red   [] Chest X-ray     ASSESSMENT:  [] Flu  [] Strep Throat  [] Uncertain Viral Respiratory Illness  [x] Possible COVID-19  [x] Exposure COVID-19  [] Other:     PLAN:  [x] Discharge home with written instructions for:  [] Flu management  [] Strep throat management  [x] Possible COVID-19

## 2020-06-23 LAB
SARS-COV-2: NOT DETECTED
SOURCE: NORMAL

## 2020-07-10 RX ORDER — ASPIRIN 81 MG/1
TABLET, COATED ORAL
Qty: 30 TABLET | Refills: 8 | Status: SHIPPED | OUTPATIENT
Start: 2020-07-10 | End: 2021-05-11

## 2020-07-16 RX ORDER — LOSARTAN POTASSIUM 100 MG/1
TABLET ORAL
Qty: 30 TABLET | Refills: 5 | Status: SHIPPED | OUTPATIENT
Start: 2020-07-16 | End: 2021-02-09

## 2020-07-16 RX ORDER — HYDROCHLOROTHIAZIDE 25 MG/1
TABLET ORAL
Qty: 30 TABLET | Refills: 5 | Status: SHIPPED | OUTPATIENT
Start: 2020-07-16 | End: 2021-02-09

## 2020-07-16 NOTE — TELEPHONE ENCOUNTER
Requested Prescriptions     Pending Prescriptions Disp Refills    losartan (COZAAR) 100 MG tablet [Pharmacy Med Name: LOSARTAN POTASSIUM 100 MG TAB] 30 tablet 5     Sig: TAKE 1 TABLET BY MOUTH EVERY DAY    hydroCHLOROthiazide (HYDRODIURIL) 25 MG tablet [Pharmacy Med Name: HYDROCHLOROTHIAZIDE 25 MG TAB] 30 tablet 5     Sig: TAKE 1 TABLET BY MOUTH EVERY DAY       Last OV 6/1/20  Next OV 9/14/20

## 2020-09-08 PROBLEM — M75.22 BICEPS TENDINITIS ON LEFT: Status: RESOLVED | Noted: 2017-06-19 | Resolved: 2020-09-08

## 2020-09-08 PROBLEM — M77.8 CAPSULITIS OF LEFT SHOULDER: Status: RESOLVED | Noted: 2017-06-19 | Resolved: 2020-09-08

## 2020-09-08 PROBLEM — S46.219A RUPTURE OF BICEPS TENDON: Status: RESOLVED | Noted: 2017-06-19 | Resolved: 2020-09-08

## 2020-09-08 PROBLEM — M25.512 ACUTE PAIN OF LEFT SHOULDER: Status: RESOLVED | Noted: 2017-06-19 | Resolved: 2020-09-08

## 2020-09-08 PROBLEM — M75.122 COMPLETE TEAR OF LEFT ROTATOR CUFF: Status: RESOLVED | Noted: 2017-06-19 | Resolved: 2020-09-08

## 2020-09-09 ENCOUNTER — OFFICE VISIT (OUTPATIENT)
Dept: INTERNAL MEDICINE CLINIC | Age: 55
End: 2020-09-09
Payer: COMMERCIAL

## 2020-09-09 VITALS
DIASTOLIC BLOOD PRESSURE: 70 MMHG | SYSTOLIC BLOOD PRESSURE: 112 MMHG | HEART RATE: 108 BPM | HEIGHT: 67 IN | OXYGEN SATURATION: 96 % | TEMPERATURE: 97 F | RESPIRATION RATE: 14 BRPM | BODY MASS INDEX: 37.98 KG/M2 | WEIGHT: 242 LBS

## 2020-09-09 PROCEDURE — 90471 IMMUNIZATION ADMIN: CPT | Performed by: INTERNAL MEDICINE

## 2020-09-09 PROCEDURE — 2022F DILAT RTA XM EVC RTNOPTHY: CPT | Performed by: INTERNAL MEDICINE

## 2020-09-09 PROCEDURE — 90746 HEPB VACCINE 3 DOSE ADULT IM: CPT | Performed by: INTERNAL MEDICINE

## 2020-09-09 PROCEDURE — G8417 CALC BMI ABV UP PARAM F/U: HCPCS | Performed by: INTERNAL MEDICINE

## 2020-09-09 PROCEDURE — 3046F HEMOGLOBIN A1C LEVEL >9.0%: CPT | Performed by: INTERNAL MEDICINE

## 2020-09-09 PROCEDURE — 1036F TOBACCO NON-USER: CPT | Performed by: INTERNAL MEDICINE

## 2020-09-09 PROCEDURE — G8427 DOCREV CUR MEDS BY ELIG CLIN: HCPCS | Performed by: INTERNAL MEDICINE

## 2020-09-09 PROCEDURE — 3017F COLORECTAL CA SCREEN DOC REV: CPT | Performed by: INTERNAL MEDICINE

## 2020-09-09 PROCEDURE — 99215 OFFICE O/P EST HI 40 MIN: CPT | Performed by: INTERNAL MEDICINE

## 2020-09-09 ASSESSMENT — PATIENT HEALTH QUESTIONNAIRE - PHQ9
1. LITTLE INTEREST OR PLEASURE IN DOING THINGS: 0
SUM OF ALL RESPONSES TO PHQ QUESTIONS 1-9: 0
2. FEELING DOWN, DEPRESSED OR HOPELESS: 0
SUM OF ALL RESPONSES TO PHQ QUESTIONS 1-9: 0
SUM OF ALL RESPONSES TO PHQ9 QUESTIONS 1 & 2: 0

## 2020-09-09 NOTE — PROGRESS NOTES
[facility]  Internal Medicine Progress Note  Ceasar Avery MD  9/9/2020    Loly Pa  YOB: 1965    Medical Assistant Triage:     Chief Complaint   Patient presents with   Yanet Trujillo Doctor       HPI: 48 y.o. female here today to establish care and assess status of chronic medical problems. Says she saw me over 20 years ago, when she was  31. Has recently been seeing Dr. Radha Julien and an endocrinologist, Dr. Jaun Mora in same office. Diabetes since early 30's. Diabetes has been poorly controlled for some time. Was on \"pens\" before and \"never worked before. Lost weight and walking and not able to get sugars under control until finally added Jardiance in April. Sugars are always high, at 200. Down from high 200s though. After lunch, 130 if walks, and 150-200 if doesn't. 90 day high 397, and low 97, 30 day numbers better. Down about 12 # since December. Has appointment to see Dr. Jaun Mora next week. Ongoing constipation issues. Stool softener and magnesium. Often only once weekly. Thinks has used miralax but not sure how used it. Linzess hurt stomach. Amitiza worked but insurance stopped covering    HTN:recent start to meds. GERD: take omeprazole works well. Has HAO. Say is mild  but review of chart shows moderate, and desat to 77%. energy is good now.      Past Medical History:   Diagnosis Date    Arthritis of left acromioclavicular joint 6/19/2017    Complete tear of left rotator cuff 6/19/2017    Essential hypertension 4/16/2018    Fibroid     Fibroids     uterine    Rupture of biceps tendon 6/19/2017    Type II or unspecified type diabetes mellitus without mention of complication, not stated as uncontrolled     Urogenital trichomoniasis        Past Surgical History:   Procedure Laterality Date    COLONOSCOPY  5/23/16    incomplete, Dr Juaquin Chaudhari         Family Hx:      Problem Relation Age of Onset    Diabetes Mother     High empagliflozin (JARDIANCE) 25 MG tablet Take 1 tablet by mouth daily 90 tablet 1    metFORMIN (GLUCOPHAGE) 500 MG tablet Take 2 tablets by mouth 2 times daily (with meals) 124 tablet 5    Insulin Degludec (TRESIBA FLEXTOUCH) 100 UNIT/ML SOPN 50 units daily 5 pen 0    TRULICITY 1.5 OU/9.8LD SOPN INJECT 1 PEN INTO THE SKIN ONCE A WEEK 4 pen 5    omeprazole (PRILOSEC) 40 MG delayed release capsule       fexofenadine-pseudoephedrine (ALLEGRA-D 24HR) 180-240 MG per extended release tablet Take 1 tablet by mouth daily 10 tablet 0    fluticasone (FLONASE) 50 MCG/ACT nasal spray 1 spray by Each Nostril route daily 1 Bottle 1    nystatin (MYCOSTATIN) 636587 UNIT/GM powder Apply topically 2 times daily 1 Bottle 2    ondansetron (ZOFRAN ODT) 4 MG disintegrating tablet Take 1 tablet by mouth every 8 hours as needed for Nausea or Vomiting May Sub regular tablet (non-ODT) if insurance does not cover ODT. 12 tablet 1    pregabalin (LYRICA) 25 MG capsule Take 1 capsule by mouth 2 times daily for 30 days. (Patient taking differently: Take 25 mg by mouth 2 times daily as needed. ) 60 capsule 1    docusate sodium (COLACE) 100 MG capsule TAKE 1 CAPSULE BY MOUTH 2 TIMES DAILY AS NEEDED FOR CONSTIPATION 50 capsule 1    celecoxib (CELEBREX) 200 MG capsule TAKE 1 CAPSULE BY MOUTH EVERY DAY (Patient taking differently: TAKE 1 CAPSULE BY MOUTH AS NEEDED) 30 capsule 2    B-D ULTRAFINE III SHORT PEN 31G X 8 MM MISC USE AS DIRECTED TO INJECT INSULIN EVERY DAY  3     No current facility-administered medications for this visit. Physical Exam  Vitals:    09/09/20 0931   BP: 112/70   Pulse: 108   Resp: 14   Temp: 97 °F (36.1 °C)   SpO2: 96%   Weight: 242 lb (109.8 kg)   Height: 5' 7\" (1.702 m)     Body mass index is 37.9 kg/m². Physical Exam  Constitutional:       Appearance: Normal appearance. Neck:      Musculoskeletal: Normal range of motion. Cardiovascular:      Rate and Rhythm: Normal rate and regular rhythm. Pulses:           Dorsalis pedis pulses are 2+ on the right side and 1+ on the left side. Posterior tibial pulses are 2+ on the right side and 2+ on the left side. Pulmonary:      Effort: Pulmonary effort is normal.      Breath sounds: Normal breath sounds. Musculoskeletal:      Right lower leg: No edema. Left lower leg: No edema. Lymphadenopathy:      Cervical: No cervical adenopathy. Skin:     Comments: Feet with no skin lesions or callous   Neurological:      Mental Status: She is alert. Comments: Monofilament exam:  Normal sensation bilaterally   Psychiatric:         Mood and Affect: Mood normal.       ASSESSMENT/PLAN:  Type 2 diabetes mellitus with diabetic nephropathy, with long-term current use of insulin ([address]  Poorly controlled to date, although home blood sugars indicate some improvement. Will get labs for patient prior to her endocrine appointment next week. -     Hemoglobin A1C; Future  -     Comprehensive Metabolic Panel; Future  -     HM DIABETES FOOT EXAM    Anemia, unspecified type  Etiology unknown Does not appear to have been previously evaluated. Recheck cbc with iron, b12 and folate  -     Iron and TIBC; Future  -     Ferritin; Future  -     Vitamin B12 & Folate; Future  -     CBC Auto Differential; Future    Chronic constipation  Has done well with amitiza. See if can get covered again. If not, encourage her to try miralax again on a daily basis. Obstructive sleep apnea  Mod, with nocturnal hypoxia. Has lost some weight since then. Advised of associated health risks with HAO and encourage her to f/u with Dr. Karena Hatchet hypertension  At goal.  Continue current meds. Check renal function and electrolytes. Hep B #1    Return in about 6 months (around 3/9/2021) for CPE. Reviewed records from last PCP, endocrinology, labs in past year. Spent 45 minutes with patient; >50% was with counseling and coordination of care.     Lenin Marr MD    This note was generated completely or in part utilizing Dragon dictation speech recognition software. Occasionally, words are mistranscribed and despite editing, the text may contain inaccuracies due to incorrect word recognition.   If further clarification is needed please contact the office at [phone]

## 2020-09-14 ENCOUNTER — OFFICE VISIT (OUTPATIENT)
Dept: ENDOCRINOLOGY | Age: 55
End: 2020-09-14
Payer: COMMERCIAL

## 2020-09-14 VITALS
HEIGHT: 67 IN | TEMPERATURE: 95.6 F | HEART RATE: 92 BPM | SYSTOLIC BLOOD PRESSURE: 111 MMHG | DIASTOLIC BLOOD PRESSURE: 70 MMHG | WEIGHT: 242 LBS | OXYGEN SATURATION: 96 % | BODY MASS INDEX: 37.98 KG/M2

## 2020-09-14 PROCEDURE — G8427 DOCREV CUR MEDS BY ELIG CLIN: HCPCS | Performed by: INTERNAL MEDICINE

## 2020-09-14 PROCEDURE — 3017F COLORECTAL CA SCREEN DOC REV: CPT | Performed by: INTERNAL MEDICINE

## 2020-09-14 PROCEDURE — 2022F DILAT RTA XM EVC RTNOPTHY: CPT | Performed by: INTERNAL MEDICINE

## 2020-09-14 PROCEDURE — 3046F HEMOGLOBIN A1C LEVEL >9.0%: CPT | Performed by: INTERNAL MEDICINE

## 2020-09-14 PROCEDURE — G8417 CALC BMI ABV UP PARAM F/U: HCPCS | Performed by: INTERNAL MEDICINE

## 2020-09-14 PROCEDURE — 99214 OFFICE O/P EST MOD 30 MIN: CPT | Performed by: INTERNAL MEDICINE

## 2020-09-14 PROCEDURE — 1036F TOBACCO NON-USER: CPT | Performed by: INTERNAL MEDICINE

## 2020-09-14 NOTE — PROGRESS NOTES
Patient ID:   Tj Brown is a 48 y.o. female    Chief Complaint:   Tj Brown presents for an evaluation of Type 2 Diabetes Mellitus , Hyperlipidemia and hypertension. Subjective:   Type 2 Diabetes Mellitus diagnosed at age 30-32. On insulin since Nov 2017. Invokana caused UTI in past.     Algis Cuna was causing cramps. She was taking 50 units daily. Blood sugars are better since she stopped Algis Cuna. In the past she tried three different basal insulins and her blood sugars get higher. Metformin ER 500mg, two tabs twice a day    Trulicity 4.6IT weekly  (Sunday)  Jardiance 25mg daily     Checks blood sugars 0.4 times per day. Reviewed from meter, 126-207, 397. Average 163  AM:     Lunch:   Supper:   HS:       Hypoglycemias: none     Meals: Three, dinner is late 7-9 pm as she prepares for him. Occasional snacks (chips, pretzels). Diet pepsi. Exercise: None     Denies chest pain, exertional dyspnea. Family history of CAD: in multiple family members   Denies smoking/alcohol.    Currently on ASA 81 mg daily     The following portions of the patient's history were reviewed and updated as appropriate:       Family History   Problem Relation Age of Onset    Diabetes Mother     High Blood Pressure Mother     High Cholesterol Mother     Uterine Cancer Mother     High Blood Pressure Father     Heart Disease Sister     Diabetes Maternal Aunt     Stroke Maternal Aunt     Heart Disease Maternal Uncle     Cancer Maternal Uncle     Cancer Maternal Grandmother     Diabetes Maternal Grandfather     Diabetes Maternal Aunt     Stroke Maternal Aunt     Diabetes Maternal Aunt     Stroke Maternal Aunt     Heart Disease Maternal Aunt     No Known Problems Brother     No Known Problems Paternal Aunt     No Known Problems Paternal Uncle     No Known Problems Paternal Grandmother     No Known Problems Paternal Grandfather     No Known Problems Other     Rheum Arthritis Neg Hx     times daily, Disp: 1 Bottle, Rfl: 2    ondansetron (ZOFRAN ODT) 4 MG disintegrating tablet, Take 1 tablet by mouth every 8 hours as needed for Nausea or Vomiting May Sub regular tablet (non-ODT) if insurance does not cover ODT., Disp: 12 tablet, Rfl: 1    docusate sodium (COLACE) 100 MG capsule, TAKE 1 CAPSULE BY MOUTH 2 TIMES DAILY AS NEEDED FOR CONSTIPATION, Disp: 50 capsule, Rfl: 1    celecoxib (CELEBREX) 200 MG capsule, TAKE 1 CAPSULE BY MOUTH EVERY DAY (Patient taking differently: TAKE 1 CAPSULE BY MOUTH AS NEEDED), Disp: 30 capsule, Rfl: 2    B-D ULTRAFINE III SHORT PEN 31G X 8 MM MISC, USE AS DIRECTED TO INJECT INSULIN EVERY DAY, Disp: , Rfl: 3    pregabalin (LYRICA) 25 MG capsule, Take 1 capsule by mouth 2 times daily for 30 days. (Patient taking differently: Take 25 mg by mouth 2 times daily as needed. ), Disp: 60 capsule, Rfl: 1      Review of Systems:    Constitutional: Negative for fever, chills, and unexpected weight change. HENT: Negative for congestion, ear pain, rhinorrhea,  sore throat and trouble swallowing. Eyes: Negative for photophobia, redness, itching. Respiratory: Negative for cough, shortness of breath and sputum. Cardiovascular: Negative for chest pain, palpitations and leg swelling. Gastrointestinal: Negative for nausea, vomiting, abdominal pain, diarrhea, constipation. Endocrine: Negative for cold intolerance, heat intolerance, polydipsia, polyphagia and polyuria. Genitourinary: Negative for dysuria, urgency, frequency, hematuria and flank pain. Musculoskeletal: Negative for myalgias, back pain, arthralgias and neck pain. Skin/Nail: Negative for rash, itching. Normal nails. Neurological: Negative for seizures, weakness, light-headedness, numbness and headaches. Hematological/ Lymph nodes: Negative for adenopathy. Does not bruise/bleed easily.    Psychiatric/Behavioral: Negative for suicidal ideas, depression, anxiety, sleep disturbance and decreased concentration. Objective:   Physical Exam:  /70 (Site: Left Upper Arm, Position: Sitting, Cuff Size: Large Adult)   Pulse 92   Temp 95.6 °F (35.3 °C) (Temporal)   Ht 5' 7\" (1.702 m)   Wt 242 lb (109.8 kg)   LMP 07/14/2020   SpO2 96%   Breastfeeding No   BMI 37.90 kg/m²     Constitutional: Patient is oriented to person, place, and time. Patient appears well-developed and well-nourished. HENT:               Head: Normocephalic and atraumatic. Eyes: Conjunctivae and EOM are normal.                 Neck: Normal range of motion. Cardiovascular: Normal rate, regular rhythm and normal heart sounds. Pulmonary/Chest: Effort normal and breath sounds normal.   Abdominal: Soft. Bowel sounds are normal.   Musculoskeletal: Normal range of motion. Neurological: Patient is alert and oriented to person, place, and time. Patient has normal reflexes. Skin: Skin is warm and dry. Psychiatric: Patient has a normal mood and affect.  Patient behavior is normal.     Lab Review:    Orders Only on 07/22/2020   Component Date Value Ref Range Status    Visual Acuity Distance Right Eye 07/22/2020 20/100   Final    Visual Acuity Distance Left Eye 07/22/2020 20/50   Final    Intraocular Pressure Eye 07/22/2020    Final                    Value:14  14      Diabetic Retinopathy 07/22/2020 POSITIVE - MONITOR   Final    Cataracts 07/22/2020 POSITIVE   Final    Glaucoma 07/22/2020 NEGATIVE   Final   Office Visit on 06/22/2020   Component Date Value Ref Range Status    SARS-CoV-2 06/22/2020 Not Detected  Not Detected Final    Source 06/22/2020 OP swab   Final   Orders Only on 06/02/2020   Component Date Value Ref Range Status    Testosterone 06/02/2020 16* 20 - 70 ng/dL Final    Sex Hormone Binding 06/02/2020 17* 30 - 135 nmol/L Final    Testosterone, Free 06/02/2020 4.0* 0.6 - 3.8 pg/mL Final    Hemoglobin A1C 06/02/2020 9.4  See comment % Final    eAG 06/02/2020 223.1  mg/dL Final    Sodium 06/02/2020 135* 136 - 145 mmol/L Final    Potassium 06/02/2020 4.7  3.5 - 5.1 mmol/L Final    Chloride 06/02/2020 96* 99 - 110 mmol/L Final    CO2 06/02/2020 23  21 - 32 mmol/L Final    Anion Gap 06/02/2020 16  3 - 16 Final    Glucose 06/02/2020 215* 70 - 99 mg/dL Final    BUN 06/02/2020 17  7 - 20 mg/dL Final    CREATININE 06/02/2020 0.9  0.6 - 1.1 mg/dL Final    GFR Non- 06/02/2020 >60  >60 Final    GFR  06/02/2020 >60  >60 Final    Calcium 06/02/2020 9.7  8.3 - 10.6 mg/dL Final    Total Protein 06/02/2020 7.4  6.4 - 8.2 g/dL Final    Alb 06/02/2020 4.4  3.4 - 5.0 g/dL Final    Albumin/Globulin Ratio 06/02/2020 1.5  1.1 - 2.2 Final    Total Bilirubin 06/02/2020 0.3  0.0 - 1.0 mg/dL Final    Alkaline Phosphatase 06/02/2020 148* 40 - 129 U/L Final    ALT 06/02/2020 16  10 - 40 U/L Final    AST 06/02/2020 14* 15 - 37 U/L Final    Globulin 06/02/2020 3.0  g/dL Final   Virtual Visit on 05/06/2020   Component Date Value Ref Range Status    Organism 05/06/2020 Escherichia coli*  Final    Urine Culture, Routine 05/06/2020 >100,000 CFU/ml   Final   Orders Only on 01/17/2020   Component Date Value Ref Range Status    Microalbumin, Random Urine 01/17/2020 <1.20  <2.0 mg/dL Final    Creatinine, Ur 01/17/2020 151.6  28.0 - 259.0 mg/dL Final    Microalbumin Creatinine Ratio 01/17/2020 see below  0.0 - 30.0 mg/g Final    Cholesterol, Fasting 01/17/2020 115  0 - 199 mg/dL Final    Triglyceride, Fasting 01/17/2020 51  0 - 150 mg/dL Final    HDL 01/17/2020 55  40 - 60 mg/dL Final    LDL Calculated 01/17/2020 50  <100 mg/dL Final    VLDL Cholesterol Calculated 01/17/2020 10  Not Established mg/dL Final    Hemoglobin A1C 01/17/2020 8.9  See comment % Final    eAG 01/17/2020 208.7  mg/dL Final   Office Visit on 01/16/2020   Component Date Value Ref Range Status    HPV TYPE 16 01/16/2020 Not Detected  Not Detected Final    HPV TYPE 18 01/16/2020 Not Detected  Not Detected Final    HPVOH (OTHER TYPES) 01/16/2020 Not Detected  Not Detected Final    HPV Comment 01/16/2020 See below   Final    Trichomonas Prep 01/16/2020 None Seen   Final    Yeast, Wet Prep 01/16/2020 None Seen   Final    Clue Cells, Wet Prep 01/16/2020 None Seen   Final    WBC, Wet Prep 01/16/2020 1+   Final    RBC, Wet Prep 01/16/2020 None Seen   Final    Epi Cells 01/16/2020 2+   Final    Bacteria 01/16/2020 2+   Final    Source Wet Prep 01/16/2020 Vaginal   Final    Genital Culture, Routine 01/16/2020 Normal genital microbiota isolated   Final   Hospital Outpatient Visit on 12/23/2019   Component Date Value Ref Range Status    Urine Culture, Routine 12/23/2019 <50,000 CFU/ml mixed skin/urogenital nathaniel. No further workup   Final           Assessment and Plan     Tosin was seen today for diabetes. Diagnoses and all orders for this visit:    Type 2 diabetes mellitus with other specified complication, with long-term current use of insulin ([facility] [address]    Type 2 diabetes mellitus without complication, with long-term current use of insulin ([facility])  -     metFORMIN (GLUCOPHAGE) 500 MG tablet; Take 2 tablets by mouth 2 times daily (with meals)    Type 2 diabetes mellitus with background retinopathy ([facility])  -     metFORMIN (GLUCOPHAGE) 500 MG tablet;  Take 2 tablets by mouth 2 times daily (with meals)    Essential hypertension    Type 2 diabetes mellitus with diabetic nephropathy, with long-term current use of insulin ([facility])    Morbid obesity due to excess calories ([address]    Morbid obesity with BMI of 40.0-44.9, adult ([address]    Dyslipidemia associated with type 2 diabetes mellitus ([address]         1: Type 2 DM complicated with nephropathy, retinopathy   Uncontrolled A1C 9.4% < 8.9% < 8.8% < 8.9 % < 8.7% < 9.7%     She needs to check more frequent blood sugars     C/w    Metformin ER 500mg, two tabs twice a day   Jardiance 02BX daily   C/w Trulicity 6.0YH every week     I may add actos if blood sugars remain high   Keep insulins off since she does not want to do it. I need readings three times per day (before breakfast, before dinner and at bedtime)   If this arriaga snot help, then consider adding actos. All instructions provided in written. Check Blood sugars 3 times per day. Log them along with insulin and send them every 2 weeks. Call for blood sugars less than 60 or more than 400. Eye exam: Last exam in Aug 2019, DR bradshaw. She will need surgery for cataracts. Foot exam:  Sep 2020. dystrophic nail with fungal infection of tos, extending to skin on left foot   Deformity/amputation: absent  Skin lesions/pre-ulcerative calluses: absent  Edema: right- negative, left- negative  Sensory exam: Monofilament sensation: normal  Pulses: normal, Vibration (128 Hz): moderately impaired     Renal screen: Normal Jan 2020    TSH screen: Nov 2017     2: HTN   Controlled at home   Sometimes check at home     3: Hyperlipidemia   LDL: 50, HDL: 55, TGs: 51 Jan 2020    Crestor 10mg, every other day. 4: Hirsutism, since 20's   On face only   Runs in the family   Gonadotropins suggest pre menopause status   Free T high 5.8 (N 0.6-3.8), TT normal 24 July 2018   Free T 4.0 high June 2020. Spironolactone 50mg twice daily, missing doses. Okay to take 100mg daily     Keep same dose till 2021 to see benefit, it will be 6 months from change.      5: Morbid obesity   Calorie target <1300 per day   Lost 10 lbs   Need to work on diet, exercise and lose weight   Lifestyle changes failed for meaningful weight loss   Belviq 10mg bid , was stared in May 2019,- stopped due to recall     Labs: A1C, CMP now (ordered by PCP)   RTC in 3 months (may add actos in a month if sugars remain high or A1C 8%)       EDUCATION:   Greater than 50% of this follow-up visit was spent in general counseling regarding   obesity, diet, exercise, importance of adherence to insulin regime, recognition and treatment of hypo and hyperglycemia,  glucose logging, proper diabetes management, diabetic complications with poor management and the importance of glycemic control in order to avoid the complications of diabetes. Risks and potential complications of diabetes were reviewed with the patient. Diabetes health maintenance plan and follow-up were discussed and understood by the patient. We reviewed the importance of medication compliance and regular follow-up. Aggressive lifestyle modification was encouraged. Exercise Counselling: This patient is a candidate for regular physical exercise. Instructions to perform the following types of exercise:  Swimming or water aerobic exercise  Brisk walking  Playing tennis  Stationary bicycle or elliptical indoor  Low impact aerobic exercise    Instructions given to exercise for the following duration:  30 minutes a day for five-seven days per week.     Following instructions for being active throughout the day in addition to formal exercise:  Walk instead of drive whenever possible  Take the stairs instead of the elevator  Work in the garden  Park to the far end of the parking lot to add more walking steps to destination      Electronically signed by Elizabeth Alfonso MD on 9/14/2020 at 3:13 PM

## 2020-09-14 NOTE — PATIENT INSTRUCTIONS

## 2020-09-18 ENCOUNTER — TELEPHONE (OUTPATIENT)
Dept: ENDOCRINOLOGY | Age: 55
End: 2020-09-18

## 2020-09-18 NOTE — TELEPHONE ENCOUNTER
Pt requests script for yeast infection med, the OTC brands are not working.  [facility] pharmacy [place]

## 2020-09-21 RX ORDER — FLUCONAZOLE 150 MG/1
150 TABLET ORAL
Qty: 2 TABLET | Refills: 0 | Status: SHIPPED | OUTPATIENT
Start: 2020-09-21 | End: 2020-09-27

## 2020-09-21 NOTE — TELEPHONE ENCOUNTER
Mrs. Robin Quiñones informed Dr. Ole Kenny sent RX for Fluconazole sent, one pill now and then repeat in 3 days . none

## 2020-09-30 RX ORDER — FLUTICASONE PROPIONATE 50 MCG
SPRAY, SUSPENSION (ML) NASAL
Qty: 1 BOTTLE | Refills: 5 | Status: SHIPPED | OUTPATIENT
Start: 2020-09-30 | End: 2022-01-14 | Stop reason: SDUPTHER

## 2020-11-05 RX ORDER — METFORMIN HYDROCHLORIDE 500 MG/1
TABLET, EXTENDED RELEASE ORAL
Qty: 124 TABLET | Refills: 5 | OUTPATIENT
Start: 2020-11-05

## 2020-11-10 RX ORDER — EMPAGLIFLOZIN 25 MG/1
TABLET, FILM COATED ORAL
Qty: 30 TABLET | Refills: 5 | Status: SHIPPED | OUTPATIENT
Start: 2020-11-10 | End: 2021-05-10

## 2020-11-18 DIAGNOSIS — D64.9 ANEMIA, UNSPECIFIED TYPE: ICD-10-CM

## 2020-11-18 DIAGNOSIS — Z01.818 PRE-OP EVALUATION: ICD-10-CM

## 2020-11-18 DIAGNOSIS — Z79.4 TYPE 2 DIABETES MELLITUS WITH DIABETIC NEPHROPATHY, WITH LONG-TERM CURRENT USE OF INSULIN (HCC): ICD-10-CM

## 2020-11-18 DIAGNOSIS — E11.3299 TYPE 2 DIABETES MELLITUS WITH BACKGROUND RETINOPATHY (HCC): ICD-10-CM

## 2020-11-18 DIAGNOSIS — E11.21 TYPE 2 DIABETES MELLITUS WITH DIABETIC NEPHROPATHY, WITH LONG-TERM CURRENT USE OF INSULIN (HCC): ICD-10-CM

## 2020-11-18 LAB
A/G RATIO: 1.5 (ref 1.1–2.2)
ALBUMIN SERPL-MCNC: 4.3 G/DL (ref 3.4–5)
ALP BLD-CCNC: 145 U/L (ref 40–129)
ALT SERPL-CCNC: 11 U/L (ref 10–40)
ANION GAP SERPL CALCULATED.3IONS-SCNC: 15 MMOL/L (ref 3–16)
AST SERPL-CCNC: 11 U/L (ref 15–37)
BASOPHILS ABSOLUTE: 0.1 K/UL (ref 0–0.2)
BASOPHILS RELATIVE PERCENT: 0.9 %
BILIRUB SERPL-MCNC: <0.2 MG/DL (ref 0–1)
BUN BLDV-MCNC: 27 MG/DL (ref 7–20)
CALCIUM SERPL-MCNC: 9.6 MG/DL (ref 8.3–10.6)
CHLORIDE BLD-SCNC: 99 MMOL/L (ref 99–110)
CO2: 22 MMOL/L (ref 21–32)
CREAT SERPL-MCNC: 0.8 MG/DL (ref 0.6–1.1)
EOSINOPHILS ABSOLUTE: 0.6 K/UL (ref 0–0.6)
EOSINOPHILS RELATIVE PERCENT: 7.3 %
FERRITIN: 13.3 NG/ML (ref 15–150)
FOLATE: 6.09 NG/ML (ref 4.78–24.2)
GFR AFRICAN AMERICAN: >60
GFR NON-AFRICAN AMERICAN: >60
GLOBULIN: 2.9 G/DL
GLUCOSE BLD-MCNC: 138 MG/DL (ref 70–99)
GONADOTROPIN, CHORIONIC (HCG) QUANT: <5 MIU/ML
HCT VFR BLD CALC: 33.2 % (ref 36–48)
HEMOGLOBIN: 10.5 G/DL (ref 12–16)
IRON SATURATION: 11 % (ref 15–50)
IRON: 49 UG/DL (ref 37–145)
LYMPHOCYTES ABSOLUTE: 1.6 K/UL (ref 1–5.1)
LYMPHOCYTES RELATIVE PERCENT: 20.3 %
MCH RBC QN AUTO: 24.4 PG (ref 26–34)
MCHC RBC AUTO-ENTMCNC: 31.7 G/DL (ref 31–36)
MCV RBC AUTO: 76.8 FL (ref 80–100)
MONOCYTES ABSOLUTE: 0.4 K/UL (ref 0–1.3)
MONOCYTES RELATIVE PERCENT: 5.2 %
NEUTROPHILS ABSOLUTE: 5.1 K/UL (ref 1.7–7.7)
NEUTROPHILS RELATIVE PERCENT: 66.3 %
PDW BLD-RTO: 17.9 % (ref 12.4–15.4)
PLATELET # BLD: 347 K/UL (ref 135–450)
PMV BLD AUTO: 9 FL (ref 5–10.5)
POTASSIUM SERPL-SCNC: 5.2 MMOL/L (ref 3.5–5.1)
RBC # BLD: 4.33 M/UL (ref 4–5.2)
SODIUM BLD-SCNC: 136 MMOL/L (ref 136–145)
TOTAL IRON BINDING CAPACITY: 454 UG/DL (ref 260–445)
TOTAL PROTEIN: 7.2 G/DL (ref 6.4–8.2)
VITAMIN B-12: 573 PG/ML (ref 211–911)
WBC # BLD: 7.7 K/UL (ref 4–11)

## 2020-11-19 LAB
ESTIMATED AVERAGE GLUCOSE: 185.8 MG/DL
HBA1C MFR BLD: 8.1 %

## 2020-11-22 PROBLEM — D50.9 IRON DEFICIENCY ANEMIA: Status: ACTIVE | Noted: 2020-11-22

## 2020-11-25 ENCOUNTER — TELEPHONE (OUTPATIENT)
Dept: ENDOCRINOLOGY | Age: 55
End: 2020-11-25

## 2020-11-25 RX ORDER — METFORMIN HYDROCHLORIDE 500 MG/1
TABLET, EXTENDED RELEASE ORAL
Qty: 124 TABLET | Refills: 5 | OUTPATIENT
Start: 2020-11-25

## 2020-11-25 NOTE — TELEPHONE ENCOUNTER
Ig the patient is tolerating regular metformin then keep it   If she is having nausea, abdominal discomfort or diarrhea then change metformin to ER

## 2020-11-25 NOTE — TELEPHONE ENCOUNTER
Spoke with patient and she states that the tolerates both forms with no problems. Therefore, I called pharmacy to inform to keep current dose form.

## 2020-11-25 NOTE — TELEPHONE ENCOUNTER
Per [facility] pharmacy patient had previously been on Metformin ER but the ER version was recalled and rx for the originally was sent to pharmacy on 9/14/2020. Metformin ER is now available. Would you like patient to remain on Metformin or restart Metformin ER.       Please advise

## 2021-01-25 ENCOUNTER — VIRTUAL VISIT (OUTPATIENT)
Dept: ENDOCRINOLOGY | Age: 56
End: 2021-01-25
Payer: COMMERCIAL

## 2021-01-25 DIAGNOSIS — E11.3299 TYPE 2 DIABETES MELLITUS WITH BACKGROUND RETINOPATHY (HCC): ICD-10-CM

## 2021-01-25 DIAGNOSIS — L68.0 HIRSUTISM: ICD-10-CM

## 2021-01-25 DIAGNOSIS — E11.69 DYSLIPIDEMIA ASSOCIATED WITH TYPE 2 DIABETES MELLITUS (HCC): ICD-10-CM

## 2021-01-25 DIAGNOSIS — R25.2 LEG CRAMPS: ICD-10-CM

## 2021-01-25 DIAGNOSIS — E66.01 MORBID OBESITY DUE TO EXCESS CALORIES (HCC): ICD-10-CM

## 2021-01-25 DIAGNOSIS — B37.49 CANDIDA UTI: Primary | ICD-10-CM

## 2021-01-25 DIAGNOSIS — E78.5 DYSLIPIDEMIA ASSOCIATED WITH TYPE 2 DIABETES MELLITUS (HCC): ICD-10-CM

## 2021-01-25 DIAGNOSIS — E11.69 TYPE 2 DIABETES MELLITUS WITH OTHER SPECIFIED COMPLICATION, WITH LONG-TERM CURRENT USE OF INSULIN (HCC): ICD-10-CM

## 2021-01-25 DIAGNOSIS — E11.21 TYPE 2 DIABETES MELLITUS WITH DIABETIC NEPHROPATHY, WITH LONG-TERM CURRENT USE OF INSULIN (HCC): ICD-10-CM

## 2021-01-25 DIAGNOSIS — E11.9 TYPE 2 DIABETES MELLITUS WITHOUT COMPLICATION, WITH LONG-TERM CURRENT USE OF INSULIN (HCC): ICD-10-CM

## 2021-01-25 DIAGNOSIS — Z79.4 TYPE 2 DIABETES MELLITUS WITH OTHER SPECIFIED COMPLICATION, WITH LONG-TERM CURRENT USE OF INSULIN (HCC): ICD-10-CM

## 2021-01-25 DIAGNOSIS — E55.9 VITAMIN D DEFICIENCY: ICD-10-CM

## 2021-01-25 DIAGNOSIS — Z79.4 TYPE 2 DIABETES MELLITUS WITHOUT COMPLICATION, WITH LONG-TERM CURRENT USE OF INSULIN (HCC): ICD-10-CM

## 2021-01-25 DIAGNOSIS — Z79.4 TYPE 2 DIABETES MELLITUS WITH DIABETIC NEPHROPATHY, WITH LONG-TERM CURRENT USE OF INSULIN (HCC): ICD-10-CM

## 2021-01-25 PROCEDURE — 3017F COLORECTAL CA SCREEN DOC REV: CPT | Performed by: INTERNAL MEDICINE

## 2021-01-25 PROCEDURE — 99215 OFFICE O/P EST HI 40 MIN: CPT | Performed by: INTERNAL MEDICINE

## 2021-01-25 PROCEDURE — 3046F HEMOGLOBIN A1C LEVEL >9.0%: CPT | Performed by: INTERNAL MEDICINE

## 2021-01-25 PROCEDURE — G8427 DOCREV CUR MEDS BY ELIG CLIN: HCPCS | Performed by: INTERNAL MEDICINE

## 2021-01-25 PROCEDURE — 2022F DILAT RTA XM EVC RTNOPTHY: CPT | Performed by: INTERNAL MEDICINE

## 2021-01-25 RX ORDER — FLUCONAZOLE 150 MG/1
150 TABLET ORAL DAILY
Qty: 3 TABLET | Refills: 0 | Status: SHIPPED | OUTPATIENT
Start: 2021-01-25 | End: 2021-01-28

## 2021-01-25 RX ORDER — PIOGLITAZONEHYDROCHLORIDE 15 MG/1
15 TABLET ORAL DAILY
Qty: 90 TABLET | Refills: 1 | Status: SHIPPED | OUTPATIENT
Start: 2021-01-25 | End: 2021-07-07

## 2021-01-25 NOTE — PROGRESS NOTES
BS Readings: Average 163    1/24  AM- 155  PM- 123  BEDTIME- 163    1/23  AM- 149  PM- 178  BEDTIME-162    1/22  AM- 196  PM- 206  BEDTIME- 166    1/21  AM- 150  PM- 125  BEDTIME- 187    1/20  AM- 205  PM- 187  BEDTIME- 121

## 2021-01-25 NOTE — PROGRESS NOTES
Supper:   HS:       Hypoglycemias: none     Meals: Three, dinner is late 7-9 pm as she prepares for him. Occasional snacks (chips, pretzels). Diet pepsi. Exercise: Walk around the work. Denies chest pain, exertional dyspnea. Family history of CAD: in multiple family members   Denies smoking/alcohol.    Currently on ASA 81 mg daily     The following portions of the patient's history were reviewed and updated as appropriate:       Family History   Problem Relation Age of Onset    Diabetes Mother     High Blood Pressure Mother     High Cholesterol Mother     Uterine Cancer Mother     High Blood Pressure Father     Other Father         cardiac amyloidosis    Heart Disease Sister     Diabetes Maternal Aunt     Stroke Maternal Aunt     Heart Disease Maternal Uncle     Cancer Maternal Uncle     Cancer Maternal Grandmother     Diabetes Maternal Grandfather     Diabetes Maternal Aunt     Stroke Maternal Aunt     Diabetes Maternal Aunt     Stroke Maternal Aunt     Heart Disease Maternal Aunt     No Known Problems Brother     No Known Problems Paternal Aunt     No Known Problems Paternal Uncle     No Known Problems Paternal Grandmother     No Known Problems Paternal Grandfather     No Known Problems Other     Rheum Arthritis Neg Hx     Osteoarthritis Neg Hx     Asthma Neg Hx     Breast Cancer Neg Hx     Heart Failure Neg Hx     Hypertension Neg Hx     Migraines Neg Hx     Ovarian Cancer Neg Hx     Rashes/Skin Problems Neg Hx     Seizures Neg Hx     Thyroid Disease Neg Hx          Social History     Socioeconomic History    Marital status: Single     Spouse name: Not on file    Number of children: Not on file    Years of education: Not on file    Highest education level: Not on file   Occupational History    Occupation:  for [employer]   Social Needs    Financial resource strain: Not on file    Food insecurity     Worry: Not on file     Inability: pioglitazone (ACTOS) 15 MG tablet, Take 1 tablet by mouth daily, Disp: 90 tablet, Rfl: 1    fluconazole (DIFLUCAN) 150 MG tablet, Take 1 tablet by mouth daily for 3 days, Disp: 3 tablet, Rfl: 0    spironolactone (ALDACTONE) 50 MG tablet, TAKE 1 TABLET BY MOUTH TWICE A DAY, Disp: 60 tablet, Rfl: 1    JARDIANCE 25 MG tablet, TAKE 1 TABLET BY MOUTH EVERY DAY, Disp: 30 tablet, Rfl: 5    TRULICITY 1.5 PZ/1.3PJ SOPN, INJECT 1 PEN INTO THE SKIN ONCE A WEEK, Disp: 12 pen, Rfl: 1    fluticasone (FLONASE) 50 MCG/ACT nasal spray, SPRAY 1 SPRAY INTO EACH NOSTRIL EVERY DAY, Disp: 1 Bottle, Rfl: 5    metFORMIN (GLUCOPHAGE) 500 MG tablet, Take 2 tablets by mouth 2 times daily (with meals), Disp: 124 tablet, Rfl: 5    losartan (COZAAR) 100 MG tablet, TAKE 1 TABLET BY MOUTH EVERY DAY, Disp: 30 tablet, Rfl: 5    hydroCHLOROthiazide (HYDRODIURIL) 25 MG tablet, TAKE 1 TABLET BY MOUTH EVERY DAY, Disp: 30 tablet, Rfl: 5    ASPIRIN LOW DOSE 81 MG EC tablet, TAKE 1 TABLET BY MOUTH EVERY DAY, Disp: 30 tablet, Rfl: 8    rosuvastatin (CRESTOR) 10 MG tablet, TAKE 1 TABLET BY MOUTH EVERY DAY IN THE EVENING (Patient taking differently: Take 10 mg by mouth every other day ), Disp: 30 tablet, Rfl: 8    MAGNESIUM CITRATE PO, Take by mouth OTC, Disp: , Rfl:     omeprazole (PRILOSEC) 40 MG delayed release capsule, Take 40 mg by mouth daily , Disp: , Rfl:     fexofenadine-pseudoephedrine (ALLEGRA-D 24HR) 180-240 MG per extended release tablet, Take 1 tablet by mouth daily (Patient taking differently: Take 1 tablet by mouth as needed ), Disp: 10 tablet, Rfl: 0    nystatin (MYCOSTATIN) 772423 UNIT/GM powder, Apply topically 2 times daily, Disp: 1 Bottle, Rfl: 2    docusate sodium (COLACE) 100 MG capsule, TAKE 1 CAPSULE BY MOUTH 2 TIMES DAILY AS NEEDED FOR CONSTIPATION, Disp: 50 capsule, Rfl: 1    celecoxib (CELEBREX) 200 MG capsule, TAKE 1 CAPSULE BY MOUTH EVERY DAY (Patient taking differently: TAKE 1 CAPSULE BY MOUTH AS NEEDED), Disp: 30 capsule, Rfl: 2    B-D ULTRAFINE III SHORT PEN 31G X 8 MM MISC, USE AS DIRECTED TO INJECT INSULIN EVERY DAY, Disp: , Rfl: 3    pregabalin (LYRICA) 25 MG capsule, Take 1 capsule by mouth 2 times daily for 30 days. (Patient taking differently: Take 25 mg by mouth 2 times daily as needed. ), Disp: 60 capsule, Rfl: 1      Review of Systems:    Constitutional: Negative for fever, chills, and unexpected weight change. HENT: Negative for congestion, ear pain, rhinorrhea,  sore throat and trouble swallowing. Eyes: Negative for photophobia, redness, itching. Respiratory: Negative for cough, shortness of breath and sputum. Cardiovascular: Negative for chest pain, palpitations and leg swelling. Gastrointestinal: Negative for nausea, vomiting, abdominal pain, diarrhea, constipation. Endocrine: Negative for cold intolerance, heat intolerance, polydipsia, polyphagia and polyuria. Genitourinary: Negative for dysuria, urgency, frequency, hematuria and flank pain. Musculoskeletal: Negative for myalgias, back pain, arthralgias and neck pain. Skin/Nail: Negative for rash, itching. Normal nails. Neurological: Negative for seizures, weakness, light-headedness, numbness and headaches. Hematological/ Lymph nodes: Negative for adenopathy. Does not bruise/bleed easily. Psychiatric/Behavioral: Negative for suicidal ideas, depression, anxiety, sleep disturbance and decreased concentration. Objective:   Physical Exam:  There were no vitals taken for this visit. Constitutional: Patient is oriented to person, place, and time. Patient appears well-developed and well-nourished. Pulmonary/Chest: Effort normal.   Neurological: Patient is alert and oriented to person, place, and time. Psychiatric: Patient has a normal mood and affect.  Patient behavior is normal.     Lab Review:    Orders Only on 11/18/2020   Component Date Value Ref Range Status    Hemoglobin A1C 11/18/2020 8.1  See comment % Final    eAG 11/18/2020 185.8  mg/dL Final    Sodium 11/18/2020 136  136 - 145 mmol/L Final    Potassium 11/18/2020 5.2* 3.5 - 5.1 mmol/L Final    Chloride 11/18/2020 99  99 - 110 mmol/L Final    CO2 11/18/2020 22  21 - 32 mmol/L Final    Anion Gap 11/18/2020 15  3 - 16 Final    Glucose 11/18/2020 138* 70 - 99 mg/dL Final    BUN 11/18/2020 27* 7 - 20 mg/dL Final    CREATININE 11/18/2020 0.8  0.6 - 1.1 mg/dL Final    GFR Non- 11/18/2020 >60  >60 Final    GFR  11/18/2020 >60  >60 Final    Calcium 11/18/2020 9.6  8.3 - 10.6 mg/dL Final    Total Protein 11/18/2020 7.2  6.4 - 8.2 g/dL Final    Alb 11/18/2020 4.3  3.4 - 5.0 g/dL Final    Albumin/Globulin Ratio 11/18/2020 1.5  1.1 - 2.2 Final    Total Bilirubin 11/18/2020 <0.2  0.0 - 1.0 mg/dL Final    Alkaline Phosphatase 11/18/2020 145* 40 - 129 U/L Final    ALT 11/18/2020 11  10 - 40 U/L Final    AST 11/18/2020 11* 15 - 37 U/L Final    Globulin 11/18/2020 2.9  g/dL Final    Iron 11/18/2020 49  37 - 145 ug/dL Final    TIBC 11/18/2020 454* 260 - 445 ug/dL Final    Iron Saturation 11/18/2020 11* 15 - 50 % Final    Ferritin 11/18/2020 13.3* 15.0 - 150.0 ng/mL Final    Vitamin B-12 11/18/2020 573  211 - 911 pg/mL Final    Folate 11/18/2020 6.09  4.78 - 24.20 ng/mL Final    WBC 11/18/2020 7.7  4.0 - 11.0 K/uL Final    RBC 11/18/2020 4.33  4.00 - 5.20 M/uL Final    Hemoglobin 11/18/2020 10.5* 12.0 - 16.0 g/dL Final    Hematocrit 11/18/2020 33.2* 36.0 - 48.0 % Final    MCV 11/18/2020 76.8* 80.0 - 100.0 fL Final    MCH 11/18/2020 24.4* 26.0 - 34.0 pg Final    MCHC 11/18/2020 31.7  31.0 - 36.0 g/dL Final    RDW 11/18/2020 17.9* 12.4 - 15.4 % Final    Platelets 10/82/5265 347  135 - 450 K/uL Final    MPV 11/18/2020 9.0  5.0 - 10.5 fL Final    Neutrophils % 11/18/2020 66.3  % Final    Lymphocytes % 11/18/2020 20.3  % Final    Monocytes % 11/18/2020 5.2  % Final    Eosinophils % 11/18/2020 7.3  % Final    Basophils % 11/18/2020 0.9  % Final    Neutrophils Absolute 11/18/2020 5.1  1.7 - 7.7 K/uL Final    Lymphocytes Absolute 11/18/2020 1.6  1.0 - 5.1 K/uL Final    Monocytes Absolute 11/18/2020 0.4  0.0 - 1.3 K/uL Final    Eosinophils Absolute 11/18/2020 0.6  0.0 - 0.6 K/uL Final    Basophils Absolute 11/18/2020 0.1  0.0 - 0.2 K/uL Final    hCG Quant 11/18/2020 <5.0  <5.0 mIU/mL Final   Orders Only on 07/22/2020   Component Date Value Ref Range Status    Visual Acuity Distance Right Eye 07/22/2020 20/100   Final    Visual Acuity Distance Left Eye 07/22/2020 20/50   Final    Intraocular Pressure Eye 07/22/2020    Final                    Value:14  14      Diabetic Retinopathy 07/22/2020 POSITIVE - MONITOR   Final    Cataracts 07/22/2020 POSITIVE   Final    Glaucoma 07/22/2020 NEGATIVE   Final   Office Visit on 06/22/2020   Component Date Value Ref Range Status    SARS-CoV-2 06/22/2020 Not Detected  Not Detected Final    Source 06/22/2020 OP swab   Final   Orders Only on 06/02/2020   Component Date Value Ref Range Status    Testosterone 06/02/2020 16* 20 - 70 ng/dL Final    Sex Hormone Binding 06/02/2020 17* 30 - 135 nmol/L Final    Testosterone, Free 06/02/2020 4.0* 0.6 - 3.8 pg/mL Final    Hemoglobin A1C 06/02/2020 9.4  See comment % Final    eAG 06/02/2020 223.1  mg/dL Final    Sodium 06/02/2020 135* 136 - 145 mmol/L Final    Potassium 06/02/2020 4.7  3.5 - 5.1 mmol/L Final    Chloride 06/02/2020 96* 99 - 110 mmol/L Final    CO2 06/02/2020 23  21 - 32 mmol/L Final    Anion Gap 06/02/2020 16  3 - 16 Final    Glucose 06/02/2020 215* 70 - 99 mg/dL Final    BUN 06/02/2020 17  7 - 20 mg/dL Final    CREATININE 06/02/2020 0.9  0.6 - 1.1 mg/dL Final    GFR Non- 06/02/2020 >60  >60 Final    GFR  06/02/2020 >60  >60 Final    Calcium 06/02/2020 9.7  8.3 - 10.6 mg/dL Final    Total Protein 06/02/2020 7.4  6.4 - 8.2 g/dL Final    Alb 06/02/2020 4.4  3.4 - 5.0 g/dL Final    Albumin/Globulin Ratio 06/02/2020 1.5  1.1 - 2.2 Final    Total Bilirubin 06/02/2020 0.3  0.0 - 1.0 mg/dL Final    Alkaline Phosphatase 06/02/2020 148* 40 - 129 U/L Final    ALT 06/02/2020 16  10 - 40 U/L Final    AST 06/02/2020 14* 15 - 37 U/L Final    Globulin 06/02/2020 3.0  g/dL Final   Virtual Visit on 05/06/2020   Component Date Value Ref Range Status    Organism 05/06/2020 Escherichia coli*  Final    Urine Culture, Routine 05/06/2020 >100,000 CFU/ml   Final           Assessment and Plan     Tosin was seen today for diabetes. Diagnoses and all orders for this visit:    Candida UTI  -     Magnesium; Future  -     fluconazole (DIFLUCAN) 150 MG tablet; Take 1 tablet by mouth daily for 3 days  -     Fructosamine; Future  -     HEMOGLOBINOPATHY EVALUATION; Future  -     Hemoglobin A1C; Future  -     Lipid, Fasting; Future  -     Comprehensive Metabolic Panel; Future  -     Microalbumin / Creatinine Urine Ratio; Future    Type 2 diabetes mellitus with diabetic nephropathy, with long-term current use of insulin ([facility])  -     pioglitazone (ACTOS) 15 MG tablet; Take 1 tablet by mouth daily  -     Magnesium; Future  -     Fructosamine; Future  -     HEMOGLOBINOPATHY EVALUATION; Future  -     Hemoglobin A1C; Future  -     Lipid, Fasting; Future  -     Comprehensive Metabolic Panel; Future  -     Microalbumin / Creatinine Urine Ratio; Future    Hirsutism  -     Magnesium; Future  -     Fructosamine; Future  -     HEMOGLOBINOPATHY EVALUATION; Future  -     Hemoglobin A1C; Future  -     Lipid, Fasting; Future  -     Comprehensive Metabolic Panel; Future  -     Microalbumin / Creatinine Urine Ratio; Future    Type 2 diabetes mellitus without complication, with long-term current use of insulin ([facility])  -     pioglitazone (ACTOS) 15 MG tablet; Take 1 tablet by mouth daily  -     Magnesium; Future  -     Fructosamine; Future  -     HEMOGLOBINOPATHY EVALUATION;  Future  -     Hemoglobin A1C; Future  -     Lipid, Fasting; Future  -     Comprehensive Metabolic Panel; Future  -     Microalbumin / Creatinine Urine Ratio; Future    Type 2 diabetes mellitus with background retinopathy ([facility])  -     pioglitazone (ACTOS) 15 MG tablet; Take 1 tablet by mouth daily  -     Magnesium; Future  -     Fructosamine; Future  -     HEMOGLOBINOPATHY EVALUATION; Future  -     Hemoglobin A1C; Future  -     Lipid, Fasting; Future  -     Comprehensive Metabolic Panel; Future  -     Microalbumin / Creatinine Urine Ratio; Future    Type 2 diabetes mellitus with other specified complication, with long-term current use of insulin ([facility])  -     Magnesium; Future  -     Fructosamine; Future  -     HEMOGLOBINOPATHY EVALUATION; Future  -     Hemoglobin A1C; Future  -     Lipid, Fasting; Future  -     Comprehensive Metabolic Panel; Future  -     Microalbumin / Creatinine Urine Ratio; Future    Morbid obesity due to excess calories (HCC)  -     Magnesium; Future  -     Fructosamine; Future  -     HEMOGLOBINOPATHY EVALUATION; Future  -     Hemoglobin A1C; Future  -     Lipid, Fasting; Future  -     Comprehensive Metabolic Panel; Future  -     Microalbumin / Creatinine Urine Ratio; Future    Dyslipidemia associated with type 2 diabetes mellitus (HCC)  -     Magnesium; Future  -     Fructosamine; Future  -     HEMOGLOBINOPATHY EVALUATION; Future  -     Hemoglobin A1C; Future  -     Lipid, Fasting; Future  -     Comprehensive Metabolic Panel; Future  -     Microalbumin / Creatinine Urine Ratio; Future    Leg cramps  -     Magnesium; Future  -     Fructosamine; Future  -     HEMOGLOBINOPATHY EVALUATION; Future  -     Hemoglobin A1C; Future  -     Lipid, Fasting; Future  -     Comprehensive Metabolic Panel; Future  -     Microalbumin / Creatinine Urine Ratio; Future    Vitamin D deficiency  -     Vitamin D 25 Hydroxy; Future  -     Magnesium; Future  -     Fructosamine; Future  -     HEMOGLOBINOPATHY EVALUATION;  Future  -     Hemoglobin A1C; Future  -     Lipid, Fasting; Future  -     Comprehensive Metabolic Panel; Future  -     Microalbumin / Creatinine Urine Ratio; Future         1: Type 2 DM complicated with nephropathy, retinopathy   Uncontrolled A1C 8.1% < 9.4% < 8.9% < 8.8% < 8.9 % < 8.7% < 9.7%     A1C is unreliable with iron deficiency anemia   Sister and mother also have anemia   Will do Fructosamine and HB electrophoresis     She needs to check more frequent blood sugars     Add actos 15 mg daily . S/e discussed     C/w    Metformin ER 500mg, two tabs twice a day   Jardiance 25mg daily. She is still getting yeast infection. Will send fluconazole to use as needed. C/w Trulicity 9.0JE every week     I need readings three times per day (before breakfast, before dinner and at bedtime)   If this arriaga snot help, then consider adding actos. All instructions provided in written. Check Blood sugars 3 times per day. Log them along with insulin and send them every 2 weeks. Call for blood sugars less than 60 or more than 400. Eye exam: Last exam in July 2020. DR bradshaw, stable. Osiel Angry She will need surgery for cataracts. Foot exam:  Sep 2020. dystrophic nail with fungal infection of tos, extending to skin on left foot   Deformity/amputation: absent  Skin lesions/pre-ulcerative calluses: absent  Edema: right- negative, left- negative  Sensory exam: Monofilament sensation: normal  Pulses: normal, Vibration (128 Hz): moderately impaired     Renal screen: Normal Jan 2020    TSH screen: Nov 2017     2: HTN   Controlled at home   Sometimes check at home     3: Hyperlipidemia   LDL: 50, HDL: 55, TGs: 51 Jan 2020    Crestor 10mg, every other day. Leg Cramps: Check Vit D and Mag   Iron deficiency can cause leg cramps as well     4: Hirsutism, since 20's   On face only   Runs in the family   Gonadotropins suggest pre menopause status   Free T high 5.8 (N 0.6-3.8), TT normal 24 July 2018   Free T 4.0 high June 2020.    Spironolactone 50mg twice daily, this dose is helping on facial hair. Keep same dose till 2021 to see benefit, it will be 6 months from change. 5: Morbid obesity   Calorie target <1300 per day   Lost another 8 lbs    Need to work on diet, exercise and lose weight   Lifestyle changes failed for meaningful weight loss   Belviq 10mg bid , was stared in May 2019,- stopped due to recall     Labs before next visit: A1C, CMP, MACR, Mag, vit D, lipids    RTC in 2 months      EDUCATION:   Greater than 50% of this follow-up visit was spent in general counseling regarding   obesity, diet, exercise, importance of adherence to insulin regime, recognition and treatment of hypo and hyperglycemia,  glucose logging, proper diabetes management, diabetic complications with poor management and the importance of glycemic control in order to avoid the complications of diabetes. Risks and potential complications of diabetes were reviewed with the patient. Diabetes health maintenance plan and follow-up were discussed and understood by the patient. We reviewed the importance of medication compliance and regular follow-up. Aggressive lifestyle modification was encouraged. Exercise Counselling: This patient is a candidate for regular physical exercise. Instructions to perform the following types of exercise:  Swimming or water aerobic exercise  Brisk walking  Playing tennis  Stationary bicycle or elliptical indoor  Low impact aerobic exercise    Instructions given to exercise for the following duration:  30 minutes a day for five-seven days per week.     Following instructions for being active throughout the day in addition to formal exercise:  Walk instead of drive whenever possible  Take the stairs instead of the elevator  Work in the garden  Park to the far end of the parking lot to add more walking steps to destination      Electronically signed by Alcario Dakins, MD on 1/25/2021 at 4:17 PM

## 2021-03-08 PROBLEM — K59.09 CHRONIC CONSTIPATION: Status: ACTIVE | Noted: 2021-03-08

## 2021-03-09 ENCOUNTER — OFFICE VISIT (OUTPATIENT)
Dept: INTERNAL MEDICINE CLINIC | Age: 56
End: 2021-03-09
Payer: COMMERCIAL

## 2021-03-09 VITALS
WEIGHT: 236 LBS | TEMPERATURE: 98.1 F | BODY MASS INDEX: 36.96 KG/M2 | HEART RATE: 96 BPM | DIASTOLIC BLOOD PRESSURE: 58 MMHG | SYSTOLIC BLOOD PRESSURE: 98 MMHG

## 2021-03-09 DIAGNOSIS — I10 ESSENTIAL HYPERTENSION: Primary | ICD-10-CM

## 2021-03-09 DIAGNOSIS — K59.09 CHRONIC CONSTIPATION: ICD-10-CM

## 2021-03-09 DIAGNOSIS — G47.33 OBSTRUCTIVE SLEEP APNEA: ICD-10-CM

## 2021-03-09 DIAGNOSIS — Z79.4 TYPE 2 DIABETES MELLITUS WITH DIABETIC NEPHROPATHY, WITH LONG-TERM CURRENT USE OF INSULIN (HCC): ICD-10-CM

## 2021-03-09 DIAGNOSIS — F51.04 CHRONIC INSOMNIA: ICD-10-CM

## 2021-03-09 DIAGNOSIS — E11.21 TYPE 2 DIABETES MELLITUS WITH DIABETIC NEPHROPATHY, WITH LONG-TERM CURRENT USE OF INSULIN (HCC): ICD-10-CM

## 2021-03-09 DIAGNOSIS — M79.2 NEUROPATHIC PAIN: ICD-10-CM

## 2021-03-09 PROCEDURE — G8417 CALC BMI ABV UP PARAM F/U: HCPCS | Performed by: INTERNAL MEDICINE

## 2021-03-09 PROCEDURE — G8484 FLU IMMUNIZE NO ADMIN: HCPCS | Performed by: INTERNAL MEDICINE

## 2021-03-09 PROCEDURE — 3046F HEMOGLOBIN A1C LEVEL >9.0%: CPT | Performed by: INTERNAL MEDICINE

## 2021-03-09 PROCEDURE — 3017F COLORECTAL CA SCREEN DOC REV: CPT | Performed by: INTERNAL MEDICINE

## 2021-03-09 PROCEDURE — 1036F TOBACCO NON-USER: CPT | Performed by: INTERNAL MEDICINE

## 2021-03-09 PROCEDURE — G8427 DOCREV CUR MEDS BY ELIG CLIN: HCPCS | Performed by: INTERNAL MEDICINE

## 2021-03-09 PROCEDURE — 99214 OFFICE O/P EST MOD 30 MIN: CPT | Performed by: INTERNAL MEDICINE

## 2021-03-09 PROCEDURE — 2022F DILAT RTA XM EVC RTNOPTHY: CPT | Performed by: INTERNAL MEDICINE

## 2021-03-09 ASSESSMENT — PATIENT HEALTH QUESTIONNAIRE - PHQ9
SUM OF ALL RESPONSES TO PHQ QUESTIONS 1-9: 0
SUM OF ALL RESPONSES TO PHQ9 QUESTIONS 1 & 2: 0
SUM OF ALL RESPONSES TO PHQ QUESTIONS 1-9: 0
1. LITTLE INTEREST OR PLEASURE IN DOING THINGS: 0

## 2021-03-09 NOTE — ASSESSMENT & PLAN NOTE
Has not returned to sleep medicine. At this point feels unable to consider CPAP again. Did discuss oral devices. Currently doing well with weight loss. Perhaps reconsider sleep study when gets to a plateau with her weight.

## 2021-03-09 NOTE — PATIENT INSTRUCTIONS
[employer] recommendations:   1. Go to bed at the same time each night and rise at the same time each morning. 2. Make sure your bedroom is a quiet, dark, and relaxing environment, which is neither too hot or too cold. 3. Make sure your bed is comfortable and use it only for sleeping and not for other activities, such as reading, watching TV, or listening to music. Remove all TVs, computers, and other gadgets from the bedroom. 4. Physical activity may help promote sleep, but not within a few hours of bedtime. 5. Avoid large meals before bedtime.

## 2021-03-09 NOTE — ASSESSMENT & PLAN NOTE
Still symptomatic. Advised patient to start using MiraLAX on a regular basis rather than as needed, but to titrate her daily dose to where she has regular bowel movements.

## 2021-03-09 NOTE — ASSESSMENT & PLAN NOTE
Reviewed sleep hygiene. Reviewed proper timing of melatonin. Interested in working with Dr. Butt for the insomnia. Believes she has tried trazodone in the past although were unable to find proof of this.

## 2021-03-29 ENCOUNTER — TELEPHONE (OUTPATIENT)
Dept: GYNECOLOGY | Age: 56
End: 2021-03-29

## 2021-03-29 DIAGNOSIS — Z86.018 HISTORY OF UTERINE FIBROID: Primary | ICD-10-CM

## 2021-03-29 NOTE — TELEPHONE ENCOUNTER
Patient has upcoming appt on 4/8/2021. Patient states that she usually gets an [address] before for her fibr iod. Wants to know if she should have one done before the 8th.  Please contact patient at  [phone]

## 2021-04-01 ENCOUNTER — HOSPITAL ENCOUNTER (OUTPATIENT)
Dept: ULTRASOUND IMAGING | Age: 56
Discharge: HOME | End: 2021-04-01
Payer: COMMERCIAL

## 2021-04-01 DIAGNOSIS — Z86.018 HISTORY OF UTERINE FIBROID: ICD-10-CM

## 2021-04-01 PROCEDURE — 76856 US EXAM PELVIC COMPLETE: CPT

## 2021-04-01 PROCEDURE — 76830 TRANSVAGINAL US NON-OB: CPT

## 2021-04-06 DIAGNOSIS — L68.0 HIRSUTISM: ICD-10-CM

## 2021-04-06 RX ORDER — SPIRONOLACTONE 50 MG/1
TABLET, FILM COATED ORAL
Qty: 60 TABLET | Refills: 1 | Status: SHIPPED | OUTPATIENT
Start: 2021-04-06 | End: 2021-08-30 | Stop reason: SDUPTHER

## 2021-04-08 ENCOUNTER — OFFICE VISIT (OUTPATIENT)
Dept: GYNECOLOGY | Age: 56
End: 2021-04-08
Payer: COMMERCIAL

## 2021-04-08 VITALS
HEIGHT: 67 IN | TEMPERATURE: 97.1 F | DIASTOLIC BLOOD PRESSURE: 71 MMHG | SYSTOLIC BLOOD PRESSURE: 123 MMHG | HEART RATE: 88 BPM | BODY MASS INDEX: 37.35 KG/M2 | WEIGHT: 238 LBS | RESPIRATION RATE: 17 BRPM

## 2021-04-08 DIAGNOSIS — E11.9 TYPE 2 DIABETES MELLITUS WITHOUT COMPLICATION (HCC): ICD-10-CM

## 2021-04-08 DIAGNOSIS — Z79.4 TYPE 2 DIABETES MELLITUS WITH DIABETIC NEPHROPATHY, WITH LONG-TERM CURRENT USE OF INSULIN (HCC): ICD-10-CM

## 2021-04-08 DIAGNOSIS — L68.0 HIRSUTISM: ICD-10-CM

## 2021-04-08 DIAGNOSIS — Z01.419 WELL WOMAN EXAM WITH ROUTINE GYNECOLOGICAL EXAM: Primary | ICD-10-CM

## 2021-04-08 DIAGNOSIS — E11.21 TYPE 2 DIABETES MELLITUS WITH DIABETIC NEPHROPATHY, WITH LONG-TERM CURRENT USE OF INSULIN (HCC): ICD-10-CM

## 2021-04-08 DIAGNOSIS — R35.0 URINARY FREQUENCY: ICD-10-CM

## 2021-04-08 PROCEDURE — 99396 PREV VISIT EST AGE 40-64: CPT | Performed by: OBSTETRICS & GYNECOLOGY

## 2021-04-08 RX ORDER — ROSUVASTATIN CALCIUM 10 MG/1
TABLET, COATED ORAL
Qty: 30 TABLET | Refills: 8 | Status: SHIPPED | OUTPATIENT
Start: 2021-04-08 | End: 2022-01-06

## 2021-04-09 LAB — URINE CULTURE, ROUTINE: NORMAL

## 2021-04-09 ASSESSMENT — ENCOUNTER SYMPTOMS
GASTROINTESTINAL NEGATIVE: 1
EYES NEGATIVE: 1
RESPIRATORY NEGATIVE: 1

## 2021-04-10 NOTE — PROGRESS NOTES
Subjective:      Patient ID: Dane Carroll is a 55 y.o. female. Patient is here for annual. Having cycles every 3 months. Patient overall feeling OK. Questions about fibroids. Patient with some urinary frequency    Gynecologic Exam    Urinary Tract Infection        Review of Systems   Constitutional: Negative. HENT: Negative. Eyes: Negative. Respiratory: Negative. Cardiovascular: Negative. Gastrointestinal: Negative. Genitourinary: Positive for frequency. Musculoskeletal: Negative. Skin: Negative. Neurological: Negative. Psychiatric/Behavioral: Negative.       Date of Birth 1965  Past Medical History:   Diagnosis Date    Arthritis of left acromioclavicular joint 2017    Complete tear of left rotator cuff 2017    Essential hypertension 2018    Fibroid     Fibroids     uterine    Iron deficiency anemia 2020    Normal colonoscopy, mennorhagia    Iron deficiency anemia 2020    Rupture of biceps tendon 2017    Type II or unspecified type diabetes mellitus without mention of complication, not stated as uncontrolled     Urogenital trichomoniasis      Past Surgical History:   Procedure Laterality Date    COLONOSCOPY  16    incomplete, Dr Elizabeth Weaver History    Para Term  AB Living   0 0 0 0 0 0   SAB TAB Ectopic Molar Multiple Live Births   0 0 0   0       Social History     Socioeconomic History    Marital status: Single     Spouse name: Not on file    Number of children: Not on file    Years of education: Not on file    Highest education level: Not on file   Occupational History    Occupation:  for [employer] Financial resource strain: Not on file    Food insecurity     Worry: Not on file     Inability: Not on file    Transportation needs     Medical: Not on file     Non-medical: Not on file   Tobacco Use    Smoking status: Former 5    ASPIRIN LOW DOSE 81 MG EC tablet TAKE 1 TABLET BY MOUTH EVERY DAY 30 tablet 8    MAGNESIUM CITRATE PO Take by mouth OTC      omeprazole (PRILOSEC) 40 MG delayed release capsule Take 40 mg by mouth daily       fexofenadine-pseudoephedrine (ALLEGRA-D 24HR) 180-240 MG per extended release tablet Take 1 tablet by mouth daily (Patient taking differently: Take 1 tablet by mouth as needed ) 10 tablet 0    nystatin (MYCOSTATIN) 414266 UNIT/GM powder Apply topically 2 times daily 1 Bottle 2    docusate sodium (COLACE) 100 MG capsule TAKE 1 CAPSULE BY MOUTH 2 TIMES DAILY AS NEEDED FOR CONSTIPATION 50 capsule 1     Family History   Problem Relation Age of Onset    Diabetes Mother     High Blood Pressure Mother     High Cholesterol Mother     Uterine Cancer Mother     High Blood Pressure Father     Other Father         cardiac amyloidosis    Heart Disease Sister     Diabetes Maternal Aunt     Stroke Maternal Aunt     Heart Disease Maternal Uncle     Cancer Maternal Uncle     Cancer Maternal Grandmother     Diabetes Maternal Grandfather     Diabetes Maternal Aunt     Stroke Maternal Aunt     Diabetes Maternal Aunt     Stroke Maternal Aunt     Heart Disease Maternal Aunt     No Known Problems Brother     No Known Problems Paternal Aunt     No Known Problems Paternal Uncle     No Known Problems Paternal Grandmother     No Known Problems Paternal Grandfather     No Known Problems Other     Rheum Arthritis Neg Hx     Osteoarthritis Neg Hx     Asthma Neg Hx     Breast Cancer Neg Hx     Heart Failure Neg Hx     Hypertension Neg Hx     Migraines Neg Hx     Ovarian Cancer Neg Hx     Rashes/Skin Problems Neg Hx     Seizures Neg Hx     Thyroid Disease Neg Hx      /71 (Site: Right Upper Arm, Position: Sitting, Cuff Size: Large Adult)   Pulse 88   Temp 97.1 °F (36.2 °C)   Resp 17   Ht 5' 7\" (1.702 m)   Wt 238 lb (108 kg)   LMP 02/08/2021   BMI 37.28 kg/m²       Objective:   Physical Exam  Constitutional:       General: She is not in acute distress. Appearance: Normal appearance. She is well-developed and normal weight. She is not diaphoretic. HENT:      Head: Normocephalic and atraumatic. Nose: Nose normal.      Mouth/Throat:      Mouth: Mucous membranes are moist.      Pharynx: Oropharynx is clear. Eyes:      Pupils: Pupils are equal, round, and reactive to light. Neck:      Musculoskeletal: Normal range of motion and neck supple. No neck rigidity. Thyroid: No thyromegaly. Cardiovascular:      Rate and Rhythm: Normal rate and regular rhythm. Pulses: Normal pulses. Heart sounds: Normal heart sounds. No murmur. No friction rub. No gallop. Pulmonary:      Effort: Pulmonary effort is normal. No respiratory distress. Breath sounds: Normal breath sounds. No stridor. No wheezing, rhonchi or rales. Chest:      Chest wall: No tenderness. Breasts:         Right: Normal. No swelling, bleeding, inverted nipple, mass, nipple discharge, skin change or tenderness. Left: Normal. No swelling, bleeding, inverted nipple, mass, nipple discharge, skin change or tenderness. Abdominal:      General: Abdomen is flat. Bowel sounds are normal. There is no distension. Palpations: Abdomen is soft. There is no hepatomegaly or mass. Tenderness: There is no abdominal tenderness. There is no guarding or rebound. Hernia: No hernia is present. There is no hernia in the left inguinal area. Genitourinary:     General: Normal vulva. Exam position: Lithotomy position. Pubic Area: No rash. Labia:         Right: No rash, tenderness, lesion or injury. Left: No rash, tenderness, lesion or injury. Urethra: No prolapse, urethral pain, urethral swelling or urethral lesion. Vagina: Normal. No signs of injury and foreign body. No vaginal discharge, erythema, tenderness, bleeding, lesions or prolapsed vaginal walls.       Cervix: No

## 2021-04-12 DIAGNOSIS — N39.0 URINARY TRACT INFECTION WITHOUT HEMATURIA, SITE UNSPECIFIED: Primary | ICD-10-CM

## 2021-04-12 RX ORDER — NITROFURANTOIN 25; 75 MG/1; MG/1
100 CAPSULE ORAL 2 TIMES DAILY
Qty: 14 CAPSULE | Refills: 0 | OUTPATIENT
Start: 2021-04-12 | End: 2021-04-19

## 2021-04-30 LAB
CATARACTS: POSITIVE
DIABETIC RETINOPATHY: NEGATIVE
GLAUCOMA: NEGATIVE
INTRAOCULAR PRESSURE EYE: NORMAL
VISUAL ACUITY DISTANCE LEFT EYE: NORMAL
VISUAL ACUITY DISTANCE RIGHT EYE: NORMAL

## 2021-05-01 DIAGNOSIS — E78.5 DYSLIPIDEMIA ASSOCIATED WITH TYPE 2 DIABETES MELLITUS (HCC): ICD-10-CM

## 2021-05-01 DIAGNOSIS — Z79.4 TYPE 2 DIABETES MELLITUS WITH DIABETIC NEPHROPATHY, WITH LONG-TERM CURRENT USE OF INSULIN (HCC): ICD-10-CM

## 2021-05-01 DIAGNOSIS — E11.69 TYPE 2 DIABETES MELLITUS WITH OTHER SPECIFIED COMPLICATION, WITH LONG-TERM CURRENT USE OF INSULIN (HCC): ICD-10-CM

## 2021-05-01 DIAGNOSIS — E11.9 TYPE 2 DIABETES MELLITUS WITHOUT COMPLICATION, WITH LONG-TERM CURRENT USE OF INSULIN (HCC): ICD-10-CM

## 2021-05-01 DIAGNOSIS — E11.21 TYPE 2 DIABETES MELLITUS WITH DIABETIC NEPHROPATHY, WITH LONG-TERM CURRENT USE OF INSULIN (HCC): ICD-10-CM

## 2021-05-01 DIAGNOSIS — E11.3299 TYPE 2 DIABETES MELLITUS WITH BACKGROUND RETINOPATHY (HCC): ICD-10-CM

## 2021-05-01 DIAGNOSIS — Z79.4 TYPE 2 DIABETES MELLITUS WITHOUT COMPLICATION, WITH LONG-TERM CURRENT USE OF INSULIN (HCC): ICD-10-CM

## 2021-05-01 DIAGNOSIS — R25.2 LEG CRAMPS: ICD-10-CM

## 2021-05-01 DIAGNOSIS — E55.9 VITAMIN D DEFICIENCY: ICD-10-CM

## 2021-05-01 DIAGNOSIS — E66.01 MORBID OBESITY DUE TO EXCESS CALORIES (HCC): ICD-10-CM

## 2021-05-01 DIAGNOSIS — L68.0 HIRSUTISM: ICD-10-CM

## 2021-05-01 DIAGNOSIS — E11.69 DYSLIPIDEMIA ASSOCIATED WITH TYPE 2 DIABETES MELLITUS (HCC): ICD-10-CM

## 2021-05-01 DIAGNOSIS — B37.49 CANDIDA UTI: ICD-10-CM

## 2021-05-01 DIAGNOSIS — Z79.4 TYPE 2 DIABETES MELLITUS WITH OTHER SPECIFIED COMPLICATION, WITH LONG-TERM CURRENT USE OF INSULIN (HCC): ICD-10-CM

## 2021-05-01 LAB
A/G RATIO: 1.5 (ref 1.1–2.2)
ALBUMIN SERPL-MCNC: 4.3 G/DL (ref 3.4–5)
ALP BLD-CCNC: 131 U/L (ref 40–129)
ALT SERPL-CCNC: 15 U/L (ref 10–40)
ANION GAP SERPL CALCULATED.3IONS-SCNC: 11 MMOL/L (ref 3–16)
AST SERPL-CCNC: 12 U/L (ref 15–37)
BILIRUB SERPL-MCNC: 0.3 MG/DL (ref 0–1)
BUN BLDV-MCNC: 32 MG/DL (ref 7–20)
CALCIUM SERPL-MCNC: 9.5 MG/DL (ref 8.3–10.6)
CHLORIDE BLD-SCNC: 98 MMOL/L (ref 99–110)
CHOLESTEROL, FASTING: 149 MG/DL (ref 0–199)
CO2: 25 MMOL/L (ref 21–32)
CREAT SERPL-MCNC: 0.9 MG/DL (ref 0.6–1.1)
CREATININE URINE: 48.2 MG/DL (ref 28–259)
GFR AFRICAN AMERICAN: >60
GFR NON-AFRICAN AMERICAN: >60
GLOBULIN: 2.9 G/DL
GLUCOSE BLD-MCNC: 215 MG/DL (ref 70–99)
HDLC SERPL-MCNC: 69 MG/DL (ref 40–60)
LDL CHOLESTEROL CALCULATED: 65 MG/DL
MAGNESIUM: 2 MG/DL (ref 1.8–2.4)
MICROALBUMIN UR-MCNC: <1.2 MG/DL
MICROALBUMIN/CREAT UR-RTO: NORMAL MG/G (ref 0–30)
POTASSIUM SERPL-SCNC: 4.9 MMOL/L (ref 3.5–5.1)
SODIUM BLD-SCNC: 134 MMOL/L (ref 136–145)
TOTAL PROTEIN: 7.2 G/DL (ref 6.4–8.2)
TRIGLYCERIDE, FASTING: 76 MG/DL (ref 0–150)
VITAMIN D 25-HYDROXY: 24 NG/ML
VLDLC SERPL CALC-MCNC: 15 MG/DL

## 2021-05-02 LAB
ESTIMATED AVERAGE GLUCOSE: 211.6 MG/DL
HBA1C MFR BLD: 9 %

## 2021-05-04 PROBLEM — E11.9 TYPE 2 DIABETES MELLITUS (HCC): Status: ACTIVE | Noted: 2018-03-15

## 2021-05-04 LAB
FRUCTOSAMINE: 383 UMOL/L (ref 170–285)
HEMOGLOBIN ELECTROPHORESIS: NORMAL
HGB ELECTROPHORESIS INTERP: NORMAL

## 2021-05-08 DIAGNOSIS — E11.9 TYPE 2 DIABETES MELLITUS WITHOUT COMPLICATION, WITH LONG-TERM CURRENT USE OF INSULIN (HCC): ICD-10-CM

## 2021-05-08 DIAGNOSIS — E11.21 TYPE 2 DIABETES MELLITUS WITH DIABETIC NEPHROPATHY, WITH LONG-TERM CURRENT USE OF INSULIN (HCC): ICD-10-CM

## 2021-05-08 DIAGNOSIS — Z79.4 TYPE 2 DIABETES MELLITUS WITHOUT COMPLICATION, WITH LONG-TERM CURRENT USE OF INSULIN (HCC): ICD-10-CM

## 2021-05-08 DIAGNOSIS — Z79.4 TYPE 2 DIABETES MELLITUS WITH DIABETIC NEPHROPATHY, WITH LONG-TERM CURRENT USE OF INSULIN (HCC): ICD-10-CM

## 2021-05-08 DIAGNOSIS — E11.69 TYPE 2 DIABETES MELLITUS WITH OTHER SPECIFIED COMPLICATION (HCC): ICD-10-CM

## 2021-05-08 DIAGNOSIS — E11.3299 TYPE 2 DIABETES MELLITUS WITH BACKGROUND RETINOPATHY (HCC): ICD-10-CM

## 2021-05-10 ENCOUNTER — VIRTUAL VISIT (OUTPATIENT)
Dept: ENDOCRINOLOGY | Age: 56
End: 2021-05-10
Payer: COMMERCIAL

## 2021-05-10 DIAGNOSIS — E11.69 DYSLIPIDEMIA ASSOCIATED WITH TYPE 2 DIABETES MELLITUS (HCC): ICD-10-CM

## 2021-05-10 DIAGNOSIS — E11.21 TYPE 2 DIABETES MELLITUS WITH DIABETIC NEPHROPATHY, WITH LONG-TERM CURRENT USE OF INSULIN (HCC): ICD-10-CM

## 2021-05-10 DIAGNOSIS — E78.5 DYSLIPIDEMIA ASSOCIATED WITH TYPE 2 DIABETES MELLITUS (HCC): ICD-10-CM

## 2021-05-10 DIAGNOSIS — E11.9 TYPE 2 DIABETES MELLITUS WITHOUT COMPLICATION, WITH LONG-TERM CURRENT USE OF INSULIN (HCC): Primary | ICD-10-CM

## 2021-05-10 DIAGNOSIS — Z79.4 TYPE 2 DIABETES MELLITUS WITHOUT COMPLICATION, WITH LONG-TERM CURRENT USE OF INSULIN (HCC): Primary | ICD-10-CM

## 2021-05-10 DIAGNOSIS — I10 ESSENTIAL HYPERTENSION: ICD-10-CM

## 2021-05-10 DIAGNOSIS — E11.3299 TYPE 2 DIABETES MELLITUS WITH BACKGROUND RETINOPATHY (HCC): ICD-10-CM

## 2021-05-10 DIAGNOSIS — Z79.4 TYPE 2 DIABETES MELLITUS WITH DIABETIC NEPHROPATHY, WITH LONG-TERM CURRENT USE OF INSULIN (HCC): ICD-10-CM

## 2021-05-10 DIAGNOSIS — E55.9 VITAMIN D DEFICIENCY: ICD-10-CM

## 2021-05-10 PROCEDURE — 3017F COLORECTAL CA SCREEN DOC REV: CPT | Performed by: INTERNAL MEDICINE

## 2021-05-10 PROCEDURE — G8427 DOCREV CUR MEDS BY ELIG CLIN: HCPCS | Performed by: INTERNAL MEDICINE

## 2021-05-10 PROCEDURE — 2022F DILAT RTA XM EVC RTNOPTHY: CPT | Performed by: INTERNAL MEDICINE

## 2021-05-10 PROCEDURE — 99214 OFFICE O/P EST MOD 30 MIN: CPT | Performed by: INTERNAL MEDICINE

## 2021-05-10 PROCEDURE — 3052F HG A1C>EQUAL 8.0%<EQUAL 9.0%: CPT | Performed by: INTERNAL MEDICINE

## 2021-05-10 RX ORDER — METFORMIN HYDROCHLORIDE 500 MG/1
1000 TABLET, EXTENDED RELEASE ORAL 2 TIMES DAILY WITH MEALS
Qty: 360 TABLET | Refills: 1 | Status: SHIPPED | OUTPATIENT
Start: 2021-05-10 | End: 2021-09-07

## 2021-05-10 RX ORDER — DULAGLUTIDE 3 MG/.5ML
3 INJECTION, SOLUTION SUBCUTANEOUS WEEKLY
Qty: 4 PEN | Refills: 5 | Status: SHIPPED | OUTPATIENT
Start: 2021-05-10 | End: 2021-08-30

## 2021-05-10 RX ORDER — ERGOCALCIFEROL (VITAMIN D2) 1250 MCG
50000 CAPSULE ORAL WEEKLY
Qty: 12 CAPSULE | Refills: 1 | Status: SHIPPED | OUTPATIENT
Start: 2021-05-10 | End: 2021-08-30 | Stop reason: SDUPTHER

## 2021-05-10 RX ORDER — EMPAGLIFLOZIN 25 MG/1
TABLET, FILM COATED ORAL
Qty: 30 TABLET | Refills: 5 | Status: SHIPPED | OUTPATIENT
Start: 2021-05-10 | End: 2021-11-04

## 2021-05-10 NOTE — PROGRESS NOTES
Patient ID:   Nilam Mohan is a 55 y.o. female    Chief Complaint:   Nilam Mohan presents for an evaluation of Type 2 Diabetes Mellitus , Hyperlipidemia and hypertension. Pursuant to the emergency declaration under the [facility], [address] [place] [address] and the [employer] Act this visit was conducted after obtaining verbal consent, with the use of [employer]. me interactive audio and video telecommunications system that permits real time communication between the patient and provider to substitute for an in-person clinic visit to reduce the patient's risk of exposure to COVID-19 and provide necessary medical care. Because this was a Telehealth visit, evaluation of the following organ systems was limited: Vitals, Constitutional, EENT, Resp, CV, GI, , MS, Neuro, Skin. Heme. Lymph, Imm. Tosin C Anderson was at home. Provider was at [facility]. No one else was involved. The patient has also been advised to contact this office for worsening conditions or problems, and seek emergency medical treatment and/or call 911 if deemed necessary. Subjective:   Type 2 Diabetes Mellitus diagnosed at age 30-32. On insulin since Nov 2017. Invokana caused UTI in past.     She stopped Dat Lips for cramping. She did not like Basalar or Lantus. Metformin ER 500mg, two tabs twice a day    Actos 15 mg daily   Trulicity 1.0WJ weekly  (Sunday)  Jardiance 25mg daily. She had multiple episodes UTI's     Checks blood sugars 0.4 times per day. 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 Occasional snacks (chips, pretzels). Diet pepsi. Exercise: Walk around the work. Denies chest pain, exertional dyspnea. Family history of CAD: in multiple family members   Denies smoking/alcohol.    Currently on ASA 81 mg daily     The following portions of the patient's history were reviewed and updated as appropriate:       Family History   Problem Relation Age of Onset    Diabetes Mother     High Blood Pressure Mother     High Cholesterol Mother     Uterine Cancer Mother     High Blood Pressure Father     Other Father         cardiac amyloidosis    Heart Disease Sister     Diabetes Maternal Aunt     Stroke Maternal Aunt     Heart Disease Maternal Uncle     Cancer Maternal Uncle     Cancer Maternal Grandmother     Diabetes Maternal Grandfather     Diabetes Maternal Aunt     Stroke Maternal Aunt     Diabetes Maternal Aunt     Stroke Maternal Aunt     Heart Disease Maternal Aunt     No Known Problems Brother     No Known Problems Paternal Aunt     No Known Problems Paternal Uncle     No Known Problems Paternal Grandmother     No Known Problems Paternal Grandfather     No Known Problems Other     Rheum Arthritis Neg Hx     Osteoarthritis Neg Hx     Asthma Neg Hx     Breast Cancer Neg Hx     Heart Failure Neg Hx     Hypertension Neg Hx     Migraines Neg Hx     Ovarian Cancer Neg Hx     Rashes/Skin Problems Neg Hx     Seizures Neg Hx     Thyroid Disease Neg Hx          Social History     Socioeconomic History    Marital status: Single     Spouse name: Not on file    Number of children: Not on file    Years of education: Not on file    Highest education level: Not on file   Occupational History    Occupation:  for [employer]   Social Needs    Financial resource strain: Not on file    Food insecurity     Worry: Not on file     Inability: Not on file    Transportation needs     Medical: Not on file     Non-medical: Not on file   Tobacco Use    Smoking status: Former Smoker     Packs/day: 0.25     Years: 10.00     Pack years: 2.50    Smokeless tobacco: Former User     Quit date: 6/6/2014    Tobacco comment: quit 2 years ago   Substance and Sexual Activity  Alcohol use:  Yes     Alcohol/week: 0.0 standard drinks     Comment: socially    Drug use: No    Sexual activity: Not Currently     Partners: Male   Lifestyle    Physical activity     Days per week: Not on file     Minutes per session: Not on file    Stress: Not on file   Relationships    Social connections     Talks on phone: Not on file     Gets together: Not on file     Attends [employer] service: Not on file     Active member of club or organization: Not on file     Attends meetings of clubs or organizations: Not on file     Relationship status: Not on file    Intimate partner violence     Fear of current or ex partner: Not on file     Emotionally abused: Not on file     Physically abused: Not on file     Forced sexual activity: Not on file   Other Topics Concern    Not on file   Social History Narrative    Not on file       Past Medical History:   Diagnosis Date    Arthritis of left acromioclavicular joint 6/19/2017    Cataract     Complete tear of left rotator cuff 6/19/2017    Essential hypertension 4/16/2018    Fibroid     Fibroids     uterine    Iron deficiency anemia 11/22/2020    Normal colonoscopy, mennorhagia    Iron deficiency anemia 11/22/2020    Rupture of biceps tendon 6/19/2017    Type II or unspecified type diabetes mellitus without mention of complication, not stated as uncontrolled     Urogenital trichomoniasis        Past Surgical History:   Procedure Laterality Date    COLONOSCOPY  5/23/16    incomplete, Dr Magalys Corona         No Known Allergies      Current Outpatient Medications:     rosuvastatin (CRESTOR) 10 MG tablet, TAKE 1 TABLET BY MOUTH EVERY DAY IN THE EVENING (Patient taking differently: every other day ), Disp: 30 tablet, Rfl: 8    spironolactone (ALDACTONE) 50 MG tablet, TAKE 1 TABLET BY MOUTH TWICE A DAY, Disp: 60 tablet, Rfl: 1    losartan (COZAAR) 100 MG tablet, TAKE 1 TABLET BY MOUTH EVERY DAY, Disp: 30 tablet, Rfl: 5    UNABLE TO FIND, OTC Radha chewable, Disp: , Rfl:     pioglitazone (ACTOS) 15 MG tablet, Take 1 tablet by mouth daily, Disp: 90 tablet, Rfl: 1    JARDIANCE 25 MG tablet, TAKE 1 TABLET BY MOUTH EVERY DAY, Disp: 30 tablet, Rfl: 5    TRULICITY 1.5 QG/3.8EK SOPN, INJECT 1 PEN INTO THE SKIN ONCE A WEEK, Disp: 12 pen, Rfl: 1    fluticasone (FLONASE) 50 MCG/ACT nasal spray, SPRAY 1 SPRAY INTO EACH NOSTRIL EVERY DAY, Disp: 1 Bottle, Rfl: 5    metFORMIN (GLUCOPHAGE) 500 MG tablet, Take 2 tablets by mouth 2 times daily (with meals), Disp: 124 tablet, Rfl: 5    ASPIRIN LOW DOSE 81 MG EC tablet, TAKE 1 TABLET BY MOUTH EVERY DAY, Disp: 30 tablet, Rfl: 8    MAGNESIUM CITRATE PO, Take by mouth OTC, Disp: , Rfl:     omeprazole (PRILOSEC) 40 MG delayed release capsule, Take 40 mg by mouth daily , Disp: , Rfl:     fexofenadine-pseudoephedrine (ALLEGRA-D 24HR) 180-240 MG per extended release tablet, Take 1 tablet by mouth daily (Patient taking differently: Take 1 tablet by mouth as needed ), Disp: 10 tablet, Rfl: 0    nystatin (MYCOSTATIN) 375520 UNIT/GM powder, Apply topically 2 times daily, Disp: 1 Bottle, Rfl: 2    docusate sodium (COLACE) 100 MG capsule, TAKE 1 CAPSULE BY MOUTH 2 TIMES DAILY AS NEEDED FOR CONSTIPATION, Disp: 50 capsule, Rfl: 1    celecoxib (CELEBREX) 200 MG capsule, TAKE 1 CAPSULE BY MOUTH EVERY DAY, Disp: 30 capsule, Rfl: 2    B-D ULTRAFINE III SHORT PEN 31G X 8 MM MISC, USE AS DIRECTED TO INJECT INSULIN EVERY DAY, Disp: , Rfl: 3    pregabalin (LYRICA) 25 MG capsule, Take 1 capsule by mouth 2 times daily for 30 days. (Patient not taking: Reported on 3/9/2021), Disp: 60 capsule, Rfl: 1      Review of Systems:    Constitutional: Negative for fever, chills, and unexpected weight change. HENT: Negative for congestion, ear pain, rhinorrhea,  sore throat and trouble swallowing. Eyes: Negative for photophobia, redness, itching. Respiratory: Negative for cough, shortness of breath and sputum.    Cardiovascular: Negative for chest pain, palpitations and leg swelling. Gastrointestinal: Negative for nausea, vomiting, abdominal pain, diarrhea, constipation. Endocrine: Negative for cold intolerance, heat intolerance, polydipsia, polyphagia and polyuria. Genitourinary: Negative for dysuria, urgency, frequency, hematuria and flank pain. Musculoskeletal: Negative for myalgias, back pain, arthralgias and neck pain. Skin/Nail: Negative for rash, itching. Normal nails. Neurological: Negative for seizures, weakness, light-headedness, numbness and headaches. Hematological/ Lymph nodes: Negative for adenopathy. Does not bruise/bleed easily. Psychiatric/Behavioral: Negative for suicidal ideas, depression, anxiety, sleep disturbance and decreased concentration. Objective:   Physical Exam:  There were no vitals taken for this visit. Constitutional: Patient is oriented to person, place, and time. Patient appears well-developed and well-nourished. Pulmonary/Chest: Effort normal.   Neurological: Patient is alert and oriented to person, place, and time. Psychiatric: Patient has a normal mood and affect.  Patient behavior is normal.     Lab Review:    Orders Only on 05/01/2021   Component Date Value Ref Range Status    Hemoglobin, Elp 05/01/2021 See Comment   Final    Fructosamine 05/01/2021 383* 170 - 285 umol/L Final    Magnesium 05/01/2021 2.00  1.80 - 2.40 mg/dL Final    Vit D, 25-Hydroxy 05/01/2021 24.0* >=30 ng/mL Final    Hemoglobin A1C 05/01/2021 9.0  See comment % Final    eAG 05/01/2021 211.6  mg/dL Final    Cholesterol, Fasting 05/01/2021 149  0 - 199 mg/dL Final    Triglyceride, Fasting 05/01/2021 76  0 - 150 mg/dL Final    HDL 05/01/2021 69* 40 - 60 mg/dL Final    LDL Calculated 05/01/2021 65  <100 mg/dL Final    VLDL Cholesterol Calculated 05/01/2021 15  Not Established mg/dL Final    Sodium 05/01/2021 134* 136 - 145 mmol/L Final    Potassium 05/01/2021 4.9  3.5 - 5.1 mmol/L Final    3.5 - 5.1 mmol/L Final    Chloride 11/18/2020 99  99 - 110 mmol/L Final    CO2 11/18/2020 22  21 - 32 mmol/L Final    Anion Gap 11/18/2020 15  3 - 16 Final    Glucose 11/18/2020 138* 70 - 99 mg/dL Final    BUN 11/18/2020 27* 7 - 20 mg/dL Final    CREATININE 11/18/2020 0.8  0.6 - 1.1 mg/dL Final    GFR Non- 11/18/2020 >60  >60 Final    GFR  11/18/2020 >60  >60 Final    Calcium 11/18/2020 9.6  8.3 - 10.6 mg/dL Final    Total Protein 11/18/2020 7.2  6.4 - 8.2 g/dL Final    Albumin 11/18/2020 4.3  3.4 - 5.0 g/dL Final    Albumin/Globulin Ratio 11/18/2020 1.5  1.1 - 2.2 Final    Total Bilirubin 11/18/2020 <0.2  0.0 - 1.0 mg/dL Final    Alkaline Phosphatase 11/18/2020 145* 40 - 129 U/L Final    ALT 11/18/2020 11  10 - 40 U/L Final    AST 11/18/2020 11* 15 - 37 U/L Final    Globulin 11/18/2020 2.9  g/dL Final    Iron 11/18/2020 49  37 - 145 ug/dL Final    TIBC 11/18/2020 454* 260 - 445 ug/dL Final    Iron Saturation 11/18/2020 11* 15 - 50 % Final    Ferritin 11/18/2020 13.3* 15.0 - 150.0 ng/mL Final    Vitamin B-12 11/18/2020 573  211 - 911 pg/mL Final    Folate 11/18/2020 6.09  4.78 - 24.20 ng/mL Final    WBC 11/18/2020 7.7  4.0 - 11.0 K/uL Final    RBC 11/18/2020 4.33  4.00 - 5.20 M/uL Final    Hemoglobin 11/18/2020 10.5* 12.0 - 16.0 g/dL Final    Hematocrit 11/18/2020 33.2* 36.0 - 48.0 % Final    MCV 11/18/2020 76.8* 80.0 - 100.0 fL Final    MCH 11/18/2020 24.4* 26.0 - 34.0 pg Final    MCHC 11/18/2020 31.7  31.0 - 36.0 g/dL Final    RDW 11/18/2020 17.9* 12.4 - 15.4 % Final    Platelets 06/87/2872 347  135 - 450 K/uL Final    MPV 11/18/2020 9.0  5.0 - 10.5 fL Final    Neutrophils % 11/18/2020 66.3  % Final    Lymphocytes % 11/18/2020 20.3  % Final    Monocytes % 11/18/2020 5.2  % Final    Eosinophils % 11/18/2020 7.3  % Final    Basophils % 11/18/2020 0.9  % Final    Neutrophils Absolute 11/18/2020 5.1  1.7 - 7.7 K/uL Final    Lymphocytes Absolute 11/18/2020 1.6  1.0 - 5.1 K/uL Final    Monocytes Absolute 11/18/2020 0.4  0.0 - 1.3 K/uL Final    Eosinophils Absolute 11/18/2020 0.6  0.0 - 0.6 K/uL Final    Basophils Absolute 11/18/2020 0.1  0.0 - 0.2 K/uL Final    hCG Quant 11/18/2020 <5.0  <5.0 mIU/mL Final   Orders Only on 07/22/2020   Component Date Value Ref Range Status    Visual Acuity Distance Right Eye 07/22/2020 20/100   Final    Visual Acuity Distance Left Eye 07/22/2020 20/50   Final    Intraocular Pressure Eye 07/22/2020    Final                    Value:14  14      Diabetic Retinopathy 07/22/2020 POSITIVE - MONITOR   Final    Cataracts 07/22/2020 POSITIVE   Final    Glaucoma 07/22/2020 NEGATIVE   Final   Office Visit on 06/22/2020   Component Date Value Ref Range Status    SARS-CoV-2 06/22/2020 Not Detected  Not Detected Final    Source 06/22/2020 OP swab   Final   Orders Only on 06/02/2020   Component Date Value Ref Range Status    Testosterone 06/02/2020 16* 20 - 70 ng/dL Final    Sex Hormone Binding 06/02/2020 17* 30 - 135 nmol/L Final    Testosterone, Free 06/02/2020 4.0* 0.6 - 3.8 pg/mL Final    Hemoglobin A1C 06/02/2020 9.4  See comment % Final    eAG 06/02/2020 223.1  mg/dL Final    Sodium 06/02/2020 135* 136 - 145 mmol/L Final    Potassium 06/02/2020 4.7  3.5 - 5.1 mmol/L Final    Chloride 06/02/2020 96* 99 - 110 mmol/L Final    CO2 06/02/2020 23  21 - 32 mmol/L Final    Anion Gap 06/02/2020 16  3 - 16 Final    Glucose 06/02/2020 215* 70 - 99 mg/dL Final    BUN 06/02/2020 17  7 - 20 mg/dL Final    CREATININE 06/02/2020 0.9  0.6 - 1.1 mg/dL Final    GFR Non- 06/02/2020 >60  >60 Final    GFR  06/02/2020 >60  >60 Final    Calcium 06/02/2020 9.7  8.3 - 10.6 mg/dL Final    Total Protein 06/02/2020 7.4  6.4 - 8.2 g/dL Final    Albumin 06/02/2020 4.4  3.4 - 5.0 g/dL Final    Albumin/Globulin Ratio 06/02/2020 1.5  1.1 - 2.2 Final    Total Bilirubin 06/02/2020 0.3  0.0 - 1.0 mg/dL Final  Alkaline Phosphatase 06/02/2020 148* 40 - 129 U/L Final    ALT 06/02/2020 16  10 - 40 U/L Final    AST 06/02/2020 14* 15 - 37 U/L Final    Globulin 06/02/2020 3.0  g/dL Final           Assessment and Plan     There are no diagnoses linked to this encounter. 1: Type 2 DM complicated with nephropathy, retinopathy   Uncontrolled A1C 9% <  8.1% < 9.4% < 8.9% < 8.8% < 8.9 % < 8.7% < 9.7%     A1C is unreliable with iron deficiency anemia   Sister and mother also have anemia     Fructosamine 383 eq to A1C of 9.6%. consistent with A1C     Hb electrophoresis normal May 2021   Blood sugars are high   She needs to check more frequent blood sugars , 3 x per day     She does not want to take inusulin although indicated   Option is to go up on Trulicity     C/w    Metformin ER 500mg, two tabs twice a day   Actos 15 mg daily   Jardiance 25mg daily. She is still getting yeast infection. Recommend stopping it. For UTI see PCP   Change Trulicity to 3.0 mg weekly     If blood sugars are still high start DAVIS     All instructions provided in written. Check Blood sugars 3 times per day. Log them along with insulin and send them every 2 weeks. Call for blood sugars less than 60 or more than 400. Eye exam: Last exam in July 2020.  present, stable. She will need surgery for cataracts. Foot exam:  Sep 2020. dystrophic nail with fungal infection of tos, extending to skin on left foot   Deformity/amputation: absent  Skin lesions/pre-ulcerative calluses: absent  Edema: right- negative, left- negative  Sensory exam: Monofilament sensation: normal  Pulses: normal, Vibration (128 Hz): moderately impaired     Renal screen: Normal May 2021     TSH screen: Nov 2017     2: HTN   Controlled at home     3: Hyperlipidemia   LDL: 65, HDL: 69, TGs: 76 - May 2021     Crestor 10mg, every other day.      Vit D Def: 24 - May 2021    Start ergocalciferol 50k units weekly     4: Hirsutism, since 20's   On face only   Runs in the family signed by Theresa Fuentes MD on 5/10/2021 at 3:56 PM

## 2021-05-11 RX ORDER — ASPIRIN 81 MG/1
TABLET, COATED ORAL
Qty: 30 TABLET | Refills: 11 | Status: SHIPPED | OUTPATIENT
Start: 2021-05-11

## 2021-05-11 NOTE — TELEPHONE ENCOUNTER
Medication:   Requested Prescriptions     Pending Prescriptions Disp Refills    ASPIRIN LOW DOSE 81 MG EC tablet [Pharmacy Med Name: ASPIRIN EC 81 MG TABLET] 30 tablet 8     Sig: TAKE 1 TABLET BY MOUTH EVERY DAY        Last Filled:      Patient Phone Number: [phone] (home) [phone] (work)    Last appt: 03/09/2021  Next appt: Visit date not found    Last OARRS: No flowsheet data found.

## 2021-05-14 ENCOUNTER — OFFICE VISIT (OUTPATIENT)
Dept: INTERNAL MEDICINE CLINIC | Age: 56
End: 2021-05-14
Payer: COMMERCIAL

## 2021-05-14 VITALS
BODY MASS INDEX: 36.59 KG/M2 | WEIGHT: 233.6 LBS | HEART RATE: 96 BPM | DIASTOLIC BLOOD PRESSURE: 70 MMHG | OXYGEN SATURATION: 99 % | SYSTOLIC BLOOD PRESSURE: 124 MMHG

## 2021-05-14 DIAGNOSIS — Z01.818 PRE-OP EVALUATION: ICD-10-CM

## 2021-05-14 DIAGNOSIS — I10 ESSENTIAL HYPERTENSION: ICD-10-CM

## 2021-05-14 DIAGNOSIS — E11.65 TYPE 2 DIABETES MELLITUS WITH HYPERGLYCEMIA, WITHOUT LONG-TERM CURRENT USE OF INSULIN (HCC): ICD-10-CM

## 2021-05-14 DIAGNOSIS — H26.9 CATARACT OF RIGHT EYE, UNSPECIFIED CATARACT TYPE: Primary | ICD-10-CM

## 2021-05-14 PROBLEM — Z79.4 TYPE 2 DIABETES MELLITUS WITH DIABETIC NEPHROPATHY, WITH LONG-TERM CURRENT USE OF INSULIN (HCC): Status: RESOLVED | Noted: 2018-04-16 | Resolved: 2021-05-14

## 2021-05-14 PROBLEM — E11.9 TYPE 2 DIABETES MELLITUS, WITHOUT LONG-TERM CURRENT USE OF INSULIN (HCC): Status: ACTIVE | Noted: 2018-04-16

## 2021-05-14 PROBLEM — E11.21 TYPE 2 DIABETES MELLITUS WITH DIABETIC NEPHROPATHY, WITH LONG-TERM CURRENT USE OF INSULIN (HCC): Status: RESOLVED | Noted: 2018-04-16 | Resolved: 2021-05-14

## 2021-05-14 PROCEDURE — 3052F HG A1C>EQUAL 8.0%<EQUAL 9.0%: CPT | Performed by: INTERNAL MEDICINE

## 2021-05-14 PROCEDURE — 1036F TOBACCO NON-USER: CPT | Performed by: INTERNAL MEDICINE

## 2021-05-14 PROCEDURE — 3017F COLORECTAL CA SCREEN DOC REV: CPT | Performed by: INTERNAL MEDICINE

## 2021-05-14 PROCEDURE — 2022F DILAT RTA XM EVC RTNOPTHY: CPT | Performed by: INTERNAL MEDICINE

## 2021-05-14 PROCEDURE — 99214 OFFICE O/P EST MOD 30 MIN: CPT | Performed by: INTERNAL MEDICINE

## 2021-05-14 PROCEDURE — G8427 DOCREV CUR MEDS BY ELIG CLIN: HCPCS | Performed by: INTERNAL MEDICINE

## 2021-05-14 PROCEDURE — G8417 CALC BMI ABV UP PARAM F/U: HCPCS | Performed by: INTERNAL MEDICINE

## 2021-05-14 ASSESSMENT — ENCOUNTER SYMPTOMS
SHORTNESS OF BREATH: 0
COUGH: 0

## 2021-05-14 NOTE — PATIENT INSTRUCTIONS
Take sennakot 2 tablets after 2 days without bowel movement. Ok to take  Metformin, prilosec and losartan prior to surgery.

## 2021-05-14 NOTE — ASSESSMENT & PLAN NOTE
Well-controlled, continue current medications.  Can take losartan with sip of water morning of surgyer

## 2021-05-14 NOTE — ASSESSMENT & PLAN NOTE
Not well controlled, with A1c at 9.0. Recent medication adjustment made by endocrinologist. Discussed impact of uncontrolled diabetes on overall health as well as eye health.

## 2021-05-14 NOTE — PROGRESS NOTES
[facility]- Internal Medicine  Preoperative Consultation      2021    Markel Pa  :  1965    Chief Complaint   Patient presents with    Pre-op Exam     (Phacoemulsification with intraocular lens implant Eye:Ocul Saleem  2021, w/ Dr. Philip Nguyen III I:[id number] of [id number], [id number]        HPI:  This patient presents to the office today for preoperative consultation at the request of Dr. Ruthy Espinosa to assess stability of patient's medical condition(s) listed below. She will be having right cataract surgery 2021. Diabetes: has known retinopathy and diabetes currently not controlled, with most recent A1c at 9.0. Dose of Trulicity was just increased but hasn't yet picked up new strength. Was hoping to change to ozempic but states endocrine doesn't want to change her. Highest was 189 in past week, and best was 130. Review of Systems   Constitutional: Negative for chills and fever. Respiratory: Negative for cough and shortness of breath. Cardiovascular: Negative for chest pain and palpitations. Skin: Negative for rash. Hematological: Does not bruise/bleed easily.      Known anesthesia problems: None   Bleeding risk: No recent or remote history of abnormal bleeding  Personal or FH of DVT/PE: No    Recent steroid use: no  Exercise tolerance: 4-10METS  Can take care of self, such as eat, dress, or use the toilet (1 MET)  Can walk up a flight of steps or a hill or walk on level ground at 3 to 4 mph (4 METs)  Can do heavy work around the house, such as scrubbing floors or lifting or moving heavy furniture, or climb two flights of stairs (between 4 and 10 METs)  Can participate in strenuous sports such as swimming, singles tennis, football, basketball, and skiing (>10 METs)    Past Medical History:   Diagnosis Date    Arthritis of left acromioclavicular joint 2017    Cataract     Complete tear of left rotator cuff 2017    Essential hypertension 4/16/2018    Fibroid     Fibroids     uterine    Iron deficiency anemia 11/22/2020    Normal colonoscopy, mennorhagia    Iron deficiency anemia 11/22/2020    Rupture of biceps tendon 6/19/2017    Type II or unspecified type diabetes mellitus without mention of complication, not stated as uncontrolled     Urogenital trichomoniasis      Past Surgical History:   Procedure Laterality Date    COLONOSCOPY  5/23/16    incomplete, Dr Nicholas Pena History   Problem Relation Age of Onset    Diabetes Mother     High Blood Pressure Mother     High Cholesterol Mother     Uterine Cancer Mother     High Blood Pressure Father     Other Father         cardiac amyloidosis    Heart Disease Sister     Diabetes Maternal Aunt     Stroke Maternal Aunt     Heart Disease Maternal Uncle     Cancer Maternal Uncle     Cancer Maternal Grandmother     Diabetes Maternal Grandfather     Diabetes Maternal Aunt     Stroke Maternal Aunt     Diabetes Maternal Aunt     Stroke Maternal Aunt     Heart Disease Maternal Aunt     No Known Problems Brother     No Known Problems Paternal Aunt     No Known Problems Paternal Uncle     No Known Problems Paternal Grandmother     No Known Problems Paternal Grandfather     No Known Problems Other     Rheum Arthritis Neg Hx     Osteoarthritis Neg Hx     Asthma Neg Hx     Breast Cancer Neg Hx     Heart Failure Neg Hx     Hypertension Neg Hx     Migraines Neg Hx     Ovarian Cancer Neg Hx     Rashes/Skin Problems Neg Hx     Seizures Neg Hx     Thyroid Disease Neg Hx      Social History     Tobacco Use    Smoking status: Former Smoker     Packs/day: 0.25     Years: 10.00     Pack years: 2.50    Smokeless tobacco: Former User     Quit date: 6/6/2014    Tobacco comment: quit 2 years ago   Substance Use Topics    Alcohol use:  Yes     Alcohol/week: 0.0 standard drinks     Comment: socially    Drug use: No     No Known Allergies Prior to Visit Medications    Medication Sig Taking?  Authorizing Provider   Inulin (FIBERCHOICE PO) Take 4 Wafers by mouth daily Yes Historical Provider, MD   ASPIRIN LOW DOSE 81 MG EC tablet TAKE 1 TABLET BY MOUTH EVERY DAY Yes Shivani Arreola MD   JARDIANCE 25 MG tablet TAKE 1 TABLET BY MOUTH EVERY DAY Yes Chey Steele MD   Dulaglutide (TRULICITY) 3 RH/0.0EY SOPN Inject 3 mg into the skin once a week Yes Chey Steele MD   metFORMIN (GLUCOPHAGE-XR) 500 MG extended release tablet Take 2 tablets by mouth 2 times daily (with meals) Yes Chey Steele MD   ergocalciferol (DRISDOL) 1.25 MG (38337 UT) capsule Take 1 capsule by mouth once a week Yes Chey Steele MD   rosuvastatin (CRESTOR) 10 MG tablet TAKE 1 TABLET BY MOUTH EVERY DAY IN THE EVENING  Patient taking differently: every other day  Yes Chey Steele MD   spironolactone (ALDACTONE) 50 MG tablet TAKE 1 TABLET BY MOUTH TWICE A DAY Yes Chey Steele MD   losartan (COZAAR) 100 MG tablet TAKE 1 TABLET BY MOUTH EVERY DAY Yes Chey Steele MD   UNABLE TO FIND OTC Radha chewable Yes Historical Provider, MD   pioglitazone (ACTOS) 15 MG tablet Take 1 tablet by mouth daily Yes Chey Steele MD   fluticasone (FLONASE) 50 MCG/ACT nasal spray SPRAY 1 SPRAY INTO EACH NOSTRIL EVERY DAY Yes Shivani Arreola MD   MAGNESIUM CITRATE PO Take by mouth OTC Yes Historical Provider, MD   omeprazole (PRILOSEC) 40 MG delayed release capsule Take 40 mg by mouth daily  Yes Historical Provider, MD   nystatin (MYCOSTATIN) 834224 UNIT/GM powder Apply topically 2 times daily Yes Jahaira Wolff MD   docusate sodium (COLACE) 100 MG capsule TAKE 1 CAPSULE BY MOUTH 2 TIMES DAILY AS NEEDED FOR CONSTIPATION Yes Nishant Dawn MD   celecoxib (CELEBREX) 200 MG capsule TAKE 1 CAPSULE BY MOUTH EVERY DAY Yes Naomi Wills MD   B-D ULTRAFINE III SHORT PEN 31G X 8 MM MISC USE AS DIRECTED TO INJECT INSULIN EVERY DAY Yes Historical Provider, MD   fexofenadine-pseudoephedrine (ALLEGRA-D 24HR) 180-240 MG per extended release tablet Take 1 tablet by mouth daily  Patient not taking: Reported on 5/14/2021  Stas Mcconnell MD       Physical Exam:  /70   Pulse 96   Wt 233 lb 9.6 oz (106 kg)   LMP 02/02/2021   SpO2 99%   BMI 36.59 kg/m²   Physical Exam  EKG Interpretation:  not indicated. Lab Review:   Orders Only on 05/01/2021   Component Date Value    Fructosamine 05/01/2021 383*    Magnesium 05/01/2021 2.00     Hemoglobin A1C 05/01/2021 9.0     eAG 05/01/2021 211.6     Sodium 05/01/2021 134*    Potassium 05/01/2021 4.9     Chloride 05/01/2021 98*    CO2 05/01/2021 25     Anion Gap 05/01/2021 11     Glucose 05/01/2021 215*    BUN 05/01/2021 32*    CREATININE 05/01/2021 0.9     GFR  05/01/2021 >60         ASSESSMENT/PLAN:   Cataract of right eye, unspecified cataract type  Pre-op evaluation  Comments:  Medically stable, with no contraindication to surgery. Type 2 diabetes mellitus with hyperglycemia, without long-term current use of insulin (HCC)  Assessment & Plan:  Not well controlled, with A1c at 9.0. Recent medication adjustment made by endocrinologist. Discussed impact of uncontrolled diabetes on overall health as well as eye health. Essential hypertension  Assessment & Plan:   Well-controlled, continue current medications. Can take losartan with sip of water morning of surgyer      Pt advised to avoid NSAID's, OTC vitamin supplements and fish oil 1 week prior to procedure.

## 2021-07-07 DIAGNOSIS — E11.3299 TYPE 2 DIABETES MELLITUS WITH BACKGROUND RETINOPATHY (HCC): ICD-10-CM

## 2021-07-07 DIAGNOSIS — Z79.4 TYPE 2 DIABETES MELLITUS WITHOUT COMPLICATION, WITH LONG-TERM CURRENT USE OF INSULIN (HCC): ICD-10-CM

## 2021-07-07 DIAGNOSIS — Z79.4 TYPE 2 DIABETES MELLITUS WITH DIABETIC NEPHROPATHY, WITH LONG-TERM CURRENT USE OF INSULIN (HCC): ICD-10-CM

## 2021-07-07 DIAGNOSIS — E11.21 TYPE 2 DIABETES MELLITUS WITH DIABETIC NEPHROPATHY, WITH LONG-TERM CURRENT USE OF INSULIN (HCC): ICD-10-CM

## 2021-07-07 DIAGNOSIS — E11.9 TYPE 2 DIABETES MELLITUS WITHOUT COMPLICATION, WITH LONG-TERM CURRENT USE OF INSULIN (HCC): ICD-10-CM

## 2021-07-07 RX ORDER — PIOGLITAZONEHYDROCHLORIDE 15 MG/1
TABLET ORAL
Qty: 30 TABLET | Refills: 5 | Status: SHIPPED | OUTPATIENT
Start: 2021-07-07 | End: 2022-01-06

## 2021-07-23 ENCOUNTER — TELEPHONE (OUTPATIENT)
Dept: INTERNAL MEDICINE CLINIC | Age: 56
End: 2021-07-23

## 2021-07-23 RX ORDER — OMEPRAZOLE 40 MG/1
40 CAPSULE, DELAYED RELEASE ORAL DAILY
Qty: 30 CAPSULE | Refills: 3 | Status: SHIPPED | OUTPATIENT
Start: 2021-07-23 | End: 2021-12-09

## 2021-07-23 NOTE — TELEPHONE ENCOUNTER
Patient is requesting the following prescription(s):    Last appointment: 5/14/2021  Next appointment: 9/10/2021  Last refill: 04-    omeprazole (PRILOSEC) 40 MG delayed release capsule Take 40 mg by mouth daily    #30 w/ refills    Patient is having a flare up and is requesting this please be done as soon as possible. Pharmacy can be reached @ phone # provided should there be any questions.

## 2021-08-07 DIAGNOSIS — Z79.4 TYPE 2 DIABETES MELLITUS WITH OTHER SPECIFIED COMPLICATION, WITH LONG-TERM CURRENT USE OF INSULIN (HCC): ICD-10-CM

## 2021-08-07 DIAGNOSIS — E11.69 TYPE 2 DIABETES MELLITUS WITH OTHER SPECIFIED COMPLICATION, WITH LONG-TERM CURRENT USE OF INSULIN (HCC): ICD-10-CM

## 2021-08-07 DIAGNOSIS — I10 ESSENTIAL HYPERTENSION: ICD-10-CM

## 2021-08-10 RX ORDER — LOSARTAN POTASSIUM 100 MG/1
TABLET ORAL
Qty: 30 TABLET | Refills: 5 | Status: SHIPPED | OUTPATIENT
Start: 2021-08-10 | End: 2022-04-07

## 2021-08-10 RX ORDER — HYDROCHLOROTHIAZIDE 25 MG/1
TABLET ORAL
Qty: 30 TABLET | Refills: 5 | Status: SHIPPED | OUTPATIENT
Start: 2021-08-10 | End: 2022-02-07

## 2021-08-30 ENCOUNTER — VIRTUAL VISIT (OUTPATIENT)
Dept: ENDOCRINOLOGY | Age: 56
End: 2021-08-30
Payer: COMMERCIAL

## 2021-08-30 DIAGNOSIS — E78.5 DYSLIPIDEMIA ASSOCIATED WITH TYPE 2 DIABETES MELLITUS (HCC): ICD-10-CM

## 2021-08-30 DIAGNOSIS — Z79.4 TYPE 2 DIABETES MELLITUS WITH DIABETIC NEPHROPATHY, WITH LONG-TERM CURRENT USE OF INSULIN (HCC): ICD-10-CM

## 2021-08-30 DIAGNOSIS — Z79.4 TYPE 2 DIABETES MELLITUS WITHOUT COMPLICATION, WITH LONG-TERM CURRENT USE OF INSULIN (HCC): ICD-10-CM

## 2021-08-30 DIAGNOSIS — E11.69 TYPE 2 DIABETES MELLITUS WITH OTHER SPECIFIED COMPLICATION, WITH LONG-TERM CURRENT USE OF INSULIN (HCC): Primary | ICD-10-CM

## 2021-08-30 DIAGNOSIS — E11.9 TYPE 2 DIABETES MELLITUS WITHOUT COMPLICATION, WITH LONG-TERM CURRENT USE OF INSULIN (HCC): ICD-10-CM

## 2021-08-30 DIAGNOSIS — E11.69 DYSLIPIDEMIA ASSOCIATED WITH TYPE 2 DIABETES MELLITUS (HCC): ICD-10-CM

## 2021-08-30 DIAGNOSIS — L68.0 HIRSUTISM: ICD-10-CM

## 2021-08-30 DIAGNOSIS — I10 ESSENTIAL HYPERTENSION: ICD-10-CM

## 2021-08-30 DIAGNOSIS — E55.9 VITAMIN D DEFICIENCY: ICD-10-CM

## 2021-08-30 DIAGNOSIS — Z79.4 TYPE 2 DIABETES MELLITUS WITH OTHER SPECIFIED COMPLICATION, WITH LONG-TERM CURRENT USE OF INSULIN (HCC): Primary | ICD-10-CM

## 2021-08-30 DIAGNOSIS — E11.21 TYPE 2 DIABETES MELLITUS WITH DIABETIC NEPHROPATHY, WITH LONG-TERM CURRENT USE OF INSULIN (HCC): ICD-10-CM

## 2021-08-30 DIAGNOSIS — E11.3299 TYPE 2 DIABETES MELLITUS WITH BACKGROUND RETINOPATHY (HCC): ICD-10-CM

## 2021-08-30 PROCEDURE — G8417 CALC BMI ABV UP PARAM F/U: HCPCS | Performed by: INTERNAL MEDICINE

## 2021-08-30 PROCEDURE — 1036F TOBACCO NON-USER: CPT | Performed by: INTERNAL MEDICINE

## 2021-08-30 PROCEDURE — G8427 DOCREV CUR MEDS BY ELIG CLIN: HCPCS | Performed by: INTERNAL MEDICINE

## 2021-08-30 PROCEDURE — 99214 OFFICE O/P EST MOD 30 MIN: CPT | Performed by: INTERNAL MEDICINE

## 2021-08-30 PROCEDURE — 3017F COLORECTAL CA SCREEN DOC REV: CPT | Performed by: INTERNAL MEDICINE

## 2021-08-30 PROCEDURE — 2022F DILAT RTA XM EVC RTNOPTHY: CPT | Performed by: INTERNAL MEDICINE

## 2021-08-30 PROCEDURE — 3052F HG A1C>EQUAL 8.0%<EQUAL 9.0%: CPT | Performed by: INTERNAL MEDICINE

## 2021-08-30 RX ORDER — SEMAGLUTIDE 1.34 MG/ML
1 INJECTION, SOLUTION SUBCUTANEOUS WEEKLY
Qty: 3 ML | Refills: 3 | Status: SHIPPED | OUTPATIENT
Start: 2021-08-30 | End: 2022-01-17

## 2021-08-30 RX ORDER — ERGOCALCIFEROL (VITAMIN D2) 1250 MCG
50000 CAPSULE ORAL WEEKLY
Qty: 12 CAPSULE | Refills: 1 | Status: SHIPPED | OUTPATIENT
Start: 2021-08-30 | End: 2022-10-03 | Stop reason: SDUPTHER

## 2021-08-30 RX ORDER — SPIRONOLACTONE 50 MG/1
TABLET, FILM COATED ORAL
Qty: 180 TABLET | Refills: 3 | Status: SHIPPED | OUTPATIENT
Start: 2021-08-30

## 2021-08-30 NOTE — PROGRESS NOTES
Patient ID:   Lupe Kumar is a 55 y.o. female    Chief Complaint:   Lupe Kumar presents for an evaluation of Type 2 Diabetes Mellitus , Hyperlipidemia and hypertension. Pursuant to the emergency declaration under the [facility], [address] [place] [address] and the [employer] Act this visit was conducted after obtaining verbal consent, with the use of [employer] me interactive audio and video telecommunications system that permits real time communication between the patient and provider to substitute for an in-person clinic visit to reduce the patient's risk of exposure to COVID-19 and provide necessary medical care. Because this was a Telehealth visit, evaluation of the following organ systems was limited: Vitals, Constitutional, EENT, Resp, CV, GI, , MS, Neuro, Skin. Heme. Lymph, Imm. Tosin C [place] was at home. Provider was at [facility] office. No one else was involved. The patient has also been advised to contact this office for worsening conditions or problems, and seek emergency medical treatment and/or call 911 if deemed necessary. Subjective:   Type 2 Diabetes Mellitus diagnosed at age 30-32. On insulin since Nov 2017. Invokana caused UTI in past.     She stopped Rudolfo Cogan for cramping. She did not like Basalar or Lantus. Metformin ER 500mg, two tabs twice a day    Actos 15 mg daily   Trulicity 3.0 mg weekly  (Sunday). She thinks it is not working. Jardiance 25mg daily (she likes to take it twice a day). H/o UTI's. Doing well now. BS Reading have been running 160-199    Checks blood sugars 0-1 times per day. 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 Occasional snacks (chips, pretzels). Diet pepsi. Exercise: Walk around the work. Denies chest pain, exertional dyspnea.      Family history of CAD: in multiple family members   Denies smoking/alcohol. Currently on ASA 81 mg daily     The following portions of the patient's history were reviewed and updated as appropriate:       Family History   Problem Relation Age of Onset    Diabetes Mother     High Blood Pressure Mother     High Cholesterol Mother     Uterine Cancer Mother     High Blood Pressure Father     Other Father         cardiac amyloidosis    Heart Disease Sister     Diabetes Maternal Aunt     Stroke Maternal Aunt     Heart Disease Maternal Uncle     Cancer Maternal Uncle     Cancer Maternal Grandmother     Diabetes Maternal Grandfather     Diabetes Maternal Aunt     Stroke Maternal Aunt     Diabetes Maternal Aunt     Stroke Maternal Aunt     Heart Disease Maternal Aunt     No Known Problems Brother     No Known Problems Paternal Aunt     No Known Problems Paternal Uncle     No Known Problems Paternal Grandmother     No Known Problems Paternal Grandfather     No Known Problems Other     Rheum Arthritis Neg Hx     Osteoarthritis Neg Hx     Asthma Neg Hx     Breast Cancer Neg Hx     Heart Failure Neg Hx     Hypertension Neg Hx     Migraines Neg Hx     Ovarian Cancer Neg Hx     Rashes/Skin Problems Neg Hx     Seizures Neg Hx     Thyroid Disease Neg Hx          Social History     Socioeconomic History    Marital status: Single     Spouse name: Not on file    Number of children: Not on file    Years of education: Not on file    Highest education level: Not on file   Occupational History    Occupation:  for [employer]   Tobacco Use    Smoking status: Former Smoker     Packs/day: 0.25     Years: 10.00     Pack years: 2.50    Smokeless tobacco: Former User     Quit date: 6/6/2014    Tobacco comment: quit 2 years ago   Vaping Use    Vaping Use: Never used   Substance and Sexual Activity    Alcohol use:  Yes     Alcohol/week: 0.0 standard drinks     Comment: socially    tablet, Rfl: 5    hydroCHLOROthiazide (HYDRODIURIL) 25 MG tablet, TAKE 1 TABLET BY MOUTH EVERY DAY, Disp: 30 tablet, Rfl: 5    omeprazole (PRILOSEC) 40 MG delayed release capsule, Take 1 capsule by mouth daily, Disp: 30 capsule, Rfl: 3    pioglitazone (ACTOS) 15 MG tablet, TAKE 1 TABLET BY MOUTH EVERY DAY, Disp: 30 tablet, Rfl: 5    ASPIRIN LOW DOSE 81 MG EC tablet, TAKE 1 TABLET BY MOUTH EVERY DAY, Disp: 30 tablet, Rfl: 11    JARDIANCE 25 MG tablet, TAKE 1 TABLET BY MOUTH EVERY DAY (Patient taking differently: Take 12.5 mg by mouth 2 times daily ), Disp: 30 tablet, Rfl: 5    Dulaglutide (TRULICITY) 3 FC/1.0LU SOPN, Inject 3 mg into the skin once a week, Disp: 4 pen, Rfl: 5    metFORMIN (GLUCOPHAGE-XR) 500 MG extended release tablet, Take 2 tablets by mouth 2 times daily (with meals), Disp: 360 tablet, Rfl: 1    rosuvastatin (CRESTOR) 10 MG tablet, TAKE 1 TABLET BY MOUTH EVERY DAY IN THE EVENING (Patient taking differently: every other day ), Disp: 30 tablet, Rfl: 8    spironolactone (ALDACTONE) 50 MG tablet, TAKE 1 TABLET BY MOUTH TWICE A DAY, Disp: 60 tablet, Rfl: 1    UNABLE TO FIND, OTC Radha chewable, Disp: , Rfl:     fluticasone (FLONASE) 50 MCG/ACT nasal spray, SPRAY 1 SPRAY INTO EACH NOSTRIL EVERY DAY (Patient taking differently: 1 spray by Nasal route daily ), Disp: 1 Bottle, Rfl: 5    MAGNESIUM CITRATE PO, Take by mouth OTC, Disp: , Rfl:     fexofenadine-pseudoephedrine (ALLEGRA-D 24HR) 180-240 MG per extended release tablet, Take 1 tablet by mouth daily (Patient taking differently: Take 1 tablet by mouth as needed ), Disp: 10 tablet, Rfl: 0    nystatin (MYCOSTATIN) 979874 UNIT/GM powder, Apply topically 2 times daily, Disp: 1 Bottle, Rfl: 2    docusate sodium (COLACE) 100 MG capsule, TAKE 1 CAPSULE BY MOUTH 2 TIMES DAILY AS NEEDED FOR CONSTIPATION, Disp: 50 capsule, Rfl: 1    celecoxib (CELEBREX) 200 MG capsule, TAKE 1 CAPSULE BY MOUTH EVERY DAY, Disp: 30 capsule, Rfl: 2    B-D ULTRAFINE III SHORT PEN 31G X 8 MM MISC, USE AS DIRECTED TO INJECT INSULIN EVERY DAY, Disp: , Rfl: 3    ergocalciferol (DRISDOL) 1.25 MG (65128 UT) capsule, Take 1 capsule by mouth once a week, Disp: 12 capsule, Rfl: 1      Review of Systems:    Constitutional: Negative for fever, chills, and unexpected weight change. HENT: Negative for congestion, ear pain, rhinorrhea,  sore throat and trouble swallowing. Eyes: Negative for photophobia, redness, itching. Respiratory: Negative for cough, shortness of breath and sputum. Cardiovascular: Negative for chest pain, palpitations and leg swelling. Gastrointestinal: Negative for nausea, vomiting, abdominal pain, diarrhea, constipation. Endocrine: Negative for cold intolerance, heat intolerance, polydipsia, polyphagia and polyuria. Genitourinary: Negative for dysuria, urgency, frequency, hematuria and flank pain. Musculoskeletal: Negative for myalgias, back pain, arthralgias and neck pain. Skin/Nail: Negative for rash, itching. Normal nails. Neurological: Negative for seizures, weakness, light-headedness, numbness and headaches. Hematological/ Lymph nodes: Negative for adenopathy. Does not bruise/bleed easily. Psychiatric/Behavioral: Negative for suicidal ideas, depression, anxiety, sleep disturbance and decreased concentration. Objective:   Physical Exam:  There were no vitals taken for this visit. Constitutional: Patient is oriented to person, place, and time. Patient appears well-developed and well-nourished. Pulmonary/Chest: Effort normal.   Neurological: Patient is alert and oriented to person, place, and time. Psychiatric: Patient has a normal mood and affect.  Patient behavior is normal.     Lab Review:    Orders Only on 08/26/2021   Component Date Value Ref Range Status    Vit D, 25-Hydroxy 08/26/2021 30.7  >=30 ng/mL Final    Hemoglobin A1C 08/26/2021 8.4  See comment % Final    eAG 08/26/2021 194.4  mg/dL Final   Orders Only on Culture, Routine 04/08/2021 <50,000 CFU/ml mixed skin/urogenital nathaniel.  No further workup   Final   Hospital Outpatient Visit on 04/01/2021   Component Date Value Ref Range Status    Visual Acuity Distance Right Eye 04/30/2021 20/70   Final    Visual Acuity Distance Left Eye 04/30/2021 20/40   Final    Intraocular Pressure Eye 04/30/2021    Final                    Value:15  17      Diabetic Retinopathy 04/30/2021 NEGATIVE   Final    Cataracts 04/30/2021 POSITIVE   Final    Glaucoma 04/30/2021 NEGATIVE   Final   Orders Only on 11/18/2020   Component Date Value Ref Range Status    Hemoglobin A1C 11/18/2020 8.1  See comment % Final    eAG 11/18/2020 185.8  mg/dL Final    Sodium 11/18/2020 136  136 - 145 mmol/L Final    Potassium 11/18/2020 5.2* 3.5 - 5.1 mmol/L Final    Chloride 11/18/2020 99  99 - 110 mmol/L Final    CO2 11/18/2020 22  21 - 32 mmol/L Final    Anion Gap 11/18/2020 15  3 - 16 Final    Glucose 11/18/2020 138* 70 - 99 mg/dL Final    BUN 11/18/2020 27* 7 - 20 mg/dL Final    CREATININE 11/18/2020 0.8  0.6 - 1.1 mg/dL Final    GFR Non- 11/18/2020 >60  >60 Final    GFR  11/18/2020 >60  >60 Final    Calcium 11/18/2020 9.6  8.3 - 10.6 mg/dL Final    Total Protein 11/18/2020 7.2  6.4 - 8.2 g/dL Final    Albumin 11/18/2020 4.3  3.4 - 5.0 g/dL Final    Albumin/Globulin Ratio 11/18/2020 1.5  1.1 - 2.2 Final    Total Bilirubin 11/18/2020 <0.2  0.0 - 1.0 mg/dL Final    Alkaline Phosphatase 11/18/2020 145* 40 - 129 U/L Final    ALT 11/18/2020 11  10 - 40 U/L Final    AST 11/18/2020 11* 15 - 37 U/L Final    Globulin 11/18/2020 2.9  g/dL Final    Iron 11/18/2020 49  37 - 145 ug/dL Final    TIBC 11/18/2020 454* 260 - 445 ug/dL Final    Iron Saturation 11/18/2020 11* 15 - 50 % Final    Ferritin 11/18/2020 13.3* 15.0 - 150.0 ng/mL Final    Vitamin B-12 11/18/2020 573  211 - 911 pg/mL Final    Folate 11/18/2020 6.09  4.78 - 24.20 ng/mL Final    WBC 11/18/2020 7.7  4.0 - 11.0 K/uL Final    RBC 11/18/2020 4.33  4.00 - 5.20 M/uL Final    Hemoglobin 11/18/2020 10.5* 12.0 - 16.0 g/dL Final    Hematocrit 11/18/2020 33.2* 36.0 - 48.0 % Final    MCV 11/18/2020 76.8* 80.0 - 100.0 fL Final    MCH 11/18/2020 24.4* 26.0 - 34.0 pg Final    MCHC 11/18/2020 31.7  31.0 - 36.0 g/dL Final    RDW 11/18/2020 17.9* 12.4 - 15.4 % Final    Platelets 15/53/2479 347  135 - 450 K/uL Final    MPV 11/18/2020 9.0  5.0 - 10.5 fL Final    Neutrophils % 11/18/2020 66.3  % Final    Lymphocytes % 11/18/2020 20.3  % Final    Monocytes % 11/18/2020 5.2  % Final    Eosinophils % 11/18/2020 7.3  % Final    Basophils % 11/18/2020 0.9  % Final    Neutrophils Absolute 11/18/2020 5.1  1.7 - 7.7 K/uL Final    Lymphocytes Absolute 11/18/2020 1.6  1.0 - 5.1 K/uL Final    Monocytes Absolute 11/18/2020 0.4  0.0 - 1.3 K/uL Final    Eosinophils Absolute 11/18/2020 0.6  0.0 - 0.6 K/uL Final    Basophils Absolute 11/18/2020 0.1  0.0 - 0.2 K/uL Final    hCG Quant 11/18/2020 <5.0  <5.0 mIU/mL Final           Assessment and Plan     There are no diagnoses linked to this encounter. 1: Type 2 DM complicated with nephropathy, retinopathy   Uncontrolled A1C 8.4% <  9% <  8.1% < 9.4% < 8.9% < 8.8% < 8.9 % < 8.7% < 9.7%     A1C is unreliable with iron deficiency anemia   Sister and mother also have anemia     Fructosamine 383 eq to A1C of 9.6%. consistent with A1C     Hb electrophoresis normal May 2021   Blood sugars are high   She needs to check more frequent blood sugars , 3 x per day     She does not want to take inusulin although indicated   She wants to switch Trulicity to Ozempic 1 mg weekly     C/w    Metformin ER 500mg, two tabs twice a day   Actos 15 mg daily   Jardiance 25mg daily. She is still getting yeast infection. Recommend stopping it. For UTI see PCP     If blood sugars are still high start DAVIS     All instructions provided in written. Check Blood sugars 3 times per day.  Log them along with insulin and send them every 2 weeks. Call for blood sugars less than 60 or more than 400. Eye exam: Last exam in July 2020.  present, stable. She will need surgery for cataracts. Foot exam:  Sep 2020. dystrophic nail with fungal infection of tos, extending to skin on left foot   Deformity/amputation: absent  Skin lesions/pre-ulcerative calluses: absent  Edema: right- negative, left- negative  Sensory exam: Monofilament sensation: normal  Pulses: normal, Vibration (128 Hz): moderately impaired     Renal screen: Normal May 2021     TSH screen: Nov 2017     2: HTN   Controlled at home     3: Hyperlipidemia   LDL: 65, HDL: 69, TGs: 76 - May 2021     Crestor 10mg, every other day. Vit D Def: 27 - Aug 2021   C/w ergocalciferol 50k units weekly     4: Hirsutism, since 20's   On face only   Runs in the family   Gonadotropins suggest pre menopause status   Free T high 5.8 (N 0.6-3.8), TT normal 24 July 2018   Free T 4.0 high June 2020. Spironolactone 50mg twice daily, this dose is helping on facial hair. She thinks she it has helped. 5: Morbid obesity   Calorie target <1300 per day   Need to work on diet, exercise and lose weight   Lifestyle changes failed for meaningful weight loss   Belviq 10mg bid , was stared in May 2019,- stopped due to recall     Labs before next visit: A1C   RTC in 3 months      EDUCATION:   Greater than 50% of this follow-up visit was spent in general counseling regarding   obesity, diet, exercise, importance of adherence to insulin regime, recognition and treatment of hypo and hyperglycemia,  glucose logging, proper diabetes management, diabetic complications with poor management and the importance of glycemic control in order to avoid the complications of diabetes. Risks and potential complications of diabetes were reviewed with the patient. Diabetes health maintenance plan and follow-up were discussed and understood by the patient.   We reviewed the importance of medication compliance and regular follow-up. Aggressive lifestyle modification was encouraged. Exercise Counselling: This patient is a candidate for regular physical exercise. Instructions to perform the following types of exercise:  Swimming or water aerobic exercise  Brisk walking  Playing tennis  Stationary bicycle or elliptical indoor  Low impact aerobic exercise    Instructions given to exercise for the following duration:  30 minutes a day for five-seven days per week.     Following instructions for being active throughout the day in addition to formal exercise:  Walk instead of drive whenever possible  Take the stairs instead of the elevator  Work in the garden  Park to the far end of the parking lot to add more walking steps to destination      Electronically signed by Teressa Richardson MD on 8/30/2021 at 4:23 PM

## 2021-09-04 DIAGNOSIS — Z79.4 TYPE 2 DIABETES MELLITUS WITHOUT COMPLICATION, WITH LONG-TERM CURRENT USE OF INSULIN (HCC): ICD-10-CM

## 2021-09-04 DIAGNOSIS — E11.9 TYPE 2 DIABETES MELLITUS WITHOUT COMPLICATION, WITH LONG-TERM CURRENT USE OF INSULIN (HCC): ICD-10-CM

## 2021-09-04 DIAGNOSIS — E11.3299 TYPE 2 DIABETES MELLITUS WITH BACKGROUND RETINOPATHY (HCC): ICD-10-CM

## 2021-09-07 ENCOUNTER — TELEPHONE (OUTPATIENT)
Dept: ENDOCRINOLOGY | Age: 56
End: 2021-09-07

## 2021-09-07 RX ORDER — METFORMIN HYDROCHLORIDE 500 MG/1
TABLET, EXTENDED RELEASE ORAL
Qty: 120 TABLET | Refills: 5 | Status: SHIPPED | OUTPATIENT
Start: 2021-09-07 | End: 2022-05-16

## 2021-09-07 NOTE — TELEPHONE ENCOUNTER
NOV- 11/2021      Requested Prescriptions     Pending Prescriptions Disp Refills    metFORMIN (GLUCOPHAGE-XR) 500 MG extended release tablet [Pharmacy Med Name: METFORMIN HCL  MG TABLET] 120 tablet 5     Sig: TAKE 2 TABLETS BY MOUTH TWICE A DAY WITH MEALS

## 2021-09-08 NOTE — TELEPHONE ENCOUNTER
Not sure about generic   She can check with insurance of they will cover [place] or dexcom and will be happy to send

## 2021-09-10 ENCOUNTER — OFFICE VISIT (OUTPATIENT)
Dept: INTERNAL MEDICINE CLINIC | Age: 56
End: 2021-09-10
Payer: COMMERCIAL

## 2021-09-10 VITALS
HEART RATE: 96 BPM | OXYGEN SATURATION: 98 % | DIASTOLIC BLOOD PRESSURE: 60 MMHG | WEIGHT: 232 LBS | BODY MASS INDEX: 36.34 KG/M2 | SYSTOLIC BLOOD PRESSURE: 110 MMHG

## 2021-09-10 DIAGNOSIS — E11.65 TYPE 2 DIABETES MELLITUS WITH HYPERGLYCEMIA, WITHOUT LONG-TERM CURRENT USE OF INSULIN (HCC): ICD-10-CM

## 2021-09-10 DIAGNOSIS — Z23 NEED FOR INFLUENZA VACCINATION: ICD-10-CM

## 2021-09-10 DIAGNOSIS — K59.09 CHRONIC CONSTIPATION: ICD-10-CM

## 2021-09-10 DIAGNOSIS — M54.50 CHRONIC LEFT-SIDED LOW BACK PAIN WITHOUT SCIATICA: ICD-10-CM

## 2021-09-10 DIAGNOSIS — G89.29 CHRONIC LEFT-SIDED LOW BACK PAIN WITHOUT SCIATICA: ICD-10-CM

## 2021-09-10 DIAGNOSIS — I10 ESSENTIAL HYPERTENSION: Primary | ICD-10-CM

## 2021-09-10 PROCEDURE — G8427 DOCREV CUR MEDS BY ELIG CLIN: HCPCS | Performed by: INTERNAL MEDICINE

## 2021-09-10 PROCEDURE — 90471 IMMUNIZATION ADMIN: CPT | Performed by: INTERNAL MEDICINE

## 2021-09-10 PROCEDURE — 1036F TOBACCO NON-USER: CPT | Performed by: INTERNAL MEDICINE

## 2021-09-10 PROCEDURE — 3052F HG A1C>EQUAL 8.0%<EQUAL 9.0%: CPT | Performed by: INTERNAL MEDICINE

## 2021-09-10 PROCEDURE — 3017F COLORECTAL CA SCREEN DOC REV: CPT | Performed by: INTERNAL MEDICINE

## 2021-09-10 PROCEDURE — G8417 CALC BMI ABV UP PARAM F/U: HCPCS | Performed by: INTERNAL MEDICINE

## 2021-09-10 PROCEDURE — 90674 CCIIV4 VAC NO PRSV 0.5 ML IM: CPT | Performed by: INTERNAL MEDICINE

## 2021-09-10 PROCEDURE — 2022F DILAT RTA XM EVC RTNOPTHY: CPT | Performed by: INTERNAL MEDICINE

## 2021-09-10 PROCEDURE — 99214 OFFICE O/P EST MOD 30 MIN: CPT | Performed by: INTERNAL MEDICINE

## 2021-09-10 RX ORDER — DOCUSATE SODIUM 100 MG/1
100 CAPSULE, LIQUID FILLED ORAL 2 TIMES DAILY PRN
Qty: 50 CAPSULE | Refills: 1 | Status: CANCELLED | OUTPATIENT
Start: 2021-09-10

## 2021-09-10 RX ORDER — DOCUSATE SODIUM 100 MG/1
100 CAPSULE, LIQUID FILLED ORAL 2 TIMES DAILY PRN
Qty: 50 CAPSULE | Refills: 1 | Status: SHIPPED | OUTPATIENT
Start: 2021-09-10

## 2021-09-10 SDOH — ECONOMIC STABILITY: FOOD INSECURITY: WITHIN THE PAST 12 MONTHS, THE FOOD YOU BOUGHT JUST DIDN'T LAST AND YOU DIDN'T HAVE MONEY TO GET MORE.: NEVER TRUE

## 2021-09-10 SDOH — ECONOMIC STABILITY: FOOD INSECURITY: WITHIN THE PAST 12 MONTHS, YOU WORRIED THAT YOUR FOOD WOULD RUN OUT BEFORE YOU GOT MONEY TO BUY MORE.: NEVER TRUE

## 2021-09-10 ASSESSMENT — SOCIAL DETERMINANTS OF HEALTH (SDOH): HOW HARD IS IT FOR YOU TO PAY FOR THE VERY BASICS LIKE FOOD, HOUSING, MEDICAL CARE, AND HEATING?: NOT HARD AT ALL

## 2021-09-10 NOTE — PATIENT INSTRUCTIONS
self-massage to unwind after work or school or to energize yourself in the morning. You can easily massage your feet, hands, or neck. Self-massage works best if you are in comfortable clothes and are sitting or lying in a comfortable position. Use oil or lotion to massage bare skin. · Reduce stress. Back pain can lead to a vicious [place]: Distress about the pain tenses the muscles in your back, which in turn causes more pain. Learn how to relax your mind and your muscles to lower your stress. Where can you learn more? Go to https://[employer]iceweb.[employer]. org and sign in to your [employer] account. Enter A013 in the NavPrescience box to learn more about \"Learning About Relief for Back Pain. \"     If you do not have an account, please click on the \"Sign Up Now\" link. Current as of: November 16, 2020               Content Version: 12.9  © 2006-2021 [employer]. Care instructions adapted under license by [facility] ([facility] [place]). If you have questions about a medical condition or this instruction, always ask your healthcare professional. [place] [address] any warranty or liability for your use of this information. Patient Education        Back Stretches: Exercises  Introduction  Here are some examples of exercises for stretching your back. Start each exercise slowly. Ease off the exercise if you start to have pain. Your doctor or physical therapist will tell you when you can start these exercises and which ones will work best for you. How to do the exercises  Overhead stretch   1. Stand comfortably with your feet shoulder-width apart. 2. Looking straight ahead, raise both arms over your head and reach toward the ceiling. Do not allow your head to tilt back. 3. Hold for 15 to 30 seconds, then lower your arms to your sides. 4. Repeat 2 to 4 times. Side stretch   1. Stand comfortably with your feet shoulder-width apart.   2. Raise one arm over your head, and then lean to the other side. 3. Slide your hand down your leg as you let the weight of your arm gently stretch your side muscles. Hold for 15 to 30 seconds. 4. Repeat 2 to 4 times on each side. Press-up   1. Lie on your stomach, supporting your body with your forearms. 2. Press your elbows down into the floor to raise your upper back. As you do this, relax your stomach muscles and allow your back to arch without using your back muscles. As your press up, do not let your hips or pelvis come off the floor. 3. Hold for 15 to 30 seconds, then relax. 4. Repeat 2 to 4 times. Relax and rest   1. Lie on your back with a rolled towel under your neck and a pillow under your knees. Extend your arms comfortably to your sides. 2. Relax and breathe normally. 3. Remain in this position for about 10 minutes. 4. If you can, do this 2 or 3 times each day. Follow-up care is a key part of your treatment and safety. Be sure to make and go to all appointments, and call your doctor if you are having problems. It's also a good idea to know your test results and keep a list of the medicines you take. Where can you learn more? Go to https://[employer]."[employer],". org and sign in to your [employer] account. Enter U694 in the [employer] box to learn more about \"Back Stretches: Exercises. \"     If you do not have an account, please click on the \"Sign Up Now\" link. Current as of: November 16, 2020               Content Version: 12.9  © 2006-2021 [employer]. Care instructions adapted under license by [facility] ([facility]). If you have questions about a medical condition or this instruction, always ask your healthcare professional. [place] [address] any warranty or liability for your use of this information.

## 2021-09-10 NOTE — PROGRESS NOTES
Tosin ABRAMS Ember (:  1965)     ASSESSMENT/PLAN:   Essential hypertension  Assessment & Plan:   Well-controlled, continue current medications  Type 2 diabetes mellitus with hyperglycemia, without long-term current use of insulin [facility])  Assessment & Plan:  Managed by Dr. Debrah Najjar, endocrine, slight improvement with most recent labs. Chronic left-sided low back pain without sciatica  Assessment & Plan:  Musculoskeletal, with no red flags. Persistent greater than 6 months. Refer to physical therapy. Orders:  -     [facility] PT - [facility]  Chronic constipation  Assessment & Plan:  Ongoing issue. Did best with Amitiza but is not covered. Did not tolerate Linzess. Continue with fiber, water, and mag citrate as needed. Patient will call with name of the other medication pharmacist suggested that appeared to be covered. Need for influenza vaccination  -     INFLUENZA, MDCK QUADV, 2 YRS AND OLDER, IM, PF, PREFILL SYR OR SDV, 0.5ML (FLUCELVAX QUADV, PF)      Return in about 6 months (around 3/10/2022) for CPE. SUBJECTIVE:  55 y.o. female established patient here for:   Chief Complaint   Patient presents with    Follow-up     Low back  pain, into hips and left leg. Started last winter. Wonders if related to digestion or gas, but not related to eating. Is related more to movement. Always worse in am, and eases over day. Uses advil occasionally. Gets total of about 30 minute exercise daily. Does constipation issue. Uses fiber gummies and magnesium prn. Used [employer] which worked well. Linzess cramoped her up. CPR Red Flags:Denies  history of cancer, night sweats, fevers, saddle anesthesia and new bowel or bladder dysfunction.       Review of Systems  Outpatient Medications Marked as Taking for the 9/10/21 encounter (Office Visit) with Ronald House MD   Medication Sig Dispense Refill    docusate sodium (COLACE) 100 MG capsule Take 1 capsule by mouth 2 times daily as needed for Constipation 50 capsule 1    metFORMIN (GLUCOPHAGE-XR) 500 MG extended release tablet TAKE 2 TABLETS BY MOUTH TWICE A DAY WITH MEALS 120 tablet 5    Psyllium (METAMUCIL FIBER PO) Take by mouth      Semaglutide, 1 MG/DOSE, (OZEMPIC, 1 MG/DOSE,) 4 MG/3ML SOPN Inject 1 mg into the skin once a week 3 mL 3    spironolactone (ALDACTONE) 50 MG tablet TAKE 1 TABLET BY MOUTH TWICE A  tablet 3    ergocalciferol (DRISDOL) 1.25 MG (44980 UT) capsule Take 1 capsule by mouth once a week 12 capsule 1    losartan (COZAAR) 100 MG tablet TAKE 1 TABLET BY MOUTH EVERY DAY 30 tablet 5    hydroCHLOROthiazide (HYDRODIURIL) 25 MG tablet TAKE 1 TABLET BY MOUTH EVERY DAY 30 tablet 5    omeprazole (PRILOSEC) 40 MG delayed release capsule Take 1 capsule by mouth daily 30 capsule 3    pioglitazone (ACTOS) 15 MG tablet TAKE 1 TABLET BY MOUTH EVERY DAY 30 tablet 5    ASPIRIN LOW DOSE 81 MG EC tablet TAKE 1 TABLET BY MOUTH EVERY DAY 30 tablet 11    JARDIANCE 25 MG tablet TAKE 1 TABLET BY MOUTH EVERY DAY (Patient taking differently: Take 12.5 mg by mouth 2 times daily ) 30 tablet 5    rosuvastatin (CRESTOR) 10 MG tablet TAKE 1 TABLET BY MOUTH EVERY DAY IN THE EVENING (Patient taking differently: every other day ) 30 tablet 8    UNABLE TO FIND OTC Radha chewable      fluticasone (FLONASE) 50 MCG/ACT nasal spray SPRAY 1 SPRAY INTO EACH NOSTRIL EVERY DAY (Patient taking differently: 1 spray by Nasal route daily ) 1 Bottle 5    MAGNESIUM CITRATE PO Take by mouth OTC      fexofenadine-pseudoephedrine (ALLEGRA-D 24HR) 180-240 MG per extended release tablet Take 1 tablet by mouth daily (Patient taking differently: Take 1 tablet by mouth as needed ) 10 tablet 0    nystatin (MYCOSTATIN) 332760 UNIT/GM powder Apply topically 2 times daily 1 Bottle 2    celecoxib (CELEBREX) 200 MG capsule TAKE 1 CAPSULE BY MOUTH EVERY DAY 30 capsule 2    B-D ULTRAFINE III SHORT PEN 31G X 8 MM MISC USE AS DIRECTED TO INJECT INSULIN EVERY DAY  3 OBJECTIVE:  Vitals:    09/10/21 1425   BP: 110/60   Pulse: 96   SpO2: 98%   Weight: 232 lb (105.2 kg)     Physical Exam  Constitutional:       Appearance: Normal appearance. Cardiovascular:      Rate and Rhythm: Normal rate and regular rhythm. Pulmonary:      Effort: Pulmonary effort is normal.      Breath sounds: Normal breath sounds. Musculoskeletal:      Lumbar back: Tenderness (Left lumbosacral region) present. No bony tenderness. Negative right straight leg raise test and negative left straight leg raise test.      Right lower leg: No edema. Left lower leg: No edema. Comments: Reduced patellar DTRs laterally. Normal lower extremity strength. This note was generated completely or in part utilizing Dragon dictation speech recognition software. Occasionally, words are mistranscribed and despite editing, the text may contain inaccuracies due to incorrect word recognition.   If further clarification is needed please contact the office at [phone]    --Zeenat Sánchez MD

## 2021-09-10 NOTE — ASSESSMENT & PLAN NOTE
Ongoing issue. Did best with Amitiza but is not covered. Did not tolerate Linzess. Continue with fiber, water, and mag citrate as needed. Patient will call with name of the other medication pharmacist suggested that appeared to be covered.

## 2021-09-14 ENCOUNTER — TELEPHONE (OUTPATIENT)
Dept: INTERNAL MEDICINE CLINIC | Age: 56
End: 2021-09-14

## 2021-09-14 RX ORDER — LACTULOSE 10 G/15ML
5-10 SOLUTION ORAL NIGHTLY PRN
Qty: 437 ML | Refills: 0 | Status: SHIPPED | OUTPATIENT
Start: 2021-09-14 | End: 2022-01-02

## 2021-09-14 NOTE — TELEPHONE ENCOUNTER
Patient is calling to provide the medication her insurance does cover. Lactulose Solution     [facility]/pharmacy #[id number]- [place], [place] - [address] - P [phone] - F [phone]    Patient provided no additional information.

## 2021-10-15 ENCOUNTER — CLINICAL DOCUMENTATION (OUTPATIENT)
Dept: OTHER | Age: 56
End: 2021-10-15

## 2021-12-05 DIAGNOSIS — E11.9 TYPE 2 DIABETES MELLITUS WITHOUT COMPLICATION, WITH LONG-TERM CURRENT USE OF INSULIN (HCC): ICD-10-CM

## 2021-12-05 DIAGNOSIS — E11.21 TYPE 2 DIABETES MELLITUS WITH DIABETIC NEPHROPATHY, WITH LONG-TERM CURRENT USE OF INSULIN (HCC): ICD-10-CM

## 2021-12-05 DIAGNOSIS — Z79.4 TYPE 2 DIABETES MELLITUS WITHOUT COMPLICATION, WITH LONG-TERM CURRENT USE OF INSULIN (HCC): ICD-10-CM

## 2021-12-05 DIAGNOSIS — E11.3299 TYPE 2 DIABETES MELLITUS WITH BACKGROUND RETINOPATHY (HCC): ICD-10-CM

## 2021-12-05 DIAGNOSIS — Z79.4 TYPE 2 DIABETES MELLITUS WITH DIABETIC NEPHROPATHY, WITH LONG-TERM CURRENT USE OF INSULIN (HCC): ICD-10-CM

## 2021-12-06 NOTE — TELEPHONE ENCOUNTER
Requested Prescriptions     Pending Prescriptions Disp Refills    JARDIANCE 25 MG tablet [Pharmacy Med Name: Devaughn Forte 25 MG TABLET] 30 tablet 0     Sig: TAKE 1 TABLET BY MOUTH EVERY DAY     Last refilled:11/4/2021  Last seen: 8/30/2021  Follow up: 12/13/2021

## 2021-12-07 RX ORDER — EMPAGLIFLOZIN 25 MG/1
TABLET, FILM COATED ORAL
Qty: 30 TABLET | Refills: 0 | Status: SHIPPED | OUTPATIENT
Start: 2021-12-07 | End: 2022-01-03

## 2021-12-09 RX ORDER — OMEPRAZOLE 40 MG/1
CAPSULE, DELAYED RELEASE ORAL
Qty: 30 CAPSULE | Refills: 3 | Status: SHIPPED | OUTPATIENT
Start: 2021-12-09

## 2021-12-30 DIAGNOSIS — Z79.4 TYPE 2 DIABETES MELLITUS WITHOUT COMPLICATION, WITH LONG-TERM CURRENT USE OF INSULIN (HCC): ICD-10-CM

## 2021-12-30 DIAGNOSIS — Z79.4 TYPE 2 DIABETES MELLITUS WITHOUT COMPLICATION, WITH LONG-TERM CURRENT USE OF INSULIN (HCC): Primary | ICD-10-CM

## 2021-12-30 DIAGNOSIS — E11.9 TYPE 2 DIABETES MELLITUS WITHOUT COMPLICATION, WITH LONG-TERM CURRENT USE OF INSULIN (HCC): Primary | ICD-10-CM

## 2021-12-30 DIAGNOSIS — E11.3299 TYPE 2 DIABETES MELLITUS WITH BACKGROUND RETINOPATHY (HCC): ICD-10-CM

## 2021-12-30 DIAGNOSIS — E11.9 TYPE 2 DIABETES MELLITUS WITHOUT COMPLICATION, WITH LONG-TERM CURRENT USE OF INSULIN (HCC): ICD-10-CM

## 2021-12-30 RX ORDER — FLASH GLUCOSE SENSOR
KIT MISCELLANEOUS
Qty: 2 EACH | Refills: 3 | Status: SHIPPED | OUTPATIENT
Start: 2021-12-30 | End: 2022-01-14

## 2021-12-30 RX ORDER — FLASH GLUCOSE SCANNING READER
EACH MISCELLANEOUS
Qty: 1 EACH | Refills: 0 | Status: SHIPPED | OUTPATIENT
Start: 2021-12-30 | End: 2022-01-14

## 2021-12-30 NOTE — TELEPHONE ENCOUNTER
Pt requests to start freestyle preet 2 system asap     Please send rx    Pt also requests set up through [facility] [phone]    Requested Prescriptions     Pending Prescriptions Disp Refills    Continuous Blood Gluc Sensor (FREESTYLE PREET 2 SENSOR) MISC 2 each 3     Sig: Change Sensor Q 14 Days    Continuous Blood Gluc  (FREESTYLE PREET 2 READER) MARTI 1 each 0     Sig: Use Cayce in conjunction w/ Sensor to monitor FSBS

## 2022-01-02 RX ORDER — LACTULOSE 10 G/15ML
5-10 SOLUTION ORAL NIGHTLY PRN
Qty: 473 ML | Refills: 2 | Status: SHIPPED | OUTPATIENT
Start: 2022-01-02

## 2022-01-03 DIAGNOSIS — Z79.4 TYPE 2 DIABETES MELLITUS WITHOUT COMPLICATION, WITH LONG-TERM CURRENT USE OF INSULIN (HCC): ICD-10-CM

## 2022-01-03 DIAGNOSIS — E11.3299 TYPE 2 DIABETES MELLITUS WITH BACKGROUND RETINOPATHY (HCC): ICD-10-CM

## 2022-01-03 DIAGNOSIS — E11.9 TYPE 2 DIABETES MELLITUS WITHOUT COMPLICATION, WITH LONG-TERM CURRENT USE OF INSULIN (HCC): ICD-10-CM

## 2022-01-03 DIAGNOSIS — Z79.4 TYPE 2 DIABETES MELLITUS WITH DIABETIC NEPHROPATHY, WITH LONG-TERM CURRENT USE OF INSULIN (HCC): ICD-10-CM

## 2022-01-03 DIAGNOSIS — E11.21 TYPE 2 DIABETES MELLITUS WITH DIABETIC NEPHROPATHY, WITH LONG-TERM CURRENT USE OF INSULIN (HCC): ICD-10-CM

## 2022-01-03 RX ORDER — DULAGLUTIDE 3 MG/.5ML
INJECTION, SOLUTION SUBCUTANEOUS
Refills: 5 | OUTPATIENT
Start: 2022-01-03

## 2022-01-03 RX ORDER — EMPAGLIFLOZIN 25 MG/1
TABLET, FILM COATED ORAL
Qty: 30 TABLET | Refills: 0 | Status: SHIPPED | OUTPATIENT
Start: 2022-01-03 | End: 2022-02-07

## 2022-01-03 NOTE — TELEPHONE ENCOUNTER
Requested Prescriptions     Pending Prescriptions Disp Refills    [facility]9.[facility] [Pharmacy Med Name: [facility] 3 AI/6.5 ML PEN]  5     Sig: INJECT 1 PEN (3 MG) INTO THE SKIN ONCE A WEEK     Changed to Ozempic

## 2022-01-03 NOTE — TELEPHONE ENCOUNTER
Requested Prescriptions     Pending Prescriptions Disp Refills    JARDIANCE 25 MG tablet [Pharmacy Med Name: Carlos Hernandez 25 MG TABLET] 30 tablet 0     Sig: TAKE 1 TABLET BY MOUTH EVERY DAY     Last refilled:12/7/2021  Last seen: 8/30/2021  Follow up: 1/10/2022    Pt has cancelled last two appointments

## 2022-01-06 DIAGNOSIS — E11.9 TYPE 2 DIABETES MELLITUS WITHOUT COMPLICATION (HCC): ICD-10-CM

## 2022-01-06 DIAGNOSIS — E11.9 TYPE 2 DIABETES MELLITUS WITHOUT COMPLICATION, WITH LONG-TERM CURRENT USE OF INSULIN (HCC): ICD-10-CM

## 2022-01-06 DIAGNOSIS — E11.21 TYPE 2 DIABETES MELLITUS WITH DIABETIC NEPHROPATHY, WITH LONG-TERM CURRENT USE OF INSULIN (HCC): ICD-10-CM

## 2022-01-06 DIAGNOSIS — L68.0 HIRSUTISM: ICD-10-CM

## 2022-01-06 DIAGNOSIS — Z79.4 TYPE 2 DIABETES MELLITUS WITH DIABETIC NEPHROPATHY, WITH LONG-TERM CURRENT USE OF INSULIN (HCC): ICD-10-CM

## 2022-01-06 DIAGNOSIS — E11.3299 TYPE 2 DIABETES MELLITUS WITH BACKGROUND RETINOPATHY (HCC): ICD-10-CM

## 2022-01-06 DIAGNOSIS — Z79.4 TYPE 2 DIABETES MELLITUS WITHOUT COMPLICATION, WITH LONG-TERM CURRENT USE OF INSULIN (HCC): ICD-10-CM

## 2022-01-06 RX ORDER — ROSUVASTATIN CALCIUM 10 MG/1
TABLET, COATED ORAL
Qty: 30 TABLET | Refills: 8 | Status: SHIPPED | OUTPATIENT
Start: 2022-01-06 | End: 2022-01-10 | Stop reason: SINTOL

## 2022-01-06 RX ORDER — PIOGLITAZONEHYDROCHLORIDE 15 MG/1
TABLET ORAL
Qty: 30 TABLET | Refills: 5 | Status: SHIPPED | OUTPATIENT
Start: 2022-01-06

## 2022-01-06 NOTE — TELEPHONE ENCOUNTER
Requested Prescriptions     Pending Prescriptions Disp Refills    rosuvastatin (CRESTOR) 10 MG tablet [Pharmacy Med Name: ROSUVASTATIN CALCIUM 10 MG TAB] 30 tablet 8     Sig: TAKE 1 TABLET BY MOUTH EVERY DAY IN THE EVENING    pioglitazone (ACTOS) 15 MG tablet [Pharmacy Med Name: PIOGLITAZONE HCL 15 MG TABLET] 30 tablet 5     Sig: TAKE 1 TABLET BY MOUTH EVERY DAY     Last refilled:Rosuvastatin 4/8/2021  Last refilled: Actos 7/7/2021  Last seen: 8/30/2021  Follow up: 1/10/2022

## 2022-01-07 DIAGNOSIS — Z79.4 TYPE 2 DIABETES MELLITUS WITH DIABETIC NEPHROPATHY, WITH LONG-TERM CURRENT USE OF INSULIN (HCC): ICD-10-CM

## 2022-01-07 DIAGNOSIS — E11.9 TYPE 2 DIABETES MELLITUS WITHOUT COMPLICATION, WITH LONG-TERM CURRENT USE OF INSULIN (HCC): ICD-10-CM

## 2022-01-07 DIAGNOSIS — Z79.4 TYPE 2 DIABETES MELLITUS WITHOUT COMPLICATION, WITH LONG-TERM CURRENT USE OF INSULIN (HCC): ICD-10-CM

## 2022-01-07 DIAGNOSIS — I10 ESSENTIAL HYPERTENSION: ICD-10-CM

## 2022-01-07 DIAGNOSIS — E11.21 TYPE 2 DIABETES MELLITUS WITH DIABETIC NEPHROPATHY, WITH LONG-TERM CURRENT USE OF INSULIN (HCC): ICD-10-CM

## 2022-01-07 DIAGNOSIS — E11.69 TYPE 2 DIABETES MELLITUS WITH OTHER SPECIFIED COMPLICATION, WITH LONG-TERM CURRENT USE OF INSULIN (HCC): ICD-10-CM

## 2022-01-07 DIAGNOSIS — Z79.4 TYPE 2 DIABETES MELLITUS WITH OTHER SPECIFIED COMPLICATION, WITH LONG-TERM CURRENT USE OF INSULIN (HCC): ICD-10-CM

## 2022-01-08 LAB
ESTIMATED AVERAGE GLUCOSE: 157.1 MG/DL
HBA1C MFR BLD: 7.1 %

## 2022-01-10 ENCOUNTER — VIRTUAL VISIT (OUTPATIENT)
Dept: ENDOCRINOLOGY | Age: 57
End: 2022-01-10
Payer: COMMERCIAL

## 2022-01-10 DIAGNOSIS — E78.5 DYSLIPIDEMIA ASSOCIATED WITH TYPE 2 DIABETES MELLITUS (HCC): ICD-10-CM

## 2022-01-10 DIAGNOSIS — I10 ESSENTIAL HYPERTENSION: ICD-10-CM

## 2022-01-10 DIAGNOSIS — E11.21 TYPE 2 DIABETES MELLITUS WITH DIABETIC NEPHROPATHY, WITH LONG-TERM CURRENT USE OF INSULIN (HCC): Primary | ICD-10-CM

## 2022-01-10 DIAGNOSIS — Z79.4 TYPE 2 DIABETES MELLITUS WITH DIABETIC NEPHROPATHY, WITH LONG-TERM CURRENT USE OF INSULIN (HCC): Primary | ICD-10-CM

## 2022-01-10 DIAGNOSIS — U07.1 COVID: ICD-10-CM

## 2022-01-10 DIAGNOSIS — E11.69 DYSLIPIDEMIA ASSOCIATED WITH TYPE 2 DIABETES MELLITUS (HCC): ICD-10-CM

## 2022-01-10 DIAGNOSIS — E11.3299 TYPE 2 DIABETES MELLITUS WITH BACKGROUND RETINOPATHY (HCC): ICD-10-CM

## 2022-01-10 PROBLEM — E11.9 TYPE 2 DIABETES MELLITUS, WITHOUT LONG-TERM CURRENT USE OF INSULIN (HCC): Status: RESOLVED | Noted: 2018-04-16 | Resolved: 2022-01-10

## 2022-01-10 PROCEDURE — G8427 DOCREV CUR MEDS BY ELIG CLIN: HCPCS | Performed by: INTERNAL MEDICINE

## 2022-01-10 PROCEDURE — 3017F COLORECTAL CA SCREEN DOC REV: CPT | Performed by: INTERNAL MEDICINE

## 2022-01-10 PROCEDURE — 99214 OFFICE O/P EST MOD 30 MIN: CPT | Performed by: INTERNAL MEDICINE

## 2022-01-10 PROCEDURE — 3051F HG A1C>EQUAL 7.0%<8.0%: CPT | Performed by: INTERNAL MEDICINE

## 2022-01-10 PROCEDURE — 2022F DILAT RTA XM EVC RTNOPTHY: CPT | Performed by: INTERNAL MEDICINE

## 2022-01-10 NOTE — PROGRESS NOTES
Patient ID:   Linda Peña is a 55 y.o. female    Chief Complaint:   Linda Peña presents for an evaluation of Type 2 Diabetes Mellitus , Hyperlipidemia and hypertension. Pursuant to the emergency declaration under the [facility], [address] [place] [address] and the [employer] Act this visit was conducted after obtaining verbal consent, with the use of Doxy. me interactive audio and video telecommunications system that permits real time communication between the patient and provider to substitute for an in-person clinic visit to reduce the patient's risk of exposure to COVID-19 and provide necessary medical care. Because this was a Telehealth visit, evaluation of the following organ systems was limited: Vitals, Constitutional, EENT, Resp, CV, GI, , MS, Neuro, Skin. Heme. Lymph, Imm. Tosin C [place] was at home. Provider was at [facility] office. No one else was involved. The patient has also been advised to contact this office for worsening conditions or problems, and seek emergency medical treatment and/or call 911 if deemed necessary. Subjective:   Type 2 Diabetes Mellitus diagnosed at age 30-32. On insulin since Nov 2017. Invokana caused UTI in past.     She stopped [place] for cramping. She did not like Basalar or Lantus. She did not like Trulicity as much as Ozempic     Currently at home due to COVID infection. She did not have much symptoms. Metformin ER 500mg, two tabs daily. Actos 15 mg daily   Jardiance 25mg daily. She takes half in am and half at dinner. She likes it as it keeps sugars under control in am.  H/o UTI's. Doing well now. Ozempic 1 mg weekly on Saturdays     BS Reading have been running 110 -180  Down from [place] [address] blood sugars 0-1 times per day. 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 Occasional snacks (chips, pretzels). Diet pepsi. Exercise: Walk around the work during lunch break. Denies chest pain, exertional dyspnea. Family history of CAD: in multiple family members   Denies smoking/alcohol.    Currently on ASA 81 mg daily     The following portions of the patient's history were reviewed and updated as appropriate:       Family History   Problem Relation Age of Onset    Diabetes Mother     High Blood Pressure Mother     High Cholesterol Mother     Uterine Cancer Mother     High Blood Pressure Father     Other Father         cardiac amyloidosis    Heart Disease Sister     Diabetes Maternal Aunt     Stroke Maternal Aunt     Heart Disease Maternal Uncle     Cancer Maternal Uncle     Cancer Maternal Grandmother     Diabetes Maternal Grandfather     Diabetes Maternal Aunt     Stroke Maternal Aunt     Diabetes Maternal Aunt     Stroke Maternal Aunt     Heart Disease Maternal Aunt     No Known Problems Brother     No Known Problems Paternal Aunt     No Known Problems Paternal Uncle     No Known Problems Paternal Grandmother     No Known Problems Paternal Grandfather     No Known Problems Other     Rheum Arthritis Neg Hx     Osteoarthritis Neg Hx     Asthma Neg Hx     Breast Cancer Neg Hx     Heart Failure Neg Hx     Hypertension Neg Hx     Migraines Neg Hx     Ovarian Cancer Neg Hx     Rashes/Skin Problems Neg Hx     Seizures Neg Hx     Thyroid Disease Neg Hx          Social History     Socioeconomic History    Marital status: Single     Spouse name: Not on file    Number of children: Not on file    Years of education: Not on file    Highest education level: Not on file   Occupational History    Occupation:  for [employer]   Tobacco Use    Smoking status: Former Smoker     Packs/day: 0.25     Years: 10.00     Pack years: 2.50     Quit date: 2010     Years since quittin.0    Smokeless tobacco: Former User     Quit date: 6/6/2014    Tobacco comment: quit 2 years ago   Vaping Use    Vaping Use: Never used   Substance and Sexual Activity    Alcohol use: Yes     Alcohol/week: 0.0 standard drinks     Comment: socially    Drug use: No    Sexual activity: Not Currently     Partners: Male   Other Topics Concern    Not on file   Social History Narrative    Not on file     Social Determinants of Health     Financial Resource Strain: Low Risk     Difficulty of Paying Living Expenses: Not hard at all   Food Insecurity: No Food Insecurity    Worried About [address] in the Last Year: Never true    [facility] in the Last Year: Never true   Transportation Needs:     Lack of Transportation (Medical): Not on file    Lack of Transportation (Non-Medical):  Not on file   Physical Activity:     Days of Exercise per Week: Not on file    Minutes of Exercise per Session: Not on file   Stress:     Feeling of Stress : Not on file   Social Connections:     Frequency of Communication with Friends and Family: Not on file    Frequency of Social Gatherings with Friends and Family: Not on file    Attends Congregational Services: Not on file    Active Member of [address] [place] [address] or Organizations: Not on file    Attends Club or Organization Meetings: Not on file    Marital Status: Not on file   Intimate Partner Violence:     Fear of Current or Ex-Partner: Not on file    Emotionally Abused: Not on file    Physically Abused: Not on file    Sexually Abused: Not on file   Housing Stability:     Unable to Pay for Housing in the Last Year: Not on file    Number of [place] in the Last Year: Not on file    Unstable Housing in the Last Year: Not on file       Past Medical History:   Diagnosis Date    Arthritis of left acromioclavicular joint 6/19/2017    Cataract     Complete tear of left rotator cuff 6/19/2017    COVID     Essential hypertension 4/16/2018    Fibroid     Fibroids     uterine  Iron deficiency anemia 11/22/2020    Normal colonoscopy, mennorhagia    Iron deficiency anemia 11/22/2020    Rupture of biceps tendon 6/19/2017    Type II or unspecified type diabetes mellitus without mention of complication, not stated as uncontrolled     Urogenital trichomoniasis        Past Surgical History:   Procedure Laterality Date    CATARACT REMOVAL WITH IMPLANT      COLONOSCOPY  5/23/16    incomplete, Dr Amna Santacruz         No Known Allergies      Current Outpatient Medications:     pioglitazone (ACTOS) 15 MG tablet, TAKE 1 TABLET BY MOUTH EVERY DAY, Disp: 30 tablet, Rfl: 5    JARDIANCE 25 MG tablet, TAKE 1 TABLET BY MOUTH EVERY DAY, Disp: 30 tablet, Rfl: 0    lactulose (CHRONULAC) 10 GM/15ML solution, TAKE 8-15 MLS BY MOUTH NIGHTLY AS NEEDED (CONSITPATION), Disp: 473 mL, Rfl: 2    omeprazole (PRILOSEC) 40 MG delayed release capsule, TAKE 1 CAPSULE BY MOUTH EVERY DAY, Disp: 30 capsule, Rfl: 3    docusate sodium (COLACE) 100 MG capsule, Take 1 capsule by mouth 2 times daily as needed for Constipation, Disp: 50 capsule, Rfl: 1    metFORMIN (GLUCOPHAGE-XR) 500 MG extended release tablet, TAKE 2 TABLETS BY MOUTH TWICE A DAY WITH MEALS, Disp: 120 tablet, Rfl: 5    Psyllium (METAMUCIL FIBER PO), Take by mouth, Disp: , Rfl:     Semaglutide, 1 MG/DOSE, (OZEMPIC, 1 MG/DOSE,) 4 MG/3ML SOPN, Inject 1 mg into the skin once a week, Disp: 3 mL, Rfl: 3    spironolactone (ALDACTONE) 50 MG tablet, TAKE 1 TABLET BY MOUTH TWICE A DAY, Disp: 180 tablet, Rfl: 3    losartan (COZAAR) 100 MG tablet, TAKE 1 TABLET BY MOUTH EVERY DAY, Disp: 30 tablet, Rfl: 5    hydroCHLOROthiazide (HYDRODIURIL) 25 MG tablet, TAKE 1 TABLET BY MOUTH EVERY DAY, Disp: 30 tablet, Rfl: 5    ASPIRIN LOW DOSE 81 MG EC tablet, TAKE 1 TABLET BY MOUTH EVERY DAY, Disp: 30 tablet, Rfl: 11    fluticasone (FLONASE) 50 MCG/ACT nasal spray, SPRAY 1 SPRAY INTO EACH NOSTRIL EVERY DAY (Patient taking differently: 1 spray by Nasal route daily ), Disp: 1 Bottle, Rfl: 5    MAGNESIUM CITRATE PO, Take by mouth OTC, Disp: , Rfl:     fexofenadine-pseudoephedrine (ALLEGRA-D 24HR) 180-240 MG per extended release tablet, Take 1 tablet by mouth daily (Patient taking differently: Take 1 tablet by mouth as needed ), Disp: 10 tablet, Rfl: 0    celecoxib (CELEBREX) 200 MG capsule, TAKE 1 CAPSULE BY MOUTH EVERY DAY, Disp: 30 capsule, Rfl: 2    B-D ULTRAFINE III SHORT PEN 31G X 8 MM MISC, USE AS DIRECTED TO INJECT INSULIN EVERY DAY, Disp: , Rfl: 3    Continuous Blood Gluc Sensor (FREESTYLE PREET 2 SENSOR) MISC, Change Sensor Q 14 Days (Patient not taking: Reported on 1/10/2022), Disp: 2 each, Rfl: 3    Continuous Blood Gluc  (FREESTYLE PREET 2 READER) MARTI, Use [place] in conjunction w/ Sensor to monitor FSBS (Patient not taking: Reported on 1/10/2022), Disp: 1 each, Rfl: 0    ergocalciferol (DRISDOL) 1.25 MG (00414 UT) capsule, Take 1 capsule by mouth once a week, Disp: 12 capsule, Rfl: 1      Review of Systems:    Constitutional: Negative for fever, chills, and unexpected weight change. HENT: Negative for congestion, ear pain, rhinorrhea,  sore throat and trouble swallowing. Eyes: Negative for photophobia, redness, itching. Respiratory: Negative for cough, shortness of breath and sputum. Cardiovascular: Negative for chest pain, palpitations and leg swelling. Gastrointestinal: Negative for nausea, vomiting, abdominal pain, diarrhea, constipation. Endocrine: Negative for cold intolerance, heat intolerance, polydipsia, polyphagia and polyuria. Genitourinary: Negative for dysuria, urgency, frequency, hematuria and flank pain. Musculoskeletal: Negative for myalgias, back pain, arthralgias and neck pain. Skin/Nail: Negative for rash, itching. Normal nails. Neurological: Negative for seizures, weakness, light-headedness, numbness and headaches. Hematological/ Lymph nodes: Negative for adenopathy.  Does not bruise/bleed easily. Psychiatric/Behavioral: Negative for suicidal ideas, depression, anxiety, sleep disturbance and decreased concentration. Objective:   Physical Exam:  There were no vitals taken for this visit. Constitutional: Patient is oriented to person, place, and time. Patient appears well-developed and well-nourished. Pulmonary/Chest: Effort normal.   Neurological: Patient is alert and oriented to person, place, and time. Psychiatric: Patient has a normal mood and affect.  Patient behavior is normal.     Lab Review:    Orders Only on 01/07/2022   Component Date Value Ref Range Status    Hemoglobin A1C 01/07/2022 7.1  See comment % Final    eAG 01/07/2022 157.1  mg/dL Final   Orders Only on 09/07/2021   Component Date Value Ref Range Status    Visual Acuity Distance Right Eye 09/07/2021 20/20   Final    Visual Acuity Distance Left Eye 09/07/2021 20/25   Final    Intraocular Pressure Eye 09/07/2021    Final                    Value:16  14      Diabetic Retinopathy 09/07/2021 POSITIVE - MONITOR   Final    Cataracts 09/07/2021 NEGATIVE   Final    Glaucoma 09/07/2021 NEGATIVE   Final   Orders Only on 08/26/2021   Component Date Value Ref Range Status    Vit D, 25-Hydroxy 08/26/2021 30.7  >=30 ng/mL Final    Hemoglobin A1C 08/26/2021 8.4  See comment % Final    eAG 08/26/2021 194.4  mg/dL Final   Orders Only on 05/01/2021   Component Date Value Ref Range Status    Hemoglobin, Elp 05/01/2021 See Comment   Final    Fructosamine 05/01/2021 383* 170 - 285 umol/L Final    Magnesium 05/01/2021 2.00  1.80 - 2.40 mg/dL Final    Vit D, 25-Hydroxy 05/01/2021 24.0* >=30 ng/mL Final    Hemoglobin A1C 05/01/2021 9.0  See comment % Final    eAG 05/01/2021 211.6  mg/dL Final    Cholesterol, Fasting 05/01/2021 149  0 - 199 mg/dL Final    Triglyceride, Fasting 05/01/2021 76  0 - 150 mg/dL Final    HDL 05/01/2021 69* 40 - 60 mg/dL Final    LDL Calculated 05/01/2021 65  <100 mg/dL Final    VLDL Cholesterol Calculated 05/01/2021 15  Not Established mg/dL Final    Sodium 05/01/2021 134* 136 - 145 mmol/L Final    Potassium 05/01/2021 4.9  3.5 - 5.1 mmol/L Final    Chloride 05/01/2021 98* 99 - 110 mmol/L Final    CO2 05/01/2021 25  21 - 32 mmol/L Final    Anion Gap 05/01/2021 11  3 - 16 Final    Glucose 05/01/2021 215* 70 - 99 mg/dL Final    BUN 05/01/2021 32* 7 - 20 mg/dL Final    CREATININE 05/01/2021 0.9  0.6 - 1.1 mg/dL Final    GFR Non- 05/01/2021 >60  >60 Final    GFR  05/01/2021 >60  >60 Final    Calcium 05/01/2021 9.5  8.3 - 10.6 mg/dL Final    Total Protein 05/01/2021 7.2  6.4 - 8.2 g/dL Final    Albumin 05/01/2021 4.3  3.4 - 5.0 g/dL Final    Albumin/Globulin Ratio 05/01/2021 1.5  1.1 - 2.2 Final    Total Bilirubin 05/01/2021 0.3  0.0 - 1.0 mg/dL Final    Alkaline Phosphatase 05/01/2021 131* 40 - 129 U/L Final    ALT 05/01/2021 15  10 - 40 U/L Final    AST 05/01/2021 12* 15 - 37 U/L Final    Globulin 05/01/2021 2.9  g/dL Final    Microalbumin, Random Urine 05/01/2021 <1.20  <2.0 mg/dL Final    Creatinine, Ur 05/01/2021 48.2  28.0 - 259.0 mg/dL Final    Microalbumin Creatinine Ratio 05/01/2021 see below  0.0 - 30.0 mg/g Final    Interpretation + ELECTROPHORESIS, * 05/01/2021 REVIEWED   Final   Office Visit on 04/08/2021   Component Date Value Ref Range Status    Urine Culture, Routine 04/08/2021 <50,000 CFU/ml mixed skin/urogenital nathaniel.  No further workup   Final   Hospital Outpatient Visit on 04/01/2021   Component Date Value Ref Range Status    Visual Acuity Distance Right Eye 04/30/2021 20/70   Final    Visual Acuity Distance Left Eye 04/30/2021 20/40   Final    Intraocular Pressure Eye 04/30/2021    Final                    Value:15  17      Diabetic Retinopathy 04/30/2021 NEGATIVE   Final    Cataracts 04/30/2021 POSITIVE   Final    Glaucoma 04/30/2021 NEGATIVE   Final           Assessment and Plan     Tosin was seen today for diabetes. Diagnoses and all orders for this visit:    Type 2 diabetes mellitus with diabetic nephropathy, with long-term current use of insulin ([facility]  -     Hemoglobin A1C; Future  -     Lipid, Fasting; Future  -     Comprehensive Metabolic Panel; Future  -     Microalbumin / Creatinine Urine Ratio; Future  -     Vitamin D 25 Hydroxy; Future  -     TSH WITH REFLEX TO FT4; Future    COVID    Type 2 diabetes mellitus with background retinopathy ([facility].)  -     Hemoglobin A1C; Future  -     Lipid, Fasting; Future  -     Comprehensive Metabolic Panel; Future  -     Microalbumin / Creatinine Urine Ratio; Future  -     Vitamin D 25 Hydroxy; Future  -     TSH WITH REFLEX TO FT4; Future    Essential hypertension  -     Hemoglobin A1C; Future  -     Lipid, Fasting; Future  -     Comprehensive Metabolic Panel; Future  -     Microalbumin / Creatinine Urine Ratio; Future  -     Vitamin D 25 Hydroxy; Future  -     TSH WITH REFLEX TO FT4; Future    Dyslipidemia associated with type 2 diabetes mellitus ([facility]  -     Hemoglobin A1C; Future  -     Lipid, Fasting; Future  -     Comprehensive Metabolic Panel; Future  -     Microalbumin / Creatinine Urine Ratio; Future  -     Vitamin D 25 Hydroxy; Future  -     TSH WITH REFLEX TO FT4; Future         1: Type 2 DM complicated with nephropathy, retinopathy   Controlled A1C 7.1% < 8.4% <  9% <  8.1% < 9.4% < 8.9% < 8.8% < 8.9 % < 8.7% < 9.7%     A1C is unreliable with iron deficiency anemia   Sister and mother also have anemia     Fructosamine 383 eq to A1C of 9.6%. consistent with A1C     Hb electrophoresis normal May 2021   Blood sugars are high   She needs to check more frequent blood sugars , 3 x per day       Increase Metformin ER 500mg to two tabs twice a day     C/w   Actos 15 mg daily   Jardiance 25mg daily. Ozempic 1 mg weekly     She is still getting yeast infection. Recommend stopping it.  For UTI see PCP     If blood sugars are still high start DAIVS     All instructions provided in written. Check Blood sugars 3 times per day. Log them along with insulin and send them every 2 weeks. Call for blood sugars less than 60 or more than 400. Eye exam: Last exam in July 2020.  present, stable. She will need surgery for cataracts. Foot exam:  Sep 2020. dystrophic nail with fungal infection of tos, extending to skin on left foot   Deformity/amputation: absent  Skin lesions/pre-ulcerative calluses: absent  Edema: right- negative, left- negative  Sensory exam: Monofilament sensation: normal  Pulses: normal, Vibration (128 Hz): moderately impaired     Renal screen: Normal May 2021     TSH screen: Nov 2017     2: HTN   Controlled at home     3: Hyperlipidemia   LDL: 65, HDL: 69, TGs: 76 - May 2021     She stopped statins due to muscle cramps. She could not not tolerate Crestor 10mg, every other day. Recheck later and if Vit D > 50 start once weekly crestor     Vit D Def: 30 - Aug 2021   C/w ergocalciferol 50k units weekly     4: Hirsutism, since 20's   On face only   Runs in the family   Gonadotropins suggest pre menopause status   Free T high 5.8 (N 0.6-3.8), TT normal 24 July 2018   Free T 4.0 high June 2020. Spironolactone 50mg twice daily, this dose is helping on facial hair. She thinks she it has helped.       5: Morbid obesity   Calorie target <1300 per day   Need to work on diet, exercise and lose weight   Lifestyle changes failed for meaningful weight loss   Belviq 10mg bid , was stared in May 2019 - stopped due to recall     Labs before next visit: A1C, Vit D, lipids, TSH, MACR, CMP    RTC in 3 months      EDUCATION:   Greater than 50% of this follow-up visit was spent in general counseling regarding   obesity, diet, exercise, importance of adherence to insulin regime, recognition and treatment of hypo and hyperglycemia,  glucose logging, proper diabetes management, diabetic complications with poor management and the importance of glycemic control in order to avoid the complications of diabetes. Risks and potential complications of diabetes were reviewed with the patient. Diabetes health maintenance plan and follow-up were discussed and understood by the patient. We reviewed the importance of medication compliance and regular follow-up. Aggressive lifestyle modification was encouraged. Exercise Counselling: This patient is a candidate for regular physical exercise. Instructions to perform the following types of exercise:  Swimming or water aerobic exercise  Brisk walking  Playing tennis  Stationary bicycle or elliptical indoor  Low impact aerobic exercise    Instructions given to exercise for the following duration:  30 minutes a day for five-seven days per week.     Following instructions for being active throughout the day in addition to formal exercise:  Walk instead of drive whenever possible  Take the stairs instead of the elevator  Work in the garden  Park to the far end of the parking lot to add more walking steps to destination      Electronically signed by Suzy Amador MD on 1/10/2022 at 10:43 AM

## 2022-01-12 ENCOUNTER — TELEPHONE (OUTPATIENT)
Dept: INTERNAL MEDICINE CLINIC | Age: 57
End: 2022-01-12

## 2022-01-12 NOTE — TELEPHONE ENCOUNTER
Patient says she tested positive for Covid on Sunday. In order to return to work on 1-, she would need a Not from her Pcp. Patients says her symptoms are mild feeling like she has a head cold. But it has cleared up for the most part.

## 2022-01-12 NOTE — TELEPHONE ENCOUNTER
If don't rech in time for today, please schedule for virtual on Friday, use one of the earlier appts if possible.

## 2022-01-14 ENCOUNTER — TELEMEDICINE (OUTPATIENT)
Dept: INTERNAL MEDICINE CLINIC | Age: 57
End: 2022-01-14
Payer: COMMERCIAL

## 2022-01-14 DIAGNOSIS — U07.1 COVID-19 VIRUS INFECTION: Primary | ICD-10-CM

## 2022-01-14 PROCEDURE — 99213 OFFICE O/P EST LOW 20 MIN: CPT | Performed by: INTERNAL MEDICINE

## 2022-01-14 PROCEDURE — 3017F COLORECTAL CA SCREEN DOC REV: CPT | Performed by: INTERNAL MEDICINE

## 2022-01-14 PROCEDURE — G8427 DOCREV CUR MEDS BY ELIG CLIN: HCPCS | Performed by: INTERNAL MEDICINE

## 2022-01-14 RX ORDER — FEXOFENADINE HCL AND PSEUDOEPHEDRINE HCI 180; 240 MG/1; MG/1
1 TABLET, EXTENDED RELEASE ORAL PRN
Qty: 20 TABLET | Refills: 1 | Status: SHIPPED | OUTPATIENT
Start: 2022-01-14

## 2022-01-14 RX ORDER — FLUTICASONE PROPIONATE 50 MCG
SPRAY, SUSPENSION (ML) NASAL
Qty: 16 G | Refills: 5 | Status: SHIPPED | OUTPATIENT
Start: 2022-01-14

## 2022-01-14 NOTE — PROGRESS NOTES
TELEHEALTH EVALUATION -- Audio/Visual (During PZIVW-33 public health emergency)  2022    Assessment/Plan   1. COVID-19 virus infection  Mild symptoms. Advised Tylenol and her Allegra-D for the worsening sinus pressure today. To complete isolation 2022, as long as symptoms better and no fever. Work note has been done for her to return on . SUBJECTIVE/OBJECTIVE  Tosin Pa (:  1965) has requested an audio/video evaluation for the following concern(s):   Positive For Covid-19 (Needs a doctors note. She has a head but no other symptoms)     HPI:  Tested positive for COVID 2022 from exposure on 2022. Scratchy throat and head cold started . Symptoms have remained mild although today she is having more sinus pressure and headache. No nasal discharge  Plans to return to work on Monday. Works for [employer], in office. Denies the following symptoms:fever, chills, shortness of breath or new loss of smell or taste. Review of Systems    Prior to Visit Medications    Medication Sig Taking?  Authorizing Provider   fluticasone (FLONASE) 50 MCG/ACT nasal spray SPRAY 1 SPRAY INTO EACH NOSTRIL EVERY DAY Yes Shivani Arreola MD   fexofenadine-pseudoephedrine (ALLEGRA-D 24HR) 180-240 MG per extended release tablet Take 1 tablet by mouth as needed (allergies and sinus pressure.) Yes Shivani Arreola MD   pioglitazone (ACTOS) 15 MG tablet TAKE 1 TABLET BY MOUTH EVERY DAY Yes Chey Steele MD   JARDIANCE 25 MG tablet TAKE 1 TABLET BY MOUTH EVERY DAY Yes Chey Steele MD   lactulose (CHRONULAC) 10 GM/15ML solution TAKE 8-15 MLS BY MOUTH NIGHTLY AS NEEDED (Norris Shetty) Yes Shivani Arreola MD   omeprazole (PRILOSEC) 40 MG delayed release capsule TAKE 1 CAPSULE BY MOUTH EVERY DAY Yes Shivani Arreola MD   docusate sodium (COLACE) 100 MG capsule Take 1 capsule by mouth 2 times daily as needed for Constipation Yes Shivani Arreola MD   metFORMIN ([place]) 500 MG extended release tablet TAKE 2 TABLETS BY MOUTH TWICE A DAY WITH MEALS Yes Kamilla Nguyen MD   Psyllium (METAMUCIL FIBER PO) Take by mouth Yes Historical Provider, MD   Semaglutide, 1 MG/DOSE, (OZEMPIC, 1 MG/DOSE,) 4 MG/3ML SOPN Inject 1 mg into the skin once a week Yes Kamilla Nguyen MD   spironolactone (ALDACTONE) 50 MG tablet TAKE 1 TABLET BY MOUTH TWICE A DAY Yes Kamilla Nguyen MD   ergocalciferol (DRISDOL) 1.25 MG (49525 UT) capsule Take 1 capsule by mouth once a week Yes Kamilla Nguyen MD   losartan (COZAAR) 100 MG tablet TAKE 1 TABLET BY MOUTH EVERY DAY Yes Kamilla Nguyen MD   hydroCHLOROthiazide (HYDRODIURIL) 25 MG tablet TAKE 1 TABLET BY MOUTH EVERY DAY Yes Kamilla Nguyen MD   ASPIRIN LOW DOSE 81 MG EC tablet TAKE 1 TABLET BY MOUTH EVERY DAY Yes Nathan Mishra MD   celecoxib (CELEBREX) 200 MG capsule TAKE 1 CAPSULE BY MOUTH EVERY DAY Yes Saad Weldon MD   B-D ULTRAFINE III SHORT PEN 31G X 8 MM MISC USE AS DIRECTED TO INJECT INSULIN EVERY DAY Yes Historical Provider, MD       Social History     Tobacco Use    Smoking status: Former Smoker     Packs/day: 0.25     Years: 10.00     Pack years: 2.50     Quit date: 2010     Years since quittin.0    Smokeless tobacco: Former User     Quit date: 2014    Tobacco comment: quit 2 years ago   Vaping Use    Vaping Use: Never used   Substance Use Topics    Alcohol use: Yes     Alcohol/week: 0.0 standard drinks     Comment: socially    Drug use: No            BP Readings from Last 3 Encounters:   09/10/21 110/60   21 124/70   21 123/71     Wt Readings from Last 3 Encounters:   09/10/21 232 lb (105.2 kg)   21 233 lb 9.6 oz (106 kg)   21 238 lb (108 kg)       PHYSICAL EXAMINATION:  Patient-Reported Vitals 2022   Patient-Reported Weight 225lb   Patient-Reported Height 5'7\"      Physical Exam  Constitutional:       General: She is not in acute distress.      Appearance: Normal appearance. She is not ill-appearing. Pulmonary:      Effort: Pulmonary effort is normal.   Skin:     Coloration: Skin is not pale. Neurological:      Mental Status: She is alert. Norma Dubon is a 55 y.o. female being evaluated by a Virtual Visit (video visit) encounter to address concerns as mentioned above. A caregiver was present when appropriate. Pursuant to the emergency declaration under the [address] [place] [address] and the [employer] Act, this Virtual Visit was conducted with patient's (and/or legal guardian's) consent, to reduce the patient's risk of exposure to COVID-19 and provide necessary medical care. The patient (and/or legal guardian) has also been advised to contact this office for worsening conditions or problems, and seek emergency medical treatment and/or call 911 if deemed necessary. Patient identification was verified at the start of the visit: Yes    Services were provided through a video synchronous discussion virtually to substitute for in-person clinic visit. Patient was located at home and provider was located in office or at home. An electronic signature was used to authenticate this note.     --María Elena Sanchez MD

## 2022-01-14 NOTE — LETTER
[facility]) Physicians [address] [address]  Phone: [phone]  Fax: [phone]    Rudy Borden MD        January 14, 2022     Patient: Geovanna Smoker   YOB: 1965   Date of Visit: 1/14/2022       To Whom It May Concern: It is my medical opinion that [address] may return to work on 1/17/2022. If you have any questions or concerns, please don't hesitate to call.     Sincerely,    Rudy Borden MD

## 2022-02-01 ENCOUNTER — TELEPHONE (OUTPATIENT)
Dept: ORTHOPEDIC SURGERY | Age: 57
End: 2022-02-01

## 2022-02-06 DIAGNOSIS — E11.9 TYPE 2 DIABETES MELLITUS WITHOUT COMPLICATION, WITH LONG-TERM CURRENT USE OF INSULIN (HCC): ICD-10-CM

## 2022-02-06 DIAGNOSIS — E11.3299 TYPE 2 DIABETES MELLITUS WITH BACKGROUND RETINOPATHY (HCC): ICD-10-CM

## 2022-02-06 DIAGNOSIS — Z79.4 TYPE 2 DIABETES MELLITUS WITH DIABETIC NEPHROPATHY, WITH LONG-TERM CURRENT USE OF INSULIN (HCC): ICD-10-CM

## 2022-02-06 DIAGNOSIS — I10 ESSENTIAL HYPERTENSION: ICD-10-CM

## 2022-02-06 DIAGNOSIS — Z79.4 TYPE 2 DIABETES MELLITUS WITHOUT COMPLICATION, WITH LONG-TERM CURRENT USE OF INSULIN (HCC): ICD-10-CM

## 2022-02-06 DIAGNOSIS — E11.21 TYPE 2 DIABETES MELLITUS WITH DIABETIC NEPHROPATHY, WITH LONG-TERM CURRENT USE OF INSULIN (HCC): ICD-10-CM

## 2022-02-07 RX ORDER — HYDROCHLOROTHIAZIDE 25 MG/1
TABLET ORAL
Qty: 30 TABLET | Refills: 5 | Status: SHIPPED | OUTPATIENT
Start: 2022-02-07 | End: 2022-10-05

## 2022-02-07 RX ORDER — EMPAGLIFLOZIN 25 MG/1
TABLET, FILM COATED ORAL
Qty: 30 TABLET | Refills: 0 | Status: SHIPPED | OUTPATIENT
Start: 2022-02-07 | End: 2022-03-15

## 2022-02-07 NOTE — TELEPHONE ENCOUNTER
Requested Prescriptions     Pending Prescriptions Disp Refills    hydroCHLOROthiazide (HYDRODIURIL) 25 MG tablet [Pharmacy Med Name: HYDROCHLOROTHIAZIDE 25 MG TAB] 30 tablet 5     Sig: TAKE 1 TABLET BY MOUTH EVERY DAY    JARDIANCE 25 MG tablet [Pharmacy Med Name: Ardena Havers 25 MG TABLET] 30 tablet 0     Sig: TAKE 1 TABLET BY MOUTH EVERY DAY     LOV;VV 1/10/2022  NOV: In office 4/11/2022

## 2022-03-11 ENCOUNTER — OFFICE VISIT (OUTPATIENT)
Dept: INTERNAL MEDICINE CLINIC | Age: 57
End: 2022-03-11
Payer: COMMERCIAL

## 2022-03-11 VITALS
BODY MASS INDEX: 35.4 KG/M2 | HEART RATE: 68 BPM | DIASTOLIC BLOOD PRESSURE: 84 MMHG | RESPIRATION RATE: 12 BRPM | WEIGHT: 226 LBS | OXYGEN SATURATION: 99 % | SYSTOLIC BLOOD PRESSURE: 132 MMHG

## 2022-03-11 DIAGNOSIS — Z00.00 WELLNESS EXAMINATION: Primary | ICD-10-CM

## 2022-03-11 DIAGNOSIS — R82.90 ABNORMAL URINE ODOR: ICD-10-CM

## 2022-03-11 DIAGNOSIS — B37.9 YEAST INFECTION: ICD-10-CM

## 2022-03-11 PROCEDURE — G8482 FLU IMMUNIZE ORDER/ADMIN: HCPCS | Performed by: INTERNAL MEDICINE

## 2022-03-11 PROCEDURE — 90471 IMMUNIZATION ADMIN: CPT | Performed by: INTERNAL MEDICINE

## 2022-03-11 PROCEDURE — 90746 HEPB VACCINE 3 DOSE ADULT IM: CPT | Performed by: INTERNAL MEDICINE

## 2022-03-11 PROCEDURE — 99396 PREV VISIT EST AGE 40-64: CPT | Performed by: INTERNAL MEDICINE

## 2022-03-11 RX ORDER — FLUCONAZOLE 150 MG/1
150 TABLET ORAL ONCE
Qty: 2 TABLET | Refills: 0 | Status: SHIPPED | OUTPATIENT
Start: 2022-03-11 | End: 2022-03-11

## 2022-03-11 ASSESSMENT — PATIENT HEALTH QUESTIONNAIRE - PHQ9
1. LITTLE INTEREST OR PLEASURE IN DOING THINGS: 0
SUM OF ALL RESPONSES TO PHQ QUESTIONS 1-9: 0
2. FEELING DOWN, DEPRESSED OR HOPELESS: 0
SUM OF ALL RESPONSES TO PHQ QUESTIONS 1-9: 0
SUM OF ALL RESPONSES TO PHQ9 QUESTIONS 1 & 2: 0
SUM OF ALL RESPONSES TO PHQ QUESTIONS 1-9: 0
SUM OF ALL RESPONSES TO PHQ QUESTIONS 1-9: 0

## 2022-03-11 NOTE — PROGRESS NOTES
ASSESSMENT/PLAN:   Wellness exam  Discussed age appropriate preventive care including healthy diet, daily exercise, immunizations and age & gender guided screening tests. Hep B #2 today. Abnormal urine odor  -     Urinalysis with Microscopic; Future  -     Culture, Urine  Yeast infection  Comments:  diflucan 150 mg      Return in about 6 months (around 2022). Tosin Pa (:  1965) is a 55 y.o. female, here for annual preventive visit. Doing well. A1c is good and losing weight. Knee hurting. Fell up steps about 1 1/2 weeks ago and was limping. Started celebrex yesterday and is really helping. Wonder if UTI. Thinks maybe Jardiance \"but not giving it up. Has white discharge. Slight itch. Also urine odor like has had with UTI. But no burning and doesn't feel sick. Exercise: 4x/week walking and steps  Diet: Tries to follow lower carbs    Patient's last menstrual period was 2021.      Patient Care Team:  Eugenio Gregory MD as PCP - General (Internal Medicine)  Eugenio Gregory MD as PCP - [address] [place] [address] Provider  Laxmi Norwood MD as Obstetrician (Obstetrics & Gynecology)  Jil Suero MD as Consulting Physician (Endocrinology, Diabetes, & Metabolism)  Laxmi Norwood MD as Obstetrician (Obstetrics & Gynecology)    Health Maintenance   Topic Date Due    Hepatitis C screen  Never done    Diabetic foot exam  2021    COVID-19 Vaccine (3 - Booster for Do Peter series) 10/04/2021    HIV screen  2023 (Originally 1980)    Lipid screen  2022    Potassium monitoring  2022    Creatinine monitoring  2022    Hepatitis B vaccine (3 of 3 - Risk 3-dose series) 2022    Diabetic retinal exam  2022    A1C test (Diabetic or Prediabetic)  2023    Depression Screen  2023    Breast cancer screen  2024    Cervical cancer screen  2025    Colorectal Cancer Screen  2026    DTaP/Tdap/Td vaccine (2 - Td or Tdap) 11/02/2027    Pneumococcal 0-64 years Vaccine (2 of 2 - PPSV23) 04/05/2031    Flu vaccine  Completed    Shingles Vaccine  Completed    Hepatitis A vaccine  Aged Out    Hib vaccine  Aged Out    Meningococcal (ACWY) vaccine  Aged Out     Immunization History   Administered Date(s) Administered    COVID-19, Pfizer Purple top, DILUTE for use, 12+ yrs, 30mcg/0.3mL dose 04/12/2021, 05/04/2021    Hepatitis B Adult (Engerix-B) 09/09/2020, 03/11/2022    Influenza Virus Vaccine 10/07/2017    Influenza, MDCK Quadv, IM, PF (Flucelvax 2 yrs and older) 09/10/2021    Pneumococcal Polysaccharide ([id number]) 11/02/2017    Tdap (Boostrix, Adacel) 11/02/2017    Zoster Recombinant (Shingrix) 07/12/2021, 10/17/2021       Past Medical History:   Diagnosis Date    Arthritis of left acromioclavicular joint 6/19/2017    Cataract     Complete tear of left rotator cuff 6/19/2017    COVID     Essential hypertension 4/16/2018    Fibroid     Fibroids     uterine    Iron deficiency anemia 11/22/2020    Normal colonoscopy, mennorhagia    Iron deficiency anemia 11/22/2020    Rupture of biceps tendon 6/19/2017    Type II or unspecified type diabetes mellitus without mention of complication, not stated as uncontrolled     Urogenital trichomoniasis        Outpatient Medications Marked as Taking for the 3/11/22 encounter (Office Visit) with Isidra Alberto MD   Medication Sig Dispense Refill    fluconazole (DIFLUCAN) 150 MG tablet Take 1 tablet by mouth once for 1 dose May repeat in 3-5 days, if symptoms persist or recur.  2 tablet 0    hydroCHLOROthiazide (HYDRODIURIL) 25 MG tablet TAKE 1 TABLET BY MOUTH EVERY DAY 30 tablet 5    JARDIANCE 25 MG tablet TAKE 1 TABLET BY MOUTH EVERY DAY 30 tablet 0    OZEMPIC, 1 MG/DOSE, 4 MG/3ML SOPN INJECT 1 MG INTO THE SKIN ONCE A WEEK 3 mL 3    fluticasone (FLONASE) 50 MCG/ACT nasal spray SPRAY 1 SPRAY INTO EACH NOSTRIL EVERY DAY 16 g 5    fexofenadine-pseudoephedrine (ALLEGRA-D 24HR) 180-240 MG per extended release tablet Take 1 tablet by mouth as needed (allergies and sinus pressure.) 20 tablet 1    pioglitazone (ACTOS) 15 MG tablet TAKE 1 TABLET BY MOUTH EVERY DAY 30 tablet 5    lactulose (CHRONULAC) 10 GM/15ML solution TAKE 8-15 MLS BY MOUTH NIGHTLY AS NEEDED (CONSITPATION) 473 mL 2    omeprazole (PRILOSEC) 40 MG delayed release capsule TAKE 1 CAPSULE BY MOUTH EVERY DAY 30 capsule 3    docusate sodium (COLACE) 100 MG capsule Take 1 capsule by mouth 2 times daily as needed for Constipation 50 capsule 1    metFORMIN (GLUCOPHAGE-XR) 500 MG extended release tablet TAKE 2 TABLETS BY MOUTH TWICE A DAY WITH MEALS 120 tablet 5    Psyllium (METAMUCIL FIBER PO) Take by mouth      spironolactone (ALDACTONE) 50 MG tablet TAKE 1 TABLET BY MOUTH TWICE A  tablet 3    losartan (COZAAR) 100 MG tablet TAKE 1 TABLET BY MOUTH EVERY DAY 30 tablet 5    ASPIRIN LOW DOSE 81 MG EC tablet TAKE 1 TABLET BY MOUTH EVERY DAY 30 tablet 11    celecoxib (CELEBREX) 200 MG capsule TAKE 1 CAPSULE BY MOUTH EVERY DAY 30 capsule 2    B-D ULTRAFINE III SHORT PEN 31G X 8 MM MISC USE AS DIRECTED TO INJECT INSULIN EVERY DAY  3        No Known Allergies    Past Surgical History:   Procedure Laterality Date    CATARACT REMOVAL WITH IMPLANT      COLONOSCOPY  16    incomplete, Dr Morgan Laundry EXTRACTION         Social History     Tobacco Use    Smoking status: Former Smoker     Packs/day: 0.25     Years: 10.00     Pack years: 2.50     Quit date: 2010     Years since quittin.1    Smokeless tobacco: Former User     Quit date: 2014    Tobacco comment: quit 2 years ago   Vaping Use    Vaping Use: Never used   Substance Use Topics    Alcohol use:  Yes     Alcohol/week: 0.0 standard drinks     Comment: socially    Drug use: No        Family History   Problem Relation Age of Onset    Diabetes Mother     High Blood Pressure Mother     High Cholesterol Mother     Uterine Cancer Mother     High Blood Pressure Father     Other Father         cardiac amyloidosis    Heart Disease Sister     Diabetes Maternal Aunt     Stroke Maternal Aunt     Heart Disease Maternal Uncle     Cancer Maternal Uncle     Cancer Maternal Grandmother     Diabetes Maternal Grandfather     Diabetes Maternal Aunt     Stroke Maternal Aunt     Diabetes Maternal Aunt     Stroke Maternal Aunt     Heart Disease Maternal Aunt     No Known Problems Brother     No Known Problems Paternal Aunt     No Known Problems Paternal Uncle     No Known Problems Paternal Grandmother     No Known Problems Paternal Grandfather     No Known Problems Other     Rheum Arthritis Neg Hx     Osteoarthritis Neg Hx     Asthma Neg Hx     Breast Cancer Neg Hx     Heart Failure Neg Hx     Hypertension Neg Hx     Migraines Neg Hx     Ovarian Cancer Neg Hx     Rashes/Skin Problems Neg Hx     Seizures Neg Hx     Thyroid Disease Neg Hx      Review of Systems    Wt Readings from Last 5 Encounters:   03/11/22 226 lb (102.5 kg)   09/10/21 232 lb (105.2 kg)   05/14/21 233 lb 9.6 oz (106 kg)   04/08/21 238 lb (108 kg)   03/09/21 236 lb (107 kg)     Vitals:    03/11/22 1532   BP: 132/84   Pulse: 68   Resp: 12   SpO2: 99%   Weight: 226 lb (102.5 kg)     Estimated body mass index is 35.4 kg/m² as calculated from the following:    Height as of 4/8/21: 5' 7\" (1.702 m). Weight as of this encounter: 226 lb (102.5 kg). Physical Exam  Constitutional:       Appearance: Normal appearance. She is well-developed. HENT:      Right Ear: Tympanic membrane, ear canal and external ear normal.      Left Ear: Tympanic membrane, ear canal and external ear normal.   Eyes:      Conjunctiva/sclera: Conjunctivae normal.      Pupils: Pupils are equal, round, and reactive to light. Neck:      Thyroid: No thyromegaly. Vascular: No carotid bruit. Cardiovascular:      Rate and Rhythm: Normal rate and regular rhythm. Pulses: Normal pulses. Heart sounds: Normal heart sounds. No murmur heard. Pulmonary:      Effort: Pulmonary effort is normal.      Breath sounds: Normal breath sounds. Abdominal:      General: Bowel sounds are normal.      Palpations: Abdomen is soft. There is no mass. Tenderness: There is no abdominal tenderness. Musculoskeletal:      Left knee: No swelling. Normal range of motion. Right lower leg: No edema. Left lower leg: No edema. Lymphadenopathy:      Cervical: No cervical adenopathy. Skin:     General: Skin is warm and dry. Coloration: Skin is not pale. Comments: No suspicious skin lesions   Neurological:      General: No focal deficit present. Mental Status: She is alert. Psychiatric:         Mood and Affect: Mood normal.         No flowsheet data found. Lab Results   Component Value Date    CHOL 117 12/26/2018    CHOLFAST 149 05/01/2021    TRIG 113 12/26/2018    TRIGLYCFAST 76 05/01/2021    HDL 69 05/01/2021    LDLCALC 65 05/01/2021    GLUCOSE 215 05/01/2021    LABA1C 7.1 01/07/2022       The 10-year ASCVD risk score (Thomas Larsen et al., 2013) is: 7.9%    Values used to calculate the score:      Age: 55 years      Sex: Female      Is Non- : Yes      Diabetic: Yes      Tobacco smoker: No      Systolic Blood Pressure: 575 mmHg      Is BP treated: Yes      HDL Cholesterol: 69 mg/dL      Total Cholesterol: 149 mg/dL      An electronic signature was used to authenticate this note.     --Fernando Angel MD

## 2022-03-14 LAB
ORGANISM: ABNORMAL
URINE CULTURE, ROUTINE: ABNORMAL

## 2022-03-14 RX ORDER — SULFAMETHOXAZOLE AND TRIMETHOPRIM 800; 160 MG/1; MG/1
1 TABLET ORAL 2 TIMES DAILY
Qty: 14 TABLET | Refills: 0 | Status: SHIPPED | OUTPATIENT
Start: 2022-03-14 | End: 2022-03-21

## 2022-03-15 DIAGNOSIS — Z79.4 TYPE 2 DIABETES MELLITUS WITH DIABETIC NEPHROPATHY, WITH LONG-TERM CURRENT USE OF INSULIN (HCC): ICD-10-CM

## 2022-03-15 DIAGNOSIS — E11.21 TYPE 2 DIABETES MELLITUS WITH DIABETIC NEPHROPATHY, WITH LONG-TERM CURRENT USE OF INSULIN (HCC): ICD-10-CM

## 2022-03-15 DIAGNOSIS — E11.3299 TYPE 2 DIABETES MELLITUS WITH BACKGROUND RETINOPATHY (HCC): ICD-10-CM

## 2022-03-15 DIAGNOSIS — Z79.4 TYPE 2 DIABETES MELLITUS WITHOUT COMPLICATION, WITH LONG-TERM CURRENT USE OF INSULIN (HCC): ICD-10-CM

## 2022-03-15 DIAGNOSIS — E11.9 TYPE 2 DIABETES MELLITUS WITHOUT COMPLICATION, WITH LONG-TERM CURRENT USE OF INSULIN (HCC): ICD-10-CM

## 2022-03-15 RX ORDER — EMPAGLIFLOZIN 25 MG/1
TABLET, FILM COATED ORAL
Qty: 30 TABLET | Refills: 0 | Status: SHIPPED | OUTPATIENT
Start: 2022-03-15 | End: 2022-04-25 | Stop reason: SDUPTHER

## 2022-03-17 ENCOUNTER — TELEPHONE (OUTPATIENT)
Dept: INTERNAL MEDICINE CLINIC | Age: 57
End: 2022-03-17

## 2022-03-17 RX ORDER — NITROFURANTOIN 25; 75 MG/1; MG/1
100 CAPSULE ORAL 2 TIMES DAILY
Qty: 10 CAPSULE | Refills: 0 | Status: SHIPPED | OUTPATIENT
Start: 2022-03-17 | End: 2022-03-22

## 2022-03-17 NOTE — TELEPHONE ENCOUNTER
Patient was recently prescribed Bactrim and is now experiencing Nausea and dizziness.  She is requesting a different abx  Thank you
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
## 2022-03-17 NOTE — TELEPHONE ENCOUNTER
Patient states that due to recent ATB prescribed, she is experiencing  Nausea and Dizziness. She requesting adifferent ATB please be prescribied.

## 2022-04-07 DIAGNOSIS — I10 ESSENTIAL HYPERTENSION: ICD-10-CM

## 2022-04-07 DIAGNOSIS — E11.69 TYPE 2 DIABETES MELLITUS WITH OTHER SPECIFIED COMPLICATION, WITH LONG-TERM CURRENT USE OF INSULIN (HCC): ICD-10-CM

## 2022-04-07 DIAGNOSIS — Z79.4 TYPE 2 DIABETES MELLITUS WITH OTHER SPECIFIED COMPLICATION, WITH LONG-TERM CURRENT USE OF INSULIN (HCC): ICD-10-CM

## 2022-04-07 RX ORDER — LOSARTAN POTASSIUM 100 MG/1
TABLET ORAL
Qty: 30 TABLET | Refills: 5 | Status: SHIPPED | OUTPATIENT
Start: 2022-04-07 | End: 2022-10-03

## 2022-04-08 DIAGNOSIS — Z79.4 TYPE 2 DIABETES MELLITUS WITHOUT COMPLICATION, WITH LONG-TERM CURRENT USE OF INSULIN (HCC): ICD-10-CM

## 2022-04-08 DIAGNOSIS — E11.21 TYPE 2 DIABETES MELLITUS WITH DIABETIC NEPHROPATHY, WITH LONG-TERM CURRENT USE OF INSULIN (HCC): ICD-10-CM

## 2022-04-08 DIAGNOSIS — E11.9 TYPE 2 DIABETES MELLITUS WITHOUT COMPLICATION, WITH LONG-TERM CURRENT USE OF INSULIN (HCC): ICD-10-CM

## 2022-04-08 DIAGNOSIS — E11.3299 TYPE 2 DIABETES MELLITUS WITH BACKGROUND RETINOPATHY (HCC): ICD-10-CM

## 2022-04-08 DIAGNOSIS — Z79.4 TYPE 2 DIABETES MELLITUS WITH DIABETIC NEPHROPATHY, WITH LONG-TERM CURRENT USE OF INSULIN (HCC): ICD-10-CM

## 2022-04-11 RX ORDER — EMPAGLIFLOZIN 25 MG/1
TABLET, FILM COATED ORAL
Qty: 30 TABLET | Refills: 0 | OUTPATIENT
Start: 2022-04-11

## 2022-04-25 ENCOUNTER — OFFICE VISIT (OUTPATIENT)
Dept: ENDOCRINOLOGY | Age: 57
End: 2022-04-25
Payer: COMMERCIAL

## 2022-04-25 VITALS
OXYGEN SATURATION: 98 % | BODY MASS INDEX: 34.47 KG/M2 | TEMPERATURE: 97.2 F | HEART RATE: 90 BPM | HEIGHT: 67 IN | DIASTOLIC BLOOD PRESSURE: 62 MMHG | SYSTOLIC BLOOD PRESSURE: 118 MMHG | WEIGHT: 219.6 LBS

## 2022-04-25 DIAGNOSIS — E11.3299 TYPE 2 DIABETES MELLITUS WITH BACKGROUND RETINOPATHY (HCC): ICD-10-CM

## 2022-04-25 DIAGNOSIS — E11.21 TYPE 2 DIABETES MELLITUS WITH DIABETIC NEPHROPATHY, WITH LONG-TERM CURRENT USE OF INSULIN (HCC): ICD-10-CM

## 2022-04-25 DIAGNOSIS — E11.9 TYPE 2 DIABETES MELLITUS WITHOUT COMPLICATION, WITH LONG-TERM CURRENT USE OF INSULIN (HCC): ICD-10-CM

## 2022-04-25 DIAGNOSIS — Z79.4 TYPE 2 DIABETES MELLITUS WITH DIABETIC NEPHROPATHY, WITH LONG-TERM CURRENT USE OF INSULIN (HCC): ICD-10-CM

## 2022-04-25 DIAGNOSIS — Z79.4 TYPE 2 DIABETES MELLITUS WITHOUT COMPLICATION, WITH LONG-TERM CURRENT USE OF INSULIN (HCC): ICD-10-CM

## 2022-04-25 PROCEDURE — 1036F TOBACCO NON-USER: CPT | Performed by: INTERNAL MEDICINE

## 2022-04-25 PROCEDURE — 2022F DILAT RTA XM EVC RTNOPTHY: CPT | Performed by: INTERNAL MEDICINE

## 2022-04-25 PROCEDURE — 3051F HG A1C>EQUAL 7.0%<8.0%: CPT | Performed by: INTERNAL MEDICINE

## 2022-04-25 PROCEDURE — G8427 DOCREV CUR MEDS BY ELIG CLIN: HCPCS | Performed by: INTERNAL MEDICINE

## 2022-04-25 PROCEDURE — 3017F COLORECTAL CA SCREEN DOC REV: CPT | Performed by: INTERNAL MEDICINE

## 2022-04-25 PROCEDURE — 99214 OFFICE O/P EST MOD 30 MIN: CPT | Performed by: INTERNAL MEDICINE

## 2022-04-25 PROCEDURE — G8417 CALC BMI ABV UP PARAM F/U: HCPCS | Performed by: INTERNAL MEDICINE

## 2022-04-25 PROCEDURE — G9899 SCRN MAM PERF RSLTS DOC: HCPCS | Performed by: INTERNAL MEDICINE

## 2022-04-25 RX ORDER — FLASH GLUCOSE SENSOR
KIT MISCELLANEOUS
Qty: 2 EACH | Refills: 5 | Status: SHIPPED | OUTPATIENT
Start: 2022-04-25 | End: 2022-10-03

## 2022-04-25 RX ORDER — SEMAGLUTIDE 2.68 MG/ML
INJECTION, SOLUTION SUBCUTANEOUS
Qty: 3 ML | Refills: 3 | Status: SHIPPED | OUTPATIENT
Start: 2022-04-25 | End: 2022-10-03 | Stop reason: SDUPTHER

## 2022-04-25 RX ORDER — EMPAGLIFLOZIN 25 MG/1
TABLET, FILM COATED ORAL
Qty: 30 TABLET | Refills: 0 | Status: SHIPPED | OUTPATIENT
Start: 2022-04-25 | End: 2022-05-26

## 2022-04-25 RX ORDER — FLASH GLUCOSE SCANNING READER
EACH MISCELLANEOUS
Qty: 1 EACH | Refills: 0 | Status: SHIPPED | OUTPATIENT
Start: 2022-04-25 | End: 2022-10-03

## 2022-04-25 RX ORDER — SEMAGLUTIDE 1.34 MG/ML
1 INJECTION, SOLUTION SUBCUTANEOUS WEEKLY
Qty: 3 ML | Refills: 3 | Status: CANCELLED | OUTPATIENT
Start: 2022-04-25

## 2022-04-25 NOTE — PROGRESS NOTES
Patient ID:   Eliud Randhawa is a 65 y.o. female    Chief Complaint:   Eliud Randhawa presents for an evaluation of Type 2 Diabetes Mellitus , Hyperlipidemia and hypertension. Subjective:   Type 2 Diabetes Mellitus diagnosed at age 30-32. On insulin since Nov 2017. Invokana caused UTI in past.     She stopped [place] for cramping. She did not like Basalar or Lantus. She did not like Trulicity as much as Ozempic     Currently at home due to COVID infection. She did not have much symptoms. Metformin ER 500mg, two tabs twice daily       Actos 15 mg daily   Jardiance 25mg daily. She takes half in am and half at dinner. She likes it as it keeps sugars under control in am.  H/o UTI's. Last episode 2 months ago, got anti biotics from pcp or urgent care. Ozempic 1 mg weekly on Saturdays     She lost 19 lbs in 12 months     Out for a meter for a month     Checks blood sugars 0 times per day. 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 Occasional snacks (chips, pretzels). Diet pepsi. Exercise: Walk around the work during lunch break. Denies chest pain, exertional dyspnea. Family history of CAD: in multiple family members   Denies smoking/alcohol.    Currently on ASA 81 mg daily     The following portions of the patient's history were reviewed and updated as appropriate:       Family History   Problem Relation Age of Onset    Diabetes Mother     High Blood Pressure Mother     High Cholesterol Mother     Uterine Cancer Mother     High Blood Pressure Father     Other Father         cardiac amyloidosis    Heart Disease Sister     Diabetes Maternal Aunt     Stroke Maternal Aunt     Heart Disease Maternal Uncle     Cancer Maternal Uncle     Cancer Maternal Grandmother     Diabetes Maternal Grandfather     Diabetes Maternal Aunt     Stroke Maternal Aunt     Diabetes Maternal Aunt     Stroke Maternal Aunt     Heart Disease Maternal Aunt     No Known Problems Brother     No Known Problems Paternal Aunt     No Known Problems Paternal Uncle     No Known Problems Paternal Grandmother     No Known Problems Paternal Grandfather     No Known Problems Other     Rheum Arthritis Neg Hx     Osteoarthritis Neg Hx     Asthma Neg Hx     Breast Cancer Neg Hx     Heart Failure Neg Hx     Hypertension Neg Hx     Migraines Neg Hx     Ovarian Cancer Neg Hx     Rashes/Skin Problems Neg Hx     Seizures Neg Hx     Thyroid Disease Neg Hx          Social History     Socioeconomic History    Marital status: Single     Spouse name: Not on file    Number of children: Not on file    Years of education: Not on file    Highest education level: Not on file   Occupational History    Occupation:  for [employer]   Tobacco Use    Smoking status: Former Smoker     Packs/day: 0.25     Years: 10.00     Pack years: 2.50     Quit date: 2010     Years since quittin.3    Smokeless tobacco: Former User     Quit date: 2014    Tobacco comment: quit 2 years ago   Vaping Use    Vaping Use: Never used   Substance and Sexual Activity    Alcohol use: Yes     Alcohol/week: 0.0 standard drinks     Comment: socially    Drug use: No    Sexual activity: Not Currently     Partners: Male   Other Topics Concern    Not on file   Social History Narrative    Not on file     Social Determinants of Health     Financial Resource Strain: Low Risk     Difficulty of Paying Living Expenses: Not hard at all   Food Insecurity: No Food Insecurity    Worried About [address] in the Last Year: Never true    [facility] in the Last Year: Never true   Transportation Needs:     Lack of Transportation (Medical): Not on file    Lack of Transportation (Non-Medical):  Not on file   Physical Activity:     Days of Exercise per Week: Not on file    Minutes of Exercise per Session: Not on file   Stress:  Feeling of Stress : Not on file   Social Connections:     Frequency of Communication with Friends and Family: Not on file    Frequency of Social Gatherings with Friends and Family: Not on file    Attends Druze Services: Not on file    Active Member of Clubs or Organizations: Not on file    Attends Club or Organization Meetings: Not on file    Marital Status: Not on file   Intimate Partner Violence:     Fear of Current or Ex-Partner: Not on file    Emotionally Abused: Not on file    Physically Abused: Not on file    Sexually Abused: Not on file   Housing Stability:     Unable to Pay for Housing in the Last Year: Not on file    Number of [place] in the Last Year: Not on file    Unstable Housing in the Last Year: Not on file       Past Medical History:   Diagnosis Date    Arthritis of left acromioclavicular joint 6/19/2017    Cataract     Complete tear of left rotator cuff 6/19/2017    COVID     Essential hypertension 4/16/2018    Fibroid     Fibroids     uterine    Iron deficiency anemia 11/22/2020    Normal colonoscopy, mennorhagia    Iron deficiency anemia 11/22/2020    Rupture of biceps tendon 6/19/2017    Type II or unspecified type diabetes mellitus without mention of complication, not stated as uncontrolled     Urogenital trichomoniasis        Past Surgical History:   Procedure Laterality Date    CATARACT REMOVAL WITH IMPLANT      COLONOSCOPY  5/23/16    incomplete, Dr Terrazas Speaker         No Known Allergies      Current Outpatient Medications:     empagliflozin (JARDIANCE) 25 MG tablet, TAKE 1 TABLET BY MOUTH EVERY DAY, Disp: 30 tablet, Rfl: 0    Semaglutide, 2 MG/DOSE, (OZEMPIC, 2 MG/DOSE,) 8 MG/3ML SOPN, 2mg subcutaneously every week, Disp: 3 mL, Rfl: 3    Continuous Blood Gluc  (iCouchYLE PREET 2 READER) MARTI, Use for personal CGMS.  On Multiple insulin shots, checks blood sugars 4 times per day and requires frequent insulin adjustment. , Disp: 1 each, Rfl: 0    Continuous Blood Gluc Sensor (FREESTYLE PREET 2 SENSOR) MISC, Replace every 2 weeks, Disp: 2 each, Rfl: 5    losartan (COZAAR) 100 MG tablet, TAKE 1 TABLET BY MOUTH EVERY DAY, Disp: 30 tablet, Rfl: 5    hydroCHLOROthiazide (HYDRODIURIL) 25 MG tablet, TAKE 1 TABLET BY MOUTH EVERY DAY, Disp: 30 tablet, Rfl: 5    fluticasone (FLONASE) 50 MCG/ACT nasal spray, SPRAY 1 SPRAY INTO EACH NOSTRIL EVERY DAY, Disp: 16 g, Rfl: 5    fexofenadine-pseudoephedrine (ALLEGRA-D 24HR) 180-240 MG per extended release tablet, Take 1 tablet by mouth as needed (allergies and sinus pressure.), Disp: 20 tablet, Rfl: 1    pioglitazone (ACTOS) 15 MG tablet, TAKE 1 TABLET BY MOUTH EVERY DAY, Disp: 30 tablet, Rfl: 5    lactulose (CHRONULAC) 10 GM/15ML solution, TAKE 8-15 MLS BY MOUTH NIGHTLY AS NEEDED (CONSITPATION), Disp: 473 mL, Rfl: 2    omeprazole (PRILOSEC) 40 MG delayed release capsule, TAKE 1 CAPSULE BY MOUTH EVERY DAY, Disp: 30 capsule, Rfl: 3    docusate sodium (COLACE) 100 MG capsule, Take 1 capsule by mouth 2 times daily as needed for Constipation, Disp: 50 capsule, Rfl: 1    metFORMIN (GLUCOPHAGE-XR) 500 MG extended release tablet, TAKE 2 TABLETS BY MOUTH TWICE A DAY WITH MEALS, Disp: 120 tablet, Rfl: 5    Psyllium (METAMUCIL FIBER PO), Take by mouth as needed , Disp: , Rfl:     spironolactone (ALDACTONE) 50 MG tablet, TAKE 1 TABLET BY MOUTH TWICE A DAY, Disp: 180 tablet, Rfl: 3    ASPIRIN LOW DOSE 81 MG EC tablet, TAKE 1 TABLET BY MOUTH EVERY DAY, Disp: 30 tablet, Rfl: 11    celecoxib (CELEBREX) 200 MG capsule, TAKE 1 CAPSULE BY MOUTH EVERY DAY, Disp: 30 capsule, Rfl: 2    B-D ULTRAFINE III SHORT PEN 31G X 8 MM MISC, USE AS DIRECTED TO INJECT INSULIN EVERY DAY, Disp: , Rfl: 3    ergocalciferol (DRISDOL) 1.25 MG (00491 UT) capsule, Take 1 capsule by mouth once a week, Disp: 12 capsule, Rfl: 1      Review of Systems:    Constitutional: Negative for fever, chills, and unexpected weight change. HENT: Negative for congestion, ear pain, rhinorrhea,  sore throat and trouble swallowing. Eyes: Negative for photophobia, redness, itching. Respiratory: Negative for cough, shortness of breath and sputum. Cardiovascular: Negative for chest pain, palpitations and leg swelling. Gastrointestinal: Negative for nausea, vomiting, abdominal pain, diarrhea, constipation. Endocrine: Negative for cold intolerance, heat intolerance, polydipsia, polyphagia and polyuria. Genitourinary: Negative for dysuria, urgency, frequency, hematuria and flank pain. Musculoskeletal: Negative for myalgias, back pain, arthralgias and neck pain. Skin/Nail: Negative for rash, itching. Normal nails. Neurological: Negative for seizures, weakness, light-headedness, numbness and headaches. Hematological/ Lymph nodes: Negative for adenopathy. Does not bruise/bleed easily. Psychiatric/Behavioral: Negative for suicidal ideas, depression, anxiety, sleep disturbance and decreased concentration. Objective:   Physical Exam:  /62 (Site: Left Upper Arm, Position: Sitting, Cuff Size: Large Adult)   Pulse 90   Temp 97.2 °F (36.2 °C)   Ht 5' 7\" (1.702 m)   Wt 219 lb 9.6 oz (99.6 kg)   SpO2 98%   BMI 34.39 kg/m²     Constitutional: Patient is oriented to person, place, and time. Patient appears well-developed and well-nourished. Pulmonary/Chest: Effort normal.   Neurological: Patient is alert and oriented to person, place, and time. Psychiatric: Patient has a normal mood and affect.  Patient behavior is normal.     Lab Review:    Office Visit on 03/11/2022   Component Date Value Ref Range Status    Organism 03/11/2022 Citrobacter koseri*  Final    Urine Culture, Routine 03/11/2022 >100,000 CFU/ml   Final   Orders Only on 01/07/2022   Component Date Value Ref Range Status    Hemoglobin A1C 01/07/2022 7.1  See comment % Final    eAG 01/07/2022 157.1  mg/dL Final   Orders Only on 09/07/2021 Component Date Value Ref Range Status    Visual Acuity Distance Right Eye 09/07/2021 20/20   Final    Visual Acuity Distance Left Eye 09/07/2021 20/25   Final    Intraocular Pressure Eye 09/07/2021    Final                    Value:16  14      Diabetic Retinopathy 09/07/2021 POSITIVE - MONITOR   Final    Cataracts 09/07/2021 NEGATIVE   Final    Glaucoma 09/07/2021 NEGATIVE   Final   Orders Only on 08/26/2021   Component Date Value Ref Range Status    Vit D, 25-Hydroxy 08/26/2021 30.7  >=30 ng/mL Final    Hemoglobin A1C 08/26/2021 8.4  See comment % Final    eAG 08/26/2021 194.4  mg/dL Final   Orders Only on 05/01/2021   Component Date Value Ref Range Status    Hemoglobin, Elp 05/01/2021 See Comment   Final    Fructosamine 05/01/2021 383* 170 - 285 umol/L Final    Magnesium 05/01/2021 2.00  1.80 - 2.40 mg/dL Final    Vit D, 25-Hydroxy 05/01/2021 24.0* >=30 ng/mL Final    Hemoglobin A1C 05/01/2021 9.0  See comment % Final    eAG 05/01/2021 211.6  mg/dL Final    Cholesterol, Fasting 05/01/2021 149  0 - 199 mg/dL Final    Triglyceride, Fasting 05/01/2021 76  0 - 150 mg/dL Final    HDL 05/01/2021 69* 40 - 60 mg/dL Final    LDL Calculated 05/01/2021 65  <100 mg/dL Final    VLDL Cholesterol Calculated 05/01/2021 15  Not Established mg/dL Final    Sodium 05/01/2021 134* 136 - 145 mmol/L Final    Potassium 05/01/2021 4.9  3.5 - 5.1 mmol/L Final    Chloride 05/01/2021 98* 99 - 110 mmol/L Final    CO2 05/01/2021 25  21 - 32 mmol/L Final    Anion Gap 05/01/2021 11  3 - 16 Final    Glucose 05/01/2021 215* 70 - 99 mg/dL Final    BUN 05/01/2021 32* 7 - 20 mg/dL Final    CREATININE 05/01/2021 0.9  0.6 - 1.1 mg/dL Final    GFR Non- 05/01/2021 >60  >60 Final    GFR  05/01/2021 >60  >60 Final    Calcium 05/01/2021 9.5  8.3 - 10.6 mg/dL Final    Total Protein 05/01/2021 7.2  6.4 - 8.2 g/dL Final    Albumin 05/01/2021 4.3  3.4 - 5.0 g/dL Final    Albumin/Globulin Ratio 05/01/2021 1.5  1.1 - 2.2 Final    Total Bilirubin 05/01/2021 0.3  0.0 - 1.0 mg/dL Final    Alkaline Phosphatase 05/01/2021 131* 40 - 129 U/L Final    ALT 05/01/2021 15  10 - 40 U/L Final    AST 05/01/2021 12* 15 - 37 U/L Final    Globulin 05/01/2021 2.9  g/dL Final    Microalbumin, Random Urine 05/01/2021 <1.20  <2.0 mg/dL Final    Creatinine, Ur 05/01/2021 48.2  28.0 - 259.0 mg/dL Final    Microalbumin Creatinine Ratio 05/01/2021 see below  0.0 - 30.0 mg/g Final    Interpretation + ELECTROPHORESIS, * 05/01/2021 REVIEWED   Final           Assessment and Plan     Tosin was seen today for diabetes. Diagnoses and all orders for this visit:    Type 2 diabetes mellitus without complication, with long-term current use of insulin ([facility])  -     empagliflozin (JARDIANCE) 25 MG tablet; TAKE 1 TABLET BY MOUTH EVERY DAY  -     Semaglutide, 2 MG/DOSE, (OZEMPIC, 2 MG/DOSE,) 8 MG/3ML SOPN; 2mg subcutaneously every week  -     Continuous Blood Gluc  (FREESTYLE PREET 2 READER) MARTI; Use for personal CGMS. On Multiple insulin shots, checks blood sugars 4 times per day and requires frequent insulin adjustment. -     Continuous Blood Gluc Sensor (FREESTYLE PREET 2 SENSOR) MISC; Replace every 2 weeks    Type 2 diabetes mellitus with background retinopathy (HCC)  -     empagliflozin (JARDIANCE) 25 MG tablet; TAKE 1 TABLET BY MOUTH EVERY DAY  -     Semaglutide, 2 MG/DOSE, (OZEMPIC, 2 MG/DOSE,) 8 MG/3ML SOPN; 2mg subcutaneously every week  -     Continuous Blood Gluc  (FREESTYLE PREET 2 READER) MARTI; Use for personal CGMS. On Multiple insulin shots, checks blood sugars 4 times per day and requires frequent insulin adjustment.   -     Continuous Blood Gluc Sensor (FREESTYLE PREET 2 SENSOR) MISC; Replace every 2 weeks    Type 2 diabetes mellitus with diabetic nephropathy, with long-term current use of insulin ([facility])  -     empagliflozin (JARDIANCE) 25 MG tablet; TAKE 1 TABLET BY MOUTH EVERY DAY  - Semaglutide, 2 MG/DOSE, (OZEMPIC, 2 MG/DOSE,) 8 MG/3ML SOPN; 2mg subcutaneously every week  -     Continuous Blood Gluc  (FREESTYLE PREET 2 READER) MARTI; Use for personal CGMS. On Multiple insulin shots, checks blood sugars 4 times per day and requires frequent insulin adjustment. -     Continuous Blood Gluc Sensor (FREESTYLE PREET 2 SENSOR) MISC; Replace every 2 weeks         1: Type 2 DM complicated with nephropathy, retinopathy   Controlled A1C 7.1% < 8.4% <  9% <  8.1% < 9.4% < 8.9% < 8.8% < 8.9 % < 8.7% < 9.7%     A1C is unreliable with iron deficiency anemia   Sister and mother also have anemia     Fructosamine 383 eq to A1C of 9.6%. consistent with A1C     Hb electrophoresis normal May 2021   Blood sugars need to be checked   She said she will pay for [employer] sensor. Rx sent to [facility] as well as to [facility]    She needs to check more frequent blood sugars , 3 x per day     C/w   Metformin ER 500mg two tabs twice a day   Actos 15 mg daily   Jardiance 25mg daily. Change Ozempic to 2 mg weekly . If blood sugars start running better, change jardiance to half tab in am     She is still getting yeast infection. Recommend stopping it. For UTI see PCP     If blood sugars are still high start DAVIS     All instructions provided in written. Check Blood sugars 3 times per day. Log them along with insulin and send them every 2 weeks. Call for blood sugars less than 60 or more than 400. Eye exam: Last exam in Sep 2021.  present, stable. Foot exam:  April 2022.  dystrophic nail with fungal infection of tos, extending to skin on left foot   Deformity/amputation: absent  Skin lesions/pre-ulcerative calluses: absent  Edema: right- negative, left- negative  Sensory exam: Monofilament sensation: normal  Pulses: normal, Vibration (128 Hz): moderately impaired     Renal screen: Normal May 2021     TSH screen: Nov 2017     2: HTN   Controlled at home     3: Hyperlipidemia   LDL: 65, HDL: 69, TGs: 76 - May 2021 She stopped statins due to muscle cramps. She could not not tolerate Crestor 10mg, every other day. Recheck later and if Vit D > 50 start once weekly crestor     Vit D Def: 30 - Aug 2021   C/w ergocalciferol 50k units weekly     4: Hirsutism, since 20's   On face only   Runs in the family   Gonadotropins suggest pre menopause status   Free T high 5.8 (N 0.6-3.8), TT normal 24 July 2018   Free T 4.0 high June 2020. Spironolactone 50mg twice daily, this dose is helping on facial hair. She thinks she it has helped. 5: Morbid obesity   Calorie target <1300 per day   Need to work on diet, exercise and lose weight   Lifestyle changes failed for meaningful weight loss   Belviq 10mg bid , was stared in May 2019 - stopped due to recall     Labs pending: A1C, Vit D, lipids, TSH, MACR, CMP    RTC in 3 months      EDUCATION:   Greater than 50% of this follow-up visit was spent in general counseling regarding   obesity, diet, exercise, importance of adherence to insulin regime, recognition and treatment of hypo and hyperglycemia,  glucose logging, proper diabetes management, diabetic complications with poor management and the importance of glycemic control in order to avoid the complications of diabetes. Risks and potential complications of diabetes were reviewed with the patient. Diabetes health maintenance plan and follow-up were discussed and understood by the patient. We reviewed the importance of medication compliance and regular follow-up. Aggressive lifestyle modification was encouraged. Exercise Counselling: This patient is a candidate for regular physical exercise. Instructions to perform the following types of exercise:  Swimming or water aerobic exercise  Brisk walking  Playing tennis  Stationary bicycle or elliptical indoor  Low impact aerobic exercise    Instructions given to exercise for the following duration:  30 minutes a day for five-seven days per week.     Following instructions for being active throughout the day in addition to formal exercise:  Walk instead of drive whenever possible  Take the stairs instead of the elevator  Work in the garden  Park to the far end of the parking lot to add more walking steps to destination      Electronically signed by Shanel Soler MD on 4/25/2022 at 4:49 PM

## 2022-04-28 NOTE — TELEPHONE ENCOUNTER
Fax received from [facility] pharmacy stating 2 mg Ozempic is not a product that is available to be ordered. Only the 1 mg is available.   Please advise ( fax scanned )

## 2022-05-03 NOTE — TELEPHONE ENCOUNTER
Left a detailed message to check with other pharmacies or plan for alternative. Advised to let the office know so new RX can be sent to pharmacy.

## 2022-05-16 DIAGNOSIS — E11.3299 TYPE 2 DIABETES MELLITUS WITH BACKGROUND RETINOPATHY (HCC): ICD-10-CM

## 2022-05-16 DIAGNOSIS — Z79.4 TYPE 2 DIABETES MELLITUS WITHOUT COMPLICATION, WITH LONG-TERM CURRENT USE OF INSULIN (HCC): ICD-10-CM

## 2022-05-16 DIAGNOSIS — E11.9 TYPE 2 DIABETES MELLITUS WITHOUT COMPLICATION, WITH LONG-TERM CURRENT USE OF INSULIN (HCC): ICD-10-CM

## 2022-05-16 RX ORDER — METFORMIN HYDROCHLORIDE 500 MG/1
TABLET, EXTENDED RELEASE ORAL
Qty: 120 TABLET | Refills: 5 | Status: SHIPPED | OUTPATIENT
Start: 2022-05-16 | End: 2022-10-03 | Stop reason: SDUPTHER

## 2022-05-26 DIAGNOSIS — E11.9 TYPE 2 DIABETES MELLITUS WITHOUT COMPLICATION, WITH LONG-TERM CURRENT USE OF INSULIN (HCC): ICD-10-CM

## 2022-05-26 DIAGNOSIS — Z79.4 TYPE 2 DIABETES MELLITUS WITHOUT COMPLICATION, WITH LONG-TERM CURRENT USE OF INSULIN (HCC): ICD-10-CM

## 2022-05-26 DIAGNOSIS — E11.3299 TYPE 2 DIABETES MELLITUS WITH BACKGROUND RETINOPATHY (HCC): ICD-10-CM

## 2022-05-26 DIAGNOSIS — E11.21 TYPE 2 DIABETES MELLITUS WITH DIABETIC NEPHROPATHY, WITH LONG-TERM CURRENT USE OF INSULIN (HCC): ICD-10-CM

## 2022-05-26 DIAGNOSIS — Z79.4 TYPE 2 DIABETES MELLITUS WITH DIABETIC NEPHROPATHY, WITH LONG-TERM CURRENT USE OF INSULIN (HCC): ICD-10-CM

## 2022-05-26 RX ORDER — EMPAGLIFLOZIN 25 MG/1
TABLET, FILM COATED ORAL
Qty: 30 TABLET | Refills: 5 | Status: SHIPPED | OUTPATIENT
Start: 2022-05-26 | End: 2022-10-03 | Stop reason: SDUPTHER

## 2022-07-22 NOTE — TELEPHONE ENCOUNTER
Called patient in reference to a referral to Colorectal Surgery for colon cancer screening. Patient verbally consented to a Colonoscopy and requested to be scheduled for a Colonoscopy on 09/28/2022 Patient was advised a designated  is required on the day of the Colonoscopy to drive the patient home and the  must be at least. 18 years old.Colonoscopy Prep instructions were thoroughly explained and discussed with the patient.   It was emphasized, and reiterated to the patient, not to follow the prep instructions that comes with the Prep Kit. However, to please follow the prep instructions that will be received in the mail from the [facility]   Patient acknowledges understanding Prep instructions as explained and discussed on the phone.. Patient was advised the Colonoscopy Prep instructions discussed and explained on the phone,are being mailed out to the patient's verified address on file. Patient's address on file was verified with the patient for accuracy of mailing. Patient's medications on file was reviewed with the patient for accuracy of information. Patient denies taking  any other medications other than those listed and verified on medication profile.Patient was explained the Colonoscopy will be performed here at [facility]. Patient was further explained the Pre-Op will call one day prior to the procedure date, to discuss Pre-Op instructions;and what time to report for the Colonoscopy. The patient was given the opportunity to ask any questions about the Colonoscopy. No further issues were discussed.    Bowel Prep/SUPREP instructions                                               [facility]    [address] [place] [address]      You are scheduled for a Colonoscopy with _______________________ on ____________________   At [facility] in [place].    Check in at the hospital on 1st floor Registration area next to Emergency room.   Please do not contact patient. She has seen orthopedics in the past and will return if she feels that is indicated.   Please call [phone] to reschedule or if you have any questions.    An adult friend/family member must come with you to drive you home.  You cannot drive, take a taxi, Uber/Lyft or bus to leave the Hospital alone. If you do not have someone with you to drive you home, your test will be cancelled.       Please follow the directions of your doctor if you take any pills that thin your blood.  If you take these meds: Aggrenox, Brilinta, Effient, Eliquis, Lovenox, Plavix, Pletal Pradaxa ticilid, Xarelto, or Coumadin, let the doctor's office know.    Don't: On the morning of the test do not take insulin or pills for diabetes.   Do: On the morning of the test, do take any pills for blood pressure, heart, anti-rejection and or seizures with a small sip of water. Bring any inhalers with you day of procedure.    To have a good prep, you must follow these instructions- please do not use the directions from the pharmacy!      The doctor will send a prescription for the SUPREP   The day Before the test:   You can only drink CLEAR LIQUIDS the whole day before your test. You can't eat any food for the whole day.    You CAN have:   Water,Coffee or decaf coffee (no milk or cream)    Tea   Soft drinks- regular and sugar free   Jell-O (green or yellow)   Apple Juice, grape juice, white cranberry juice   Gatorade, Power Aid, Crystal Light, Talon Aid   Lemonade and Limeade   Bouillon, clear soup   Snowball, popsicles   YOU CAN'T DRINK ANYTHING RED   YOU CAN'T DRINK ALCOHOL   ONLY DRINK WHAT IS ON THIS List      At 5pm the night before your test:   Pour the 1st bottle of SUPREP into the cup provided in the box.  Add water to the line on the cup and mix well. Drink the whole cup and then drink 2 more full cups of water over the 1 hour.    This can be easier to drink if it is cold.  You can mix it 20 minutes ahead of time and place in the refrigerator before you drink it.  You must drink it within 30-45 minutes of mixing it. Do NOT  pour the drink over ice. You can drink it with a straw.    The Day of the test- We will call you 1 day before your test to tell you what time to get there.    5 hours before you come to the hospital (this may be the middle of the night)   Pour the 2nd bottle of SUPREP into to the cup provided in the box. Add water to the line on the cup and mix well. Drink the whole cup and then drink 2 more full cups of water over 1 hour.    It might be easier to drink if it is cold. You can mix it 20 minutes ahead of time and place in the refrigerator before you drink it. You must drink it within 30-45 minutes of mixing it. Do NOT pour the drink over ice. You can drink it with a straw.   YOU CAN'T EAT OR DRINK ANYTHING ELSE ONCE YOU FINISH THE PREP.   Leave all valuables and jewelry at home. You will be at the hospital for 2-4 hours.    Call the Endoscopy Scheduling Department at [phone] with any questions about your procedure.    Please  your medication from your local pharmacy. If you are unable to  the SUPREP Kit please contact our office.   Thank you   Endo Scheduling Dept   [facility]

## 2022-08-12 ENCOUNTER — TELEPHONE (OUTPATIENT)
Dept: ENDOCRINOLOGY | Age: 57
End: 2022-08-12

## 2022-08-15 NOTE — TELEPHONE ENCOUNTER
Left a message to contact the office. I left the office at noon on Friday which is why she did not get a return call.

## 2022-08-18 DIAGNOSIS — Z79.4 TYPE 2 DIABETES MELLITUS WITH DIABETIC NEPHROPATHY, WITH LONG-TERM CURRENT USE OF INSULIN (HCC): ICD-10-CM

## 2022-08-18 DIAGNOSIS — E11.21 TYPE 2 DIABETES MELLITUS WITH DIABETIC NEPHROPATHY, WITH LONG-TERM CURRENT USE OF INSULIN (HCC): ICD-10-CM

## 2022-08-18 DIAGNOSIS — Z79.4 TYPE 2 DIABETES MELLITUS WITHOUT COMPLICATION, WITH LONG-TERM CURRENT USE OF INSULIN (HCC): ICD-10-CM

## 2022-08-18 DIAGNOSIS — E11.9 TYPE 2 DIABETES MELLITUS WITHOUT COMPLICATION, WITH LONG-TERM CURRENT USE OF INSULIN (HCC): ICD-10-CM

## 2022-08-18 DIAGNOSIS — E11.3299 TYPE 2 DIABETES MELLITUS WITH BACKGROUND RETINOPATHY (HCC): ICD-10-CM

## 2022-08-18 RX ORDER — PIOGLITAZONEHYDROCHLORIDE 15 MG/1
TABLET ORAL
Qty: 30 TABLET | Refills: 5 | OUTPATIENT
Start: 2022-08-18

## 2022-08-18 NOTE — TELEPHONE ENCOUNTER
Requested Refill:   Requested Prescriptions     Pending Prescriptions Disp Refills    pioglitazone (ACTOS) 15 MG tablet [Pharmacy Med Name: PIOGLITAZONE HCL 15 MG TABLET] 30 tablet 5     Sig: TAKE 1 TABLET BY MOUTH EVERY DAY       Lat refilled: 1/6/2022 #30 with 5 refills     Last Appt: 4/25/2022  Next Appt: 10/3/2022    Cancelled 8/16/2022  No showed 7/25/2022

## 2022-09-30 DIAGNOSIS — E78.5 DYSLIPIDEMIA ASSOCIATED WITH TYPE 2 DIABETES MELLITUS (HCC): ICD-10-CM

## 2022-09-30 DIAGNOSIS — E11.69 DYSLIPIDEMIA ASSOCIATED WITH TYPE 2 DIABETES MELLITUS (HCC): ICD-10-CM

## 2022-09-30 DIAGNOSIS — E11.3299 TYPE 2 DIABETES MELLITUS WITH BACKGROUND RETINOPATHY (HCC): ICD-10-CM

## 2022-09-30 DIAGNOSIS — R82.90 ABNORMAL URINE ODOR: ICD-10-CM

## 2022-09-30 DIAGNOSIS — Z79.4 TYPE 2 DIABETES MELLITUS WITH DIABETIC NEPHROPATHY, WITH LONG-TERM CURRENT USE OF INSULIN (HCC): ICD-10-CM

## 2022-09-30 DIAGNOSIS — E11.21 TYPE 2 DIABETES MELLITUS WITH DIABETIC NEPHROPATHY, WITH LONG-TERM CURRENT USE OF INSULIN (HCC): ICD-10-CM

## 2022-09-30 DIAGNOSIS — I10 ESSENTIAL HYPERTENSION: ICD-10-CM

## 2022-09-30 LAB
A/G RATIO: 1.5 (ref 1.1–2.2)
ALBUMIN SERPL-MCNC: 4.1 G/DL (ref 3.4–5)
ALP BLD-CCNC: 190 U/L (ref 40–129)
ALT SERPL-CCNC: 22 U/L (ref 10–40)
ANION GAP SERPL CALCULATED.3IONS-SCNC: 16 MMOL/L (ref 3–16)
AST SERPL-CCNC: 17 U/L (ref 15–37)
BILIRUB SERPL-MCNC: <0.2 MG/DL (ref 0–1)
BUN BLDV-MCNC: 27 MG/DL (ref 7–20)
CALCIUM SERPL-MCNC: 9.5 MG/DL (ref 8.3–10.6)
CHLORIDE BLD-SCNC: 100 MMOL/L (ref 99–110)
CHOLESTEROL, FASTING: 187 MG/DL (ref 0–199)
CO2: 22 MMOL/L (ref 21–32)
CREAT SERPL-MCNC: 0.9 MG/DL (ref 0.6–1.1)
CREATININE URINE: 37.3 MG/DL (ref 28–259)
GFR AFRICAN AMERICAN: >60
GFR NON-AFRICAN AMERICAN: >60
GLUCOSE BLD-MCNC: 296 MG/DL (ref 70–99)
HDLC SERPL-MCNC: 68 MG/DL (ref 40–60)
LDL CHOLESTEROL CALCULATED: 107 MG/DL
MICROALBUMIN UR-MCNC: <1.2 MG/DL
MICROALBUMIN/CREAT UR-RTO: NORMAL MG/G (ref 0–30)
POTASSIUM SERPL-SCNC: 4.7 MMOL/L (ref 3.5–5.1)
SODIUM BLD-SCNC: 138 MMOL/L (ref 136–145)
TOTAL PROTEIN: 6.8 G/DL (ref 6.4–8.2)
TRIGLYCERIDE, FASTING: 59 MG/DL (ref 0–150)
TSH REFLEX FT4: 1.12 UIU/ML (ref 0.27–4.2)
VITAMIN D 25-HYDROXY: 29.9 NG/ML
VLDLC SERPL CALC-MCNC: 12 MG/DL

## 2022-10-01 LAB
ESTIMATED AVERAGE GLUCOSE: 260.4 MG/DL
HBA1C MFR BLD: 10.7 %

## 2022-10-03 ENCOUNTER — OFFICE VISIT (OUTPATIENT)
Dept: ENDOCRINOLOGY | Age: 57
End: 2022-10-03
Payer: COMMERCIAL

## 2022-10-03 VITALS
HEART RATE: 82 BPM | BODY MASS INDEX: 33.9 KG/M2 | OXYGEN SATURATION: 100 % | HEIGHT: 67 IN | DIASTOLIC BLOOD PRESSURE: 69 MMHG | WEIGHT: 216 LBS | SYSTOLIC BLOOD PRESSURE: 106 MMHG

## 2022-10-03 DIAGNOSIS — Z79.4 TYPE 2 DIABETES MELLITUS WITH DIABETIC NEPHROPATHY, WITH LONG-TERM CURRENT USE OF INSULIN (HCC): ICD-10-CM

## 2022-10-03 DIAGNOSIS — E55.9 VITAMIN D DEFICIENCY: ICD-10-CM

## 2022-10-03 DIAGNOSIS — E11.3299 TYPE 2 DIABETES MELLITUS WITH BACKGROUND RETINOPATHY (HCC): ICD-10-CM

## 2022-10-03 DIAGNOSIS — E11.9 TYPE 2 DIABETES MELLITUS WITHOUT COMPLICATION, WITH LONG-TERM CURRENT USE OF INSULIN (HCC): ICD-10-CM

## 2022-10-03 DIAGNOSIS — E11.21 TYPE 2 DIABETES MELLITUS WITH DIABETIC NEPHROPATHY, WITH LONG-TERM CURRENT USE OF INSULIN (HCC): ICD-10-CM

## 2022-10-03 DIAGNOSIS — I73.9 INTERMITTENT CLAUDICATION (HCC): Primary | ICD-10-CM

## 2022-10-03 DIAGNOSIS — Z79.4 TYPE 2 DIABETES MELLITUS WITHOUT COMPLICATION, WITH LONG-TERM CURRENT USE OF INSULIN (HCC): ICD-10-CM

## 2022-10-03 PROCEDURE — 1036F TOBACCO NON-USER: CPT | Performed by: INTERNAL MEDICINE

## 2022-10-03 PROCEDURE — 3017F COLORECTAL CA SCREEN DOC REV: CPT | Performed by: INTERNAL MEDICINE

## 2022-10-03 PROCEDURE — 2022F DILAT RTA XM EVC RTNOPTHY: CPT | Performed by: INTERNAL MEDICINE

## 2022-10-03 PROCEDURE — G8427 DOCREV CUR MEDS BY ELIG CLIN: HCPCS | Performed by: INTERNAL MEDICINE

## 2022-10-03 PROCEDURE — G9899 SCRN MAM PERF RSLTS DOC: HCPCS | Performed by: INTERNAL MEDICINE

## 2022-10-03 PROCEDURE — 3046F HEMOGLOBIN A1C LEVEL >9.0%: CPT | Performed by: INTERNAL MEDICINE

## 2022-10-03 PROCEDURE — G8484 FLU IMMUNIZE NO ADMIN: HCPCS | Performed by: INTERNAL MEDICINE

## 2022-10-03 PROCEDURE — 99214 OFFICE O/P EST MOD 30 MIN: CPT | Performed by: INTERNAL MEDICINE

## 2022-10-03 PROCEDURE — G8417 CALC BMI ABV UP PARAM F/U: HCPCS | Performed by: INTERNAL MEDICINE

## 2022-10-03 RX ORDER — METFORMIN HYDROCHLORIDE 500 MG/1
TABLET, EXTENDED RELEASE ORAL
Qty: 120 TABLET | Refills: 5 | Status: SHIPPED | OUTPATIENT
Start: 2022-10-03

## 2022-10-03 RX ORDER — TIRZEPATIDE 2.5 MG/.5ML
INJECTION, SOLUTION SUBCUTANEOUS
Qty: 1 ADJUSTABLE DOSE PRE-FILLED PEN SYRINGE | Refills: 0 | COMMUNITY
Start: 2022-10-03

## 2022-10-03 RX ORDER — ERGOCALCIFEROL (VITAMIN D2) 1250 MCG
50000 CAPSULE ORAL
Qty: 26 CAPSULE | Refills: 1 | Status: SHIPPED | OUTPATIENT
Start: 2022-10-03 | End: 2022-11-02

## 2022-10-03 RX ORDER — SEMAGLUTIDE 2.68 MG/ML
INJECTION, SOLUTION SUBCUTANEOUS
Qty: 3 ML | Refills: 3 | Status: SHIPPED | OUTPATIENT
Start: 2022-10-03

## 2022-10-03 NOTE — PROGRESS NOTES
Patient ID:   Nito Medina is a 65 y.o. female    Chief Complaint:   Nito Medina presents for an evaluation of Type 2 Diabetes Mellitus , Hyperlipidemia and hypertension. Subjective:   Type 2 Diabetes Mellitus diagnosed at age 30-32. On insulin since Nov 2017. Invokana caused UTI in past.     She stopped [place] for cramping. She did not like Basalar or Lantus. She did not like Trulicity as much as Ozempic     Metformin ER 500mg, two tabs twice daily       Actos 15 mg daily   Jardiance 25mg daily. She takes half in am and half at dinner. She likes it as it keeps sugars under control in am.  H/o UTI's. Last episode 2 months ago, got anti biotics from urgent care. Ozempic 2 mg weekly on Saturdays. Out of refills for last 2 months      Weight is going down     Did not bring meter     Checks blood sugars 1 times per day. 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 Occasional snacks (chips, pretzels). Diet pepsi. Exercise: Walk around the work during lunch break. Denies chest pain, exertional dyspnea. Family history of CAD: strokes in multiple family members (aunts and uncles)    Denies smoking/alcohol.    Currently on ASA 81 mg daily     The following portions of the patient's history were reviewed and updated as appropriate:       Family History   Problem Relation Age of Onset    Diabetes Mother     High Blood Pressure Mother     High Cholesterol Mother     Uterine Cancer Mother     High Blood Pressure Father     Other Father         cardiac amyloidosis    Heart Disease Sister     Diabetes Maternal Aunt     Stroke Maternal Aunt     Heart Disease Maternal Uncle     Cancer Maternal Uncle     Cancer Maternal Grandmother     Diabetes Maternal Grandfather     Diabetes Maternal Aunt     Stroke Maternal Aunt     Diabetes Maternal Aunt     Stroke Maternal Aunt     Heart Disease Maternal Aunt     No colonoscopy, mennorhagia    Iron deficiency anemia 11/22/2020    Rupture of biceps tendon 6/19/2017    Type II or unspecified type diabetes mellitus without mention of complication, not stated as uncontrolled     Urogenital trichomoniasis        Past Surgical History:   Procedure Laterality Date    CATARACT EXTRACTION W/  INTRAOCULAR LENS IMPLANT      COLONOSCOPY  5/23/16    incomplete, Dr John Butt EXTRACTION         No Known Allergies      Current Outpatient Medications:     JARDIANCE 25 MG tablet, TAKE 1 TABLET BY MOUTH EVERY DAY, Disp: 30 tablet, Rfl: 5    metFORMIN (GLUCOPHAGE-XR) 500 MG extended release tablet, TAKE 2 TABLETS BY MOUTH TWICE A DAY WITH MEALS, Disp: 120 tablet, Rfl: 5    Semaglutide, 2 MG/DOSE, (OZEMPIC, 2 MG/DOSE,) 8 MG/3ML SOPN, 2mg subcutaneously every week, Disp: 3 mL, Rfl: 3    Continuous Blood Gluc  (FREESTYLE PREET 2 READER) MARTI, Use for personal CGMS. On Multiple insulin shots, checks blood sugars 4 times per day and requires frequent insulin adjustment. , Disp: 1 each, Rfl: 0    Continuous Blood Gluc Sensor (FREESTYLE PREET 2 SENSOR) MISC, Replace every 2 weeks, Disp: 2 each, Rfl: 5    losartan (COZAAR) 100 MG tablet, TAKE 1 TABLET BY MOUTH EVERY DAY, Disp: 30 tablet, Rfl: 5    hydroCHLOROthiazide (HYDRODIURIL) 25 MG tablet, TAKE 1 TABLET BY MOUTH EVERY DAY, Disp: 30 tablet, Rfl: 5    fluticasone (FLONASE) 50 MCG/ACT nasal spray, SPRAY 1 SPRAY INTO EACH NOSTRIL EVERY DAY, Disp: 16 g, Rfl: 5    fexofenadine-pseudoephedrine (ALLEGRA-D 24HR) 180-240 MG per extended release tablet, Take 1 tablet by mouth as needed (allergies and sinus pressure.), Disp: 20 tablet, Rfl: 1    pioglitazone (ACTOS) 15 MG tablet, TAKE 1 TABLET BY MOUTH EVERY DAY, Disp: 30 tablet, Rfl: 5    lactulose (CHRONULAC) 10 GM/15ML solution, TAKE 8-15 MLS BY MOUTH NIGHTLY AS NEEDED (CONSITPATION), Disp: 473 mL, Rfl: 2    omeprazole (PRILOSEC) 40 MG delayed release capsule, TAKE 1 CAPSULE BY MOUTH EVERY DAY, Disp: 30 capsule, Rfl: 3    docusate sodium (COLACE) 100 MG capsule, Take 1 capsule by mouth 2 times daily as needed for Constipation, Disp: 50 capsule, Rfl: 1    Psyllium (METAMUCIL FIBER PO), Take by mouth as needed , Disp: , Rfl:     spironolactone (ALDACTONE) 50 MG tablet, TAKE 1 TABLET BY MOUTH TWICE A DAY, Disp: 180 tablet, Rfl: 3    ergocalciferol (DRISDOL) 1.25 MG (16635 UT) capsule, Take 1 capsule by mouth once a week, Disp: 12 capsule, Rfl: 1    ASPIRIN LOW DOSE 81 MG EC tablet, TAKE 1 TABLET BY MOUTH EVERY DAY, Disp: 30 tablet, Rfl: 11    celecoxib (CELEBREX) 200 MG capsule, TAKE 1 CAPSULE BY MOUTH EVERY DAY, Disp: 30 capsule, Rfl: 2    B-D ULTRAFINE III SHORT PEN 31G X 8 MM MISC, USE AS DIRECTED TO INJECT INSULIN EVERY DAY, Disp: , Rfl: 3      Review of Systems:    Constitutional: Negative for fever, chills, and unexpected weight change. HENT: Negative for congestion, ear pain, rhinorrhea,  sore throat and trouble swallowing. Eyes: Negative for photophobia, redness, itching. Respiratory: Negative for cough, shortness of breath and sputum. Cardiovascular: Negative for chest pain, palpitations and leg swelling. Gastrointestinal: Negative for nausea, vomiting, abdominal pain, diarrhea, constipation. Endocrine: Negative for cold intolerance, heat intolerance, polydipsia, polyphagia and polyuria. Genitourinary: Negative for dysuria, urgency, frequency, hematuria and flank pain. Musculoskeletal: Negative for myalgias, back pain, arthralgias and neck pain. Skin/Nail: Negative for rash, itching. Normal nails. Neurological: Negative for seizures, weakness, light-headedness, numbness and headaches. Hematological/ Lymph nodes: Negative for adenopathy. Does not bruise/bleed easily. Psychiatric/Behavioral: Negative for suicidal ideas, depression, anxiety, sleep disturbance and decreased concentration.         Objective:   Physical Exam:  There were no vitals taken for this visit. Constitutional: Patient is oriented to person, place, and time. Patient appears well-developed and well-nourished. Pulmonary/Chest: Effort normal.   Neurological: Patient is alert and oriented to person, place, and time. Psychiatric: Patient has a normal mood and affect.  Patient behavior is normal.     Lab Review:    Orders Only on 09/30/2022   Component Date Value Ref Range Status    TSH Reflex FT4 09/30/2022 1.12  0.27 - 4.20 uIU/mL Final    Vit D, 25-Hydroxy 09/30/2022 29.9 (A)  >=30 ng/mL Final    Microalbumin, Random Urine 09/30/2022 <1.20  <2.0 mg/dL Final    Creatinine, Ur 09/30/2022 37.3  28.0 - 259.0 mg/dL Final    Microalbumin Creatinine Ratio 09/30/2022 see below  0.0 - 30.0 mg/g Final    Sodium 09/30/2022 138  136 - 145 mmol/L Final    Potassium 09/30/2022 4.7  3.5 - 5.1 mmol/L Final    Chloride 09/30/2022 100  99 - 110 mmol/L Final    CO2 09/30/2022 22  21 - 32 mmol/L Final    Anion Gap 09/30/2022 16  3 - 16 Final    Glucose 09/30/2022 296 (A)  70 - 99 mg/dL Final    BUN 09/30/2022 27 (A)  7 - 20 mg/dL Final    Creatinine 09/30/2022 0.9  0.6 - 1.1 mg/dL Final    GFR Non- 09/30/2022 >60  >60 Final    GFR  09/30/2022 >60  >60 Final    Calcium 09/30/2022 9.5  8.3 - 10.6 mg/dL Final    Total Protein 09/30/2022 6.8  6.4 - 8.2 g/dL Final    Albumin 09/30/2022 4.1  3.4 - 5.0 g/dL Final    Albumin/Globulin Ratio 09/30/2022 1.5  1.1 - 2.2 Final    Total Bilirubin 09/30/2022 <0.2  0.0 - 1.0 mg/dL Final    Alkaline Phosphatase 09/30/2022 190 (A)  40 - 129 U/L Final    ALT 09/30/2022 22  10 - 40 U/L Final    AST 09/30/2022 17  15 - 37 U/L Final    Cholesterol, Fasting 09/30/2022 187  0 - 199 mg/dL Final    Triglyceride, Fasting 09/30/2022 59  0 - 150 mg/dL Final    HDL 09/30/2022 68 (A)  40 - 60 mg/dL Final    LDL Calculated 09/30/2022 107 (A)  <100 mg/dL Final    VLDL Cholesterol Calculated 09/30/2022 12  Not Established mg/dL Final    Hemoglobin A1C 09/30/2022 10.7 See comment % Final    eAG 09/30/2022 260.4  mg/dL Final   Orders Only on 06/29/2022   Component Date Value Ref Range Status    Visual Acuity Distance Right Eye 06/29/2022 20/25   Final    Visual Acuity Distance Left Eye 06/29/2022 20/25   Final    Intraocular Pressure Eye 06/29/2022    Final                    Value:13  14      Diabetic Retinopathy 06/29/2022 POSITIVE - MONITOR   Final    Cataracts 06/29/2022 POSITIVE   Final    Glaucoma 06/29/2022 NEGATIVE   Final   Office Visit on 03/11/2022   Component Date Value Ref Range Status    Organism 03/11/2022 Citrobacter koseri (A)   Final    Urine Culture, Routine 03/11/2022 >100,000 CFU/ml   Final   Orders Only on 01/07/2022   Component Date Value Ref Range Status    Hemoglobin A1C 01/07/2022 7.1  See comment % Final    eAG 01/07/2022 157.1  mg/dL Final           Assessment and Plan     There are no diagnoses linked to this encounter. 1: Type 2 DM complicated with nephropathy, retinopathy   Uncontrolled A1C 10.7% < 7.1% < 8.4% <  9% <  8.1% < 9.4% < 8.9% < 8.8% < 8.9 % < 8.7% < 9.7%     A1C is unreliable with iron deficiency anemia   Sister and mother also have anemia     Fructosamine 383 eq to A1C of 9.6%. consistent with A1C     Hb electrophoresis normal May 2021   Blood sugars need to be checked   She said she will pay for [facility] [place] sensor. Rx sent to [facility] as well as to [facility]    She needs to check more frequent blood sugars, 3 x per day     C/w   Metformin ER 500mg two tabs twice a day   Actos 15 mg daily   Jardiance 25mg daily. Restart Ozempic 2 mg weekly. . If blood sugars start running better, change jardiance to half tab in am     Giving samples of Mounjaro 2.5 mg   If she calls me I will send higher dose of 5 mg weekly     She is still getting yeast infection. Recommend stopping it. For UTI see PCP     If blood sugars are still high start DAVIS     All instructions provided in written. Check Blood sugars 3 times per day.  Log them along with insulin and send them every 2 weeks. Call for blood sugars less than 60 or more than 400. Eye exam: Last exam in June 2022. DR bradshaw, stable. Follow up in 6 months. Foot exam:  Oct 2022. dystrophic nail with fungal infection of toes, extending to skin on left foot   Deformity/amputation: absent  Skin lesions/pre-ulcerative calluses: absent  Edema: right- negative, left- negative  Sensory exam: Monofilament sensation: normal  Pulses: normal, Vibration (128 Hz): mildly impaired     Renal screen: Normal  Sep 2022      TSH screen: Sep 2022      2: HTN   Controlled at home     3: Hyperlipidemia   LDL: 107, HDL: 68, TGs: 59 - Sep 2022      She stopped statins due to muscle cramps. She could not not tolerate Crestor 10mg, every other day. Recheck later and if Vit D > 50 start once weekly crestor     Vit D Def: 29.9 - Sep 2022    C/w ergocalciferol 50k units weekly     4: Hirsutism, since 20's   On face only   Runs in the family   Gonadotropins suggest pre menopause status   Free T high 5.8 (N 0.6-3.8), TT normal 24 July 2018   Free T 4.0 high June 2020. Spironolactone 50mg twice daily, this dose is helping on facial hair. She thinks she it has helped. 5: Morbid obesity   Calorie target <1300 per day   Need to work on diet, exercise and lose weight   Lifestyle changes failed for meaningful weight loss   Belviq 10mg bid , was stared in May 2019 - stopped due to recall     6: Intermittent claudication   Refer to Dr. Altagracia Reed     RTC in 3 months with A1C, Vit D     Patient is aware that refills are on the visit     EDUCATION:   Greater than 50% of this follow-up visit was spent in general counseling regarding   obesity, diet, exercise, importance of adherence to insulin regime, recognition and treatment of hypo and hyperglycemia,  glucose logging, proper diabetes management, diabetic complications with poor management and the importance of glycemic control in order to avoid the complications of diabetes.     Risks and potential complications of diabetes were reviewed with the patient. Diabetes health maintenance plan and follow-up were discussed and understood by the patient. We reviewed the importance of medication compliance and regular follow-up. Aggressive lifestyle modification was encouraged. Exercise Counselling: This patient is a candidate for regular physical exercise. Instructions to perform the following types of exercise:  Swimming or water aerobic exercise  Brisk walking  Playing tennis  Stationary bicycle or elliptical indoor  Low impact aerobic exercise    Instructions given to exercise for the following duration:  30 minutes a day for five-seven days per week.     Following instructions for being active throughout the day in addition to formal exercise:  Walk instead of drive whenever possible  Take the stairs instead of the elevator  Work in the garden  Park to the far end of the parking lot to add more walking steps to destination      Electronically signed by Chante Lucas MD on 10/3/2022 at 12:36 PM

## 2022-10-05 DIAGNOSIS — I10 ESSENTIAL HYPERTENSION: ICD-10-CM

## 2022-10-05 RX ORDER — HYDROCHLOROTHIAZIDE 25 MG/1
TABLET ORAL
Qty: 30 TABLET | Refills: 5 | Status: SHIPPED | OUTPATIENT
Start: 2022-10-05

## 2022-10-05 NOTE — TELEPHONE ENCOUNTER
Medication:   Requested Prescriptions     Pending Prescriptions Disp Refills    hydroCHLOROthiazide (HYDRODIURIL) 25 MG tablet [Pharmacy Med Name: HYDROCHLOROTHIAZIDE 25 MG TAB] 30 tablet 5     Sig: TAKE 1 TABLET BY MOUTH EVERY DAY        Last Filled:      Patient Phone Number: [phone] (home) [phone] (work)    Last appt: 10/3/22  Next appt: 1/9/23    Last OARRS: No flowsheet data found.

## 2022-10-07 ENCOUNTER — HOSPITAL ENCOUNTER (EMERGENCY)
Age: 57
Discharge: HOME | End: 2022-10-07
Attending: EMERGENCY MEDICINE
Payer: COMMERCIAL

## 2022-10-07 VITALS
RESPIRATION RATE: 18 BRPM | TEMPERATURE: 97.7 F | HEART RATE: 76 BPM | SYSTOLIC BLOOD PRESSURE: 128 MMHG | DIASTOLIC BLOOD PRESSURE: 76 MMHG | OXYGEN SATURATION: 99 %

## 2022-10-07 DIAGNOSIS — M79.605 BILATERAL LEG PAIN: Primary | ICD-10-CM

## 2022-10-07 DIAGNOSIS — M79.604 BILATERAL LEG PAIN: Primary | ICD-10-CM

## 2022-10-07 DIAGNOSIS — M25.559 PAIN IN JOINT INVOLVING PELVIC REGION AND THIGH, UNSPECIFIED LATERALITY: ICD-10-CM

## 2022-10-07 PROCEDURE — 99283 EMERGENCY DEPT VISIT LOW MDM: CPT

## 2022-10-07 ASSESSMENT — PAIN - FUNCTIONAL ASSESSMENT: PAIN_FUNCTIONAL_ASSESSMENT: NONE - DENIES PAIN

## 2022-10-07 NOTE — DISCHARGE INSTRUCTIONS
You were seen for leg pain today. You were given an order for ankle-brachial index which is a test looking at the blood flow to your legs. You should talk to your doctor about the results of this test.    You also may need additional testing such as an MRI of your spine which you do not usually do in the emergency department however if you talk to your doctor about this test they may order it.

## 2022-10-07 NOTE — ED PROVIDER NOTES
ED Attending Attestation Note     Date of evaluation: 10/7/2022    This patient was seen by the resident. I have seen and examined the patient, agree with the workup, evaluation, management and diagnosis. The care plan has been discussed. My assessment reveals 69-year-old female who presents with a chief complaint of leg pain. Patient with pain in her bilateral legs down the back of her her legs starting at her but to her feet. Starts right upon standing but then is worse with walking up hills. States she has an appointment with vascular surgery in November but wanted answers before then. Patient with good distal pulses, normal ambulation. Ewa Coleman MD  10/07/22 [id number]

## 2022-10-07 NOTE — ED PROVIDER NOTES
[address] [facility] [address] ENCOUNTER          EM RESIDENT NOTE       Date of evaluation: 10/7/2022    Chief Complaint     Leg Pain (Bilateral legs, states from her butt down to her knees, states gets tired fast with ambulation )      of Present Illness     Tosin Pa is a 65 y.o. female history of diabetes, hypertension, hyperlipidemia who presents bilateral leg pain. Patient has had ongoing bilateral leg pain for months. She states the pain is worse when she is up and walking around. States it feels like it comes from her thighs and radiates down her legs. She does have intermittent back pain. She was seen by her primary care physician earlier this week who thought it was due to claudication, recommended that she was evaluated vascular surgery however she was only able to get an appointment in November. She denies any recent falls or trauma. She has no numbness or weakness in her lower extremities. When she is sitting she is asymptomatic. Review of Systems     Review of Systems   All other systems reviewed and are negative. Past Medical, Surgical, Family, and Social History     She has a past medical history of Arthritis of left acromioclavicular joint, Cataract, Complete tear of left rotator cuff, COVID, Essential hypertension, Fibroid, Fibroids, Iron deficiency anemia, Iron deficiency anemia, Rupture of biceps tendon, Type II or unspecified type diabetes mellitus without mention of complication, not stated as uncontrolled, and Urogenital trichomoniasis. She has a past surgical history that includes [place] tooth extraction; Colonoscopy (5/23/16); and Cataract removal with implant.   Her family history includes Cancer in her maternal grandmother and maternal uncle; Diabetes in her maternal aunt, maternal aunt, maternal aunt, maternal grandfather, and mother; Heart Disease in her maternal aunt, maternal uncle, and sister; High Blood Pressure in her father and mother; High Cholesterol in her mother; No Known Problems in her brother, paternal aunt, paternal grandfather, paternal grandmother, paternal uncle, and another family member; Other in her father; Stroke in her maternal aunt, maternal aunt, and maternal aunt; Uterine Cancer in her mother. She reports that she quit smoking about 12 years ago. Her smoking use included cigarettes. She has a 2.50 pack-year smoking history. She quit smokeless tobacco use about 8 years ago. She reports current alcohol use. She reports that she does not use drugs.     Medications     Discharge Medication List as of 10/7/2022  5:58 PM        CONTINUE these medications which have NOT CHANGED    Details   hydroCHLOROthiazide (HYDRODIURIL) 25 MG tablet TAKE 1 TABLET BY MOUTH EVERY DAY, Disp-30 tablet, R-5Normal      Semaglutide, 2 MG/DOSE, (OZEMPIC, 2 MG/DOSE,) 8 MG/3ML SOPN 2mg subcutaneously every week, Disp-3 mL, R-3Normal      empagliflozin (JARDIANCE) 25 MG tablet One tab daily, Disp-30 tablet, R-5Normal      metFORMIN (GLUCOPHAGE-XR) 500 MG extended release tablet TAKE 2 TABLETS BY MOUTH TWICE A DAY WITH MEALS, Disp-120 tablet, R-5Normal      ergocalciferol (DRISDOL) 1.25 MG (26759 UT) capsule Take 1 capsule by mouth Twice a Week, Disp-26 capsule, R-1Normal      Tirzepatide (MOUNJARO) 2.5 MG/0.5ML SOPN SC injection Lot # [id number] Ex 10/4/2023, Disp-1 Adjustable Dose Pre-filled Pen Syringe, R-0Sample      fluticasone (FLONASE) 50 MCG/ACT nasal spray SPRAY 1 SPRAY INTO EACH NOSTRIL EVERY DAY, Disp-16 g, R-5, DAWNormal      fexofenadine-pseudoephedrine (ALLEGRA-D 24HR) 180-240 MG per extended release tablet Take 1 tablet by mouth as needed (allergies and sinus pressure.), Disp-20 tablet, R-1Normal      pioglitazone (ACTOS) 15 MG tablet TAKE 1 TABLET BY MOUTH EVERY DAY, Disp-30 tablet, R-5Normal      lactulose (CHRONULAC) 10 GM/15ML solution TAKE 8-15 MLS BY MOUTH NIGHTLY AS NEEDED (CONSITPATION), Disp-473 mL, R-2Normal      omeprazole (PRILOSEC) 40 MG delayed release capsule TAKE 1 CAPSULE BY MOUTH EVERY DAY, Disp-30 capsule, R-3Normal      docusate sodium (COLACE) 100 MG capsule Take 1 capsule by mouth 2 times daily as needed for Constipation, Disp-50 capsule, R-1Normal      Psyllium (METAMUCIL FIBER PO) Take by mouth as needed Historical Med      spironolactone (ALDACTONE) 50 MG tablet TAKE 1 TABLET BY MOUTH TWICE A DAY, Disp-180 tablet, R-3Normal      ASPIRIN LOW DOSE 81 MG EC tablet TAKE 1 TABLET BY MOUTH EVERY DAY, Disp-30 tablet, R-11Normal      celecoxib (CELEBREX) 200 MG capsule TAKE 1 CAPSULE BY MOUTH EVERY DAY, Disp-30 capsule, R-2Normal      B-D ULTRAFINE III SHORT PEN 31G X 8 MM MISC R-3, KEVEN, Historical Med             Allergies     She has No Known Allergies. Physical Exam     INITIAL VITALS: BP: 124/74, Temp: 97.7 °F (36.5 °C), Heart Rate: 85, Resp: 18, SpO2: 99 %   Physical Exam  Constitutional:       Appearance: Normal appearance. HENT:      Head: Normocephalic and atraumatic. Nose: Nose normal. No congestion. Mouth/Throat:      Mouth: Mucous membranes are moist.      Pharynx: Oropharynx is clear. Eyes:      Extraocular Movements: Extraocular movements intact. Pupils: Pupils are equal, round, and reactive to light. Cardiovascular:      Rate and Rhythm: Normal rate and regular rhythm. Pulses: Normal pulses. Heart sounds: Normal heart sounds. Pulmonary:      Effort: Pulmonary effort is normal.   Abdominal:      General: Abdomen is flat. Palpations: Abdomen is soft. Musculoskeletal:         General: Normal range of motion. Cervical back: Normal range of motion and neck supple. Right lower leg: No edema. Left lower leg: No edema. Comments: No lower extremity edema. 2+ DP and PT pulses bilaterally. 5 out of 5 strength in lower extremities. Sensation intact. Skin:     General: Skin is warm and dry. Capillary Refill: Capillary refill takes less than 2 seconds.    Neurological: General: No focal deficit present. Mental Status: She is alert. Mental status is at baseline. Gait: Gait normal.       DiagnosticResults     EKG   No EKG    RADIOLOGY:  VL JEFF BILATERAL LIMITED 1-2 LEVELS    (Results Pending)       LABS:   No results found for this visit on 10/07/22. ED BEDSIDE ULTRASOUND:  No results found. RECENT VITALS:  BP: 128/76, Temp: 97.7 °F (36.5 °C), Heart Rate: 76,Resp: 18, SpO2: 99 %     Procedures         ED Course     Nursing Notes, Past Medical Hx, Past Surgical Hx, Social Hx, Allergies, and Family Hx were reviewed. The patient was given the followingmedications:  No orders of the defined types were placed in this encounter. CONSULTS:  None    MEDICAL DECISION MAKING / ASSESSMENT / Dasia Agustin is a 65 y.o. female presenting with bilateral leg pain that has been going on for months. She was evaluated by her primary care physician, believed to be intermittent claudication. I have lower suspicion for claudication as patient has a pain immediately upon standing. This may be more consistent with sciatica or low back pain. She has no evidence of neurologic compromise low suspicion for cauda equina syndrome. She has had no back trauma. Pulses are 2+ DP and PT bilaterally. Sensation intact. This may also be related to neuropathy given patient's diabetes that has been difficult to control. Patient's strength is 5 out of 5 in her bilateral lower extremities. I did order ABIs for the patient as she does have follow-up with vascular surgery. I also instructed the patient to follow-up with her primary care physician as she may need an MRI of her lumbar spine to assess for low back pain as the cause of her leg pain. This patient was also evaluated by the attending physician. All care plans werediscussed and agreed upon. Clinical Impression     1. Bilateral leg pain    2.  Pain in joint involving pelvic region and thigh, unspecified laterality        Disposition     PATIENT REFERRED TO:  Lucía Duke MD  [address] [place] [address]  [phone]    Schedule an appointment as soon as possible for a visit in 2 days      DISCHARGE MEDICATIONS:  Discharge Medication List as of 10/7/2022  5:58 PM          DISPOSITION Decision To Discharge 10/07/2022 05:42:28 PM       Florin Koroma MD  Resident  10/08/22 [id number]

## 2022-10-07 NOTE — ED NOTES
--Patient provided with discharge instructions and any prescriptions. --Instructions, dosing, and follow-up appointments reviewed with patient/family. No further questions or needs at this time. --Vital signs and patient stable upon discharge. --[facility]        Christopher Chi RN  10/07/22 2007

## 2022-10-07 NOTE — ED NOTES
PT ambulatory with steady gait and without use of assistive devices. Pt denies any lightheaded or dizziness.       Papito Ye RN  10/07/22 1838

## 2022-10-14 ENCOUNTER — HOSPITAL ENCOUNTER (OUTPATIENT)
Dept: VASCULAR LAB | Age: 57
Discharge: HOME | End: 2022-10-14
Payer: COMMERCIAL

## 2022-10-14 DIAGNOSIS — M25.559 PAIN IN JOINT INVOLVING PELVIC REGION AND THIGH, UNSPECIFIED LATERALITY: ICD-10-CM

## 2022-10-14 PROCEDURE — 93922 UPR/L XTREMITY ART 2 LEVELS: CPT

## 2022-10-21 ENCOUNTER — OFFICE VISIT (OUTPATIENT)
Dept: INTERNAL MEDICINE CLINIC | Age: 57
End: 2022-10-21
Payer: COMMERCIAL

## 2022-10-21 VITALS
SYSTOLIC BLOOD PRESSURE: 112 MMHG | BODY MASS INDEX: 33.21 KG/M2 | HEIGHT: 67 IN | HEART RATE: 92 BPM | OXYGEN SATURATION: 98 % | WEIGHT: 211.6 LBS | TEMPERATURE: 97.6 F | DIASTOLIC BLOOD PRESSURE: 60 MMHG

## 2022-10-21 DIAGNOSIS — R29.898 RIGHT LEG WEAKNESS: ICD-10-CM

## 2022-10-21 DIAGNOSIS — I10 ESSENTIAL HYPERTENSION: ICD-10-CM

## 2022-10-21 DIAGNOSIS — M54.41 CHRONIC MIDLINE LOW BACK PAIN WITH BILATERAL SCIATICA: ICD-10-CM

## 2022-10-21 DIAGNOSIS — K59.09 CHRONIC CONSTIPATION: ICD-10-CM

## 2022-10-21 DIAGNOSIS — E11.21 TYPE 2 DIABETES MELLITUS WITH DIABETIC NEPHROPATHY, WITH LONG-TERM CURRENT USE OF INSULIN (HCC): ICD-10-CM

## 2022-10-21 DIAGNOSIS — Z11.59 ENCOUNTER FOR HEPATITIS C SCREENING TEST FOR LOW RISK PATIENT: ICD-10-CM

## 2022-10-21 DIAGNOSIS — G89.29 CHRONIC MIDLINE LOW BACK PAIN WITH BILATERAL SCIATICA: ICD-10-CM

## 2022-10-21 DIAGNOSIS — D50.9 IRON DEFICIENCY ANEMIA, UNSPECIFIED IRON DEFICIENCY ANEMIA TYPE: Primary | ICD-10-CM

## 2022-10-21 DIAGNOSIS — Z79.4 TYPE 2 DIABETES MELLITUS WITH DIABETIC NEPHROPATHY, WITH LONG-TERM CURRENT USE OF INSULIN (HCC): ICD-10-CM

## 2022-10-21 DIAGNOSIS — M54.42 CHRONIC MIDLINE LOW BACK PAIN WITH BILATERAL SCIATICA: ICD-10-CM

## 2022-10-21 PROCEDURE — 90739 HEPB VACC 2/4 DOSE ADULT IM: CPT | Performed by: INTERNAL MEDICINE

## 2022-10-21 PROCEDURE — G8417 CALC BMI ABV UP PARAM F/U: HCPCS | Performed by: INTERNAL MEDICINE

## 2022-10-21 PROCEDURE — 3017F COLORECTAL CA SCREEN DOC REV: CPT | Performed by: INTERNAL MEDICINE

## 2022-10-21 PROCEDURE — G8484 FLU IMMUNIZE NO ADMIN: HCPCS | Performed by: INTERNAL MEDICINE

## 2022-10-21 PROCEDURE — G9899 SCRN MAM PERF RSLTS DOC: HCPCS | Performed by: INTERNAL MEDICINE

## 2022-10-21 PROCEDURE — 2022F DILAT RTA XM EVC RTNOPTHY: CPT | Performed by: INTERNAL MEDICINE

## 2022-10-21 PROCEDURE — G8427 DOCREV CUR MEDS BY ELIG CLIN: HCPCS | Performed by: INTERNAL MEDICINE

## 2022-10-21 PROCEDURE — 99214 OFFICE O/P EST MOD 30 MIN: CPT | Performed by: INTERNAL MEDICINE

## 2022-10-21 PROCEDURE — 1036F TOBACCO NON-USER: CPT | Performed by: INTERNAL MEDICINE

## 2022-10-21 PROCEDURE — 90471 IMMUNIZATION ADMIN: CPT | Performed by: INTERNAL MEDICINE

## 2022-10-21 PROCEDURE — 3046F HEMOGLOBIN A1C LEVEL >9.0%: CPT | Performed by: INTERNAL MEDICINE

## 2022-10-21 SDOH — ECONOMIC STABILITY: FOOD INSECURITY: WITHIN THE PAST 12 MONTHS, THE FOOD YOU BOUGHT JUST DIDN'T LAST AND YOU DIDN'T HAVE MONEY TO GET MORE.: NEVER TRUE

## 2022-10-21 SDOH — ECONOMIC STABILITY: FOOD INSECURITY: WITHIN THE PAST 12 MONTHS, YOU WORRIED THAT YOUR FOOD WOULD RUN OUT BEFORE YOU GOT MONEY TO BUY MORE.: NEVER TRUE

## 2022-10-21 ASSESSMENT — SOCIAL DETERMINANTS OF HEALTH (SDOH): HOW HARD IS IT FOR YOU TO PAY FOR THE VERY BASICS LIKE FOOD, HOUSING, MEDICAL CARE, AND HEATING?: NOT HARD AT ALL

## 2022-10-21 NOTE — PROGRESS NOTES
Tosin JOSE ALBERTO Ember   :  1965  10/21/2022    ASSESSMENT/PLAN:   1. Iron deficiency anemia, unspecified iron deficiency anemia type  Assessment & Plan:  Asymptomatic. No menorrhagia in approximately 10 months. Not taking iron due to constipation. Recheck CBC and iron studies today to assure complete resolution. Orders:  -     CBC; Future  -     Iron and TIBC; Future  -     Ferritin; Future  2. Chronic midline low back pain with bilateral sciatica  Assessment & Plan:  Neurogenic claudication. Recent vascular studies normal.  Also with some weakness of right ankle dorsiflexion. MRI lumbar spine for further evaluation. Orders:  -     MRI LUMBAR SPINE WO CONTRAST; Future  3. Right leg weakness  Comments:  Dorsiflexion right ankle, MRI lumbar spine, as above. Orders:  -     MRI LUMBAR SPINE WO CONTRAST; Future  4. Chronic constipation  Assessment & Plan:  Stable with current OTC regimen  5. Essential hypertension  Assessment & Plan:  Well-controlled, continue same medications. 6. Type 2 diabetes mellitus with diabetic nephropathy, with long-term current use of insulin ([facility] [address]  Assessment & Plan:  Managed by endocrine. Recent A1c elevated, had been out of Ozempic x2 months. Med changed to [facility] per endocrinologist.  7. Encounter for hepatitis C screening test for low risk patient  -     Hepatitis C Antibody; Future      Return in about 6 months (around 2023). SUBJECTIVE     65 y.o. female established patient here for:   Chief Complaint   Patient presents with    Follow-up     Patient already has flu shot, lower back and leg pain       Anemia, iron defic, LMP. No period since last December. Low back pain and pulls into back of legs and knees. Worst going from sitting to standing, and then with walking. Doesn't awaken but hurts to turn. Some discomfort with sitting as well but more mild.    Has appointment with vascular 11/10 but lower extremity  vascular study was normal.   Went to stretch lab which helps but hasn't stopped it. Diabetes not as good because hasn't been able to be as active. Pharmacy was out of ozempic so hasn't had for 2 months. Endo gave her mounjaro  now.      Review of Systems    Outpatient Medications Marked as Taking for the 10/21/22 encounter (Office Visit) with Luda Moreno MD   Medication Sig Dispense Refill    hydroCHLOROthiazide (HYDRODIURIL) 25 MG tablet TAKE 1 TABLET BY MOUTH EVERY DAY 30 tablet 5    Semaglutide, 2 MG/DOSE, (OZEMPIC, 2 MG/DOSE,) 8 MG/3ML SOPN 2mg subcutaneously every week 3 mL 3    empagliflozin (JARDIANCE) 25 MG tablet One tab daily 30 tablet 5    metFORMIN (GLUCOPHAGE-XR) 500 MG extended release tablet TAKE 2 TABLETS BY MOUTH TWICE A DAY WITH MEALS 120 tablet 5    ergocalciferol (DRISDOL) 1.25 MG (70836 UT) capsule Take 1 capsule by mouth Twice a Week 26 capsule 1    Tirzepatide (MOUNJARO) 2.5 MG/0.5ML SOPN SC injection Lot # [id number] Ex 10/4/2023 1 Adjustable Dose Pre-filled Pen Syringe 0    fluticasone (FLONASE) 50 MCG/ACT nasal spray SPRAY 1 SPRAY INTO EACH NOSTRIL EVERY DAY 16 g 5    fexofenadine-pseudoephedrine (ALLEGRA-D 24HR) 180-240 MG per extended release tablet Take 1 tablet by mouth as needed (allergies and sinus pressure.) 20 tablet 1    pioglitazone (ACTOS) 15 MG tablet TAKE 1 TABLET BY MOUTH EVERY DAY 30 tablet 5    lactulose (CHRONULAC) 10 GM/15ML solution TAKE 8-15 MLS BY MOUTH NIGHTLY AS NEEDED (CONSITPATION) 473 mL 2    omeprazole (PRILOSEC) 40 MG delayed release capsule TAKE 1 CAPSULE BY MOUTH EVERY DAY 30 capsule 3    docusate sodium (COLACE) 100 MG capsule Take 1 capsule by mouth 2 times daily as needed for Constipation 50 capsule 1    Psyllium (METAMUCIL FIBER PO) Take by mouth as needed       spironolactone (ALDACTONE) 50 MG tablet TAKE 1 TABLET BY MOUTH TWICE A  tablet 3    ASPIRIN LOW DOSE 81 MG EC tablet TAKE 1 TABLET BY MOUTH EVERY DAY 30 tablet 11    B-D ULTRAFINE III SHORT PEN 31G X 8 MM MISC USE AS DIRECTED TO INJECT INSULIN EVERY DAY  3       OBJECTIVE:  Vitals:    10/21/22 1541   BP: 112/60   Pulse: 92   Temp: 97.6 °F (36.4 °C)   TempSrc: Temporal   SpO2: 98%   Weight: 211 lb 9.6 oz (96 kg)   Height: 5' 7\" (1.702 m)     Physical Exam  Constitutional:       Appearance: Normal appearance. Cardiovascular:      Rate and Rhythm: Normal rate and regular rhythm. Pulmonary:      Effort: Pulmonary effort is normal.      Breath sounds: Normal breath sounds. Musculoskeletal:      Lumbar back: No bony tenderness. Positive right straight leg raise test (equivocal positive) and positive left straight leg raise test (equivocal positive). Right lower leg: No edema. Left lower leg: No edema. Neurological:      Motor: Weakness (Right ankle dorsiflexion 4-5, rest of lower extremity strength normal.) present. Gait: Gait normal.      Deep Tendon Reflexes: Reflexes abnormal (unable to elicit patellar DTR). This note was generated completely or in part utilizing Dragon dictation speech recognition software. Occasionally, words are mistranscribed and despite editing, the text may contain inaccuracies due to incorrect word recognition.   If further clarification is needed please contact the office at [phone]  --Brandon Tavares MD

## 2022-10-22 DIAGNOSIS — D50.9 IRON DEFICIENCY ANEMIA, UNSPECIFIED IRON DEFICIENCY ANEMIA TYPE: Primary | ICD-10-CM

## 2022-10-22 NOTE — ASSESSMENT & PLAN NOTE
Asymptomatic. No menorrhagia in approximately 10 months. Not taking iron due to constipation. Recheck CBC and iron studies today to assure complete resolution.

## 2022-10-22 NOTE — ASSESSMENT & PLAN NOTE
Neurogenic claudication. Recent vascular studies normal.  Also with some weakness of right ankle dorsiflexion. MRI lumbar spine for further evaluation.

## 2022-10-22 NOTE — ASSESSMENT & PLAN NOTE
Managed by endocrine. Recent A1c elevated, had been out of Ozempic x2 months.   Med changed to [employer] per endocrinologist.

## 2022-10-24 DIAGNOSIS — D50.9 IRON DEFICIENCY ANEMIA, UNSPECIFIED IRON DEFICIENCY ANEMIA TYPE: ICD-10-CM

## 2022-10-24 LAB — IGA: 307 MG/DL (ref 70–400)

## 2022-10-26 ENCOUNTER — TELEPHONE (OUTPATIENT)
Dept: INTERNAL MEDICINE CLINIC | Age: 57
End: 2022-10-26

## 2022-10-26 LAB — TISSUE TRANSGLUTAMINASE IGA: <0.5 U/ML (ref 0–14)

## 2022-10-26 RX ORDER — FLUCONAZOLE 150 MG/1
150 TABLET ORAL ONCE
Qty: 2 TABLET | Refills: 1 | Status: SHIPPED | OUTPATIENT
Start: 2022-10-26 | End: 2022-10-26

## 2022-10-26 NOTE — TELEPHONE ENCOUNTER
Patient was called and advised of lab results. Patient requesting something be sent in for a yeast infection that she has. States Jardiance causes her to get those. Advised patient there is an e-visit she can submit for that. Patient states  has sent just sent it in for her before. Patient has discharge & itching.  Denies odor

## 2022-10-26 NOTE — TELEPHONE ENCOUNTER
Patient advised. States she only gets them a couple of times a year. She will not change Jardiance.  Says it is the only thing that works for her

## 2022-10-26 NOTE — TELEPHONE ENCOUNTER
Please let her know that I did send in [facility] for her. Depending on the frequency of the yeast infections, we may need to have her talk to endocrinologist about changing from [facility].

## 2022-10-28 ENCOUNTER — HOSPITAL ENCOUNTER (OUTPATIENT)
Dept: MRI IMAGING | Age: 57
Discharge: HOME | End: 2022-10-28
Payer: COMMERCIAL

## 2022-10-28 DIAGNOSIS — R29.898 RIGHT LEG WEAKNESS: ICD-10-CM

## 2022-10-28 DIAGNOSIS — M54.41 CHRONIC MIDLINE LOW BACK PAIN WITH BILATERAL SCIATICA: ICD-10-CM

## 2022-10-28 DIAGNOSIS — G89.29 CHRONIC MIDLINE LOW BACK PAIN WITH BILATERAL SCIATICA: ICD-10-CM

## 2022-10-28 DIAGNOSIS — M54.42 CHRONIC MIDLINE LOW BACK PAIN WITH BILATERAL SCIATICA: ICD-10-CM

## 2022-10-28 PROCEDURE — 72148 MRI LUMBAR SPINE W/O DYE: CPT

## 2022-10-30 ENCOUNTER — TELEPHONE (OUTPATIENT)
Dept: INTERNAL MEDICINE CLINIC | Age: 57
End: 2022-10-30

## 2022-10-30 DIAGNOSIS — M48.062 SPINAL STENOSIS OF LUMBAR REGION WITH NEUROGENIC CLAUDICATION: ICD-10-CM

## 2022-10-30 DIAGNOSIS — R29.898 RIGHT LEG WEAKNESS: Primary | ICD-10-CM

## 2022-11-07 NOTE — TELEPHONE ENCOUNTER
Spoke with patient. Severe spinal stenosis. Having right leg weakness after walking for any distance. Referring to PT and Dr. Doreen Cooley at [facility].

## 2022-11-15 DIAGNOSIS — E11.3299 TYPE 2 DIABETES MELLITUS WITH BACKGROUND RETINOPATHY (HCC): Primary | ICD-10-CM

## 2022-11-15 RX ORDER — TIRZEPATIDE 5 MG/.5ML
5 INJECTION, SOLUTION SUBCUTANEOUS WEEKLY
Qty: 2 ML | Refills: 3 | Status: SHIPPED | OUTPATIENT
Start: 2022-11-15

## 2022-11-18 DIAGNOSIS — E11.21 TYPE 2 DIABETES MELLITUS WITH DIABETIC NEPHROPATHY, WITH LONG-TERM CURRENT USE OF INSULIN (HCC): ICD-10-CM

## 2022-11-18 DIAGNOSIS — E11.3299 TYPE 2 DIABETES MELLITUS WITH BACKGROUND RETINOPATHY (HCC): ICD-10-CM

## 2022-11-18 DIAGNOSIS — E11.9 TYPE 2 DIABETES MELLITUS WITHOUT COMPLICATION, WITH LONG-TERM CURRENT USE OF INSULIN (HCC): ICD-10-CM

## 2022-11-18 DIAGNOSIS — Z79.4 TYPE 2 DIABETES MELLITUS WITHOUT COMPLICATION, WITH LONG-TERM CURRENT USE OF INSULIN (HCC): ICD-10-CM

## 2022-11-18 DIAGNOSIS — Z79.4 TYPE 2 DIABETES MELLITUS WITH DIABETIC NEPHROPATHY, WITH LONG-TERM CURRENT USE OF INSULIN (HCC): ICD-10-CM

## 2022-11-18 RX ORDER — PIOGLITAZONEHYDROCHLORIDE 15 MG/1
TABLET ORAL
Qty: 30 TABLET | Refills: 5 | Status: SHIPPED | OUTPATIENT
Start: 2022-11-18

## 2022-11-18 NOTE — TELEPHONE ENCOUNTER
Requested Refill:   Requested Prescriptions     Pending Prescriptions Disp Refills    pioglitazone (ACTOS) 15 MG tablet [Pharmacy Med Name: PIOGLITAZONE HCL 15 MG TABLET] 30 tablet 5     Sig: TAKE 1 TABLET BY MOUTH EVERY DAY     Last refilled: 1/6/2022 # 30 with 5 refills     Last Appt: 10/3/2022  Next Appt: 1/9/2023

## 2022-12-13 NOTE — TELEPHONE ENCOUNTER
Tell patient to start an e-visit for this with me using [employer]. If she does not have my chart, I can do a telephone visit with her today. ,

## 2022-12-24 DIAGNOSIS — L68.0 HIRSUTISM: ICD-10-CM

## 2022-12-27 RX ORDER — SPIRONOLACTONE 50 MG/1
TABLET, FILM COATED ORAL
Qty: 180 TABLET | Refills: 3 | Status: SHIPPED | OUTPATIENT
Start: 2022-12-27

## 2022-12-27 NOTE — TELEPHONE ENCOUNTER
Requested Refill:   Requested Prescriptions     Pending Prescriptions Disp Refills    spironolactone (ALDACTONE) 50 MG tablet [Pharmacy Med Name: SPIRONOLACTONE 50 MG TABLET] 180 tablet 3     Sig: TAKE 1 TABLET BY MOUTH TWICE A DAY     Last refilled: 8/30/2021 # 180 with 3 refills     Last Appt: 10/3/2022  Next Appt: 1/9/2023

## 2023-02-13 ENCOUNTER — OFFICE VISIT (OUTPATIENT)
Dept: ENDOCRINOLOGY | Age: 58
End: 2023-02-13
Payer: COMMERCIAL

## 2023-02-13 VITALS
HEIGHT: 67 IN | DIASTOLIC BLOOD PRESSURE: 64 MMHG | BODY MASS INDEX: 32.69 KG/M2 | SYSTOLIC BLOOD PRESSURE: 124 MMHG | WEIGHT: 208.3 LBS | HEART RATE: 98 BPM | OXYGEN SATURATION: 99 %

## 2023-02-13 DIAGNOSIS — E55.9 VITAMIN D DEFICIENCY: ICD-10-CM

## 2023-02-13 DIAGNOSIS — E11.3299 TYPE 2 DIABETES MELLITUS WITH BACKGROUND RETINOPATHY (HCC): Primary | ICD-10-CM

## 2023-02-13 DIAGNOSIS — I10 ESSENTIAL HYPERTENSION: ICD-10-CM

## 2023-02-13 DIAGNOSIS — Z79.4 TYPE 2 DIABETES MELLITUS WITH DIABETIC NEPHROPATHY, WITH LONG-TERM CURRENT USE OF INSULIN (HCC): ICD-10-CM

## 2023-02-13 DIAGNOSIS — R11.2 DRUG-INDUCED NAUSEA AND VOMITING: ICD-10-CM

## 2023-02-13 DIAGNOSIS — T50.905A DRUG-INDUCED NAUSEA AND VOMITING: ICD-10-CM

## 2023-02-13 DIAGNOSIS — E11.21 TYPE 2 DIABETES MELLITUS WITH DIABETIC NEPHROPATHY, WITH LONG-TERM CURRENT USE OF INSULIN (HCC): ICD-10-CM

## 2023-02-13 PROCEDURE — 1036F TOBACCO NON-USER: CPT | Performed by: INTERNAL MEDICINE

## 2023-02-13 PROCEDURE — 3078F DIAST BP <80 MM HG: CPT | Performed by: INTERNAL MEDICINE

## 2023-02-13 PROCEDURE — 3074F SYST BP LT 130 MM HG: CPT | Performed by: INTERNAL MEDICINE

## 2023-02-13 PROCEDURE — G8417 CALC BMI ABV UP PARAM F/U: HCPCS | Performed by: INTERNAL MEDICINE

## 2023-02-13 PROCEDURE — 99214 OFFICE O/P EST MOD 30 MIN: CPT | Performed by: INTERNAL MEDICINE

## 2023-02-13 PROCEDURE — 2022F DILAT RTA XM EVC RTNOPTHY: CPT | Performed by: INTERNAL MEDICINE

## 2023-02-13 PROCEDURE — 3017F COLORECTAL CA SCREEN DOC REV: CPT | Performed by: INTERNAL MEDICINE

## 2023-02-13 PROCEDURE — G8484 FLU IMMUNIZE NO ADMIN: HCPCS | Performed by: INTERNAL MEDICINE

## 2023-02-13 PROCEDURE — G9899 SCRN MAM PERF RSLTS DOC: HCPCS | Performed by: INTERNAL MEDICINE

## 2023-02-13 PROCEDURE — G8427 DOCREV CUR MEDS BY ELIG CLIN: HCPCS | Performed by: INTERNAL MEDICINE

## 2023-02-13 PROCEDURE — 3046F HEMOGLOBIN A1C LEVEL >9.0%: CPT | Performed by: INTERNAL MEDICINE

## 2023-02-13 RX ORDER — ONDANSETRON 4 MG/1
4 TABLET, FILM COATED ORAL DAILY PRN
Qty: 30 TABLET | Refills: 1 | Status: SHIPPED | OUTPATIENT
Start: 2023-02-13

## 2023-02-13 RX ORDER — BLOOD SUGAR DIAGNOSTIC
3 STRIP MISCELLANEOUS DAILY
Qty: 300 EACH | Refills: 3 | Status: SHIPPED | OUTPATIENT
Start: 2023-02-13

## 2023-02-13 NOTE — PROGRESS NOTES
Patient ID:   Jason iPerce is a 65 y.o. female    Chief Complaint:   Jason Pierce presents for an evaluation of Type 2 Diabetes Mellitus , Hyperlipidemia and hypertension. Subjective:   Type 2 Diabetes Mellitus diagnosed at age 30-32. On insulin since Nov 2017. Invokana caused UTI in past.     She stopped [place] for cramping. She did not like Basalar or Lantus. She did not like Trulicity as much as Ozempic     Metformin ER 500mg, two tabs twice daily       Actos 15 mg daily   Jardiance 25mg daily. She takes half in am and half at dinner. Multiple UTI's  Mounjaro 5 mg weekly on Sundays. It is causing some stomach pain, zempic 2 mg weekly on Saturdays. Out of refills for last 2 months      Weight is going down        Checks blood sugars 0-1 times per day. 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 Occasional snacks (chips, pretzels). Diet pepsi. Exercise: Walk around the work during lunch break. Denies chest pain, exertional dyspnea. Family history of CAD: strokes in multiple family members (aunts and uncles)    Denies smoking/alcohol.    Currently on ASA 81 mg daily     The following portions of the patient's history were reviewed and updated as appropriate:       Family History   Problem Relation Age of Onset    Diabetes Mother     High Blood Pressure Mother     High Cholesterol Mother     Uterine Cancer Mother     High Blood Pressure Father     Other Father         cardiac amyloidosis    Heart Disease Sister     Diabetes Maternal Aunt     Stroke Maternal Aunt     Heart Disease Maternal Uncle     Cancer Maternal Uncle     Cancer Maternal Grandmother     Diabetes Maternal Grandfather     Diabetes Maternal Aunt     Stroke Maternal Aunt     Diabetes Maternal Aunt     Stroke Maternal Aunt     Heart Disease Maternal Aunt     No Known Problems Brother     No Known Problems Paternal Aunt No Known Problems Paternal Uncle     No Known Problems Paternal Grandmother     No Known Problems Paternal Grandfather     No Known Problems Other     Rheum Arthritis Neg Hx     Osteoarthritis Neg Hx     Asthma Neg Hx     Breast Cancer Neg Hx     Heart Failure Neg Hx     Hypertension Neg Hx     Migraines Neg Hx     Ovarian Cancer Neg Hx     Rashes/Skin Problems Neg Hx     Seizures Neg Hx     Thyroid Disease Neg Hx          Social History     Socioeconomic History    Marital status: Single     Spouse name: Not on file    Number of children: Not on file    Years of education: Not on file    Highest education level: Not on file   Occupational History    Occupation:  for [employer]   Tobacco Use    Smoking status: Former     Packs/day: 0.25     Years: 10.00     Pack years: 2.50     Types: Cigarettes     Quit date: 2010     Years since quittin.1    Smokeless tobacco: Former     Quit date: 2014    Tobacco comments:     quit 2 years ago   Vaping Use    Vaping Use: Never used   Substance and Sexual Activity    Alcohol use:  Yes     Alcohol/week: 0.0 standard drinks     Comment: socially    Drug use: No    Sexual activity: Not Currently     Partners: Male   Other Topics Concern    Not on file   Social History Narrative    Not on file     Social Determinants of Health     Financial Resource Strain: Low Risk     Difficulty of Paying Living Expenses: Not hard at all   Food Insecurity: No Food Insecurity    Worried About Running Out of Food in the Last Year: Never true    Ran Out of Food in the Last Year: Never true   Transportation Needs: Not on file   Physical Activity: Not on file   Stress: Not on file   Social Connections: Not on file   Intimate Partner Violence: Not on file   Housing Stability: Not on file       Past Medical History:   Diagnosis Date    Arthritis of left acromioclavicular joint 2017    Cataract     Complete tear of left rotator cuff 2017    COVID Essential hypertension 4/16/2018    Fibroid     Fibroids     uterine    Iron deficiency anemia 11/22/2020    Normal colonoscopy, mennorhagia    Iron deficiency anemia 11/22/2020    Rupture of biceps tendon 6/19/2017    Type II or unspecified type diabetes mellitus without mention of complication, not stated as uncontrolled     Urogenital trichomoniasis        Past Surgical History:   Procedure Laterality Date    CATARACT REMOVAL WITH IMPLANT      COLONOSCOPY  5/23/16    incomplete, Dr Bonilla Splinter EXTRACTION         No Known Allergies      Current Outpatient Medications:     ondansetron (ZOFRAN) 4 MG tablet, Take 1 tablet by mouth daily as needed for Nausea or Vomiting, Disp: 30 tablet, Rfl: 1    spironolactone (ALDACTONE) 50 MG tablet, TAKE 1 TABLET BY MOUTH TWICE A DAY, Disp: 180 tablet, Rfl: 3    pioglitazone (ACTOS) 15 MG tablet, TAKE 1 TABLET BY MOUTH EVERY DAY, Disp: 30 tablet, Rfl: 5    Tirzepatide (MOUNJARO) 5 MG/0.5ML SOPN SC injection, Inject 0.5 mLs into the skin once a week, Disp: 2 mL, Rfl: 3    hydroCHLOROthiazide (HYDRODIURIL) 25 MG tablet, TAKE 1 TABLET BY MOUTH EVERY DAY, Disp: 30 tablet, Rfl: 5    empagliflozin (JARDIANCE) 25 MG tablet, One tab daily, Disp: 30 tablet, Rfl: 5    metFORMIN (GLUCOPHAGE-XR) 500 MG extended release tablet, TAKE 2 TABLETS BY MOUTH TWICE A DAY WITH MEALS, Disp: 120 tablet, Rfl: 5    fluticasone (FLONASE) 50 MCG/ACT nasal spray, SPRAY 1 SPRAY INTO EACH NOSTRIL EVERY DAY, Disp: 16 g, Rfl: 5    fexofenadine-pseudoephedrine (ALLEGRA-D 24HR) 180-240 MG per extended release tablet, Take 1 tablet by mouth as needed (allergies and sinus pressure.), Disp: 20 tablet, Rfl: 1    omeprazole (PRILOSEC) 40 MG delayed release capsule, TAKE 1 CAPSULE BY MOUTH EVERY DAY, Disp: 30 capsule, Rfl: 3    docusate sodium (COLACE) 100 MG capsule, Take 1 capsule by mouth 2 times daily as needed for Constipation, Disp: 50 capsule, Rfl: 1    ASPIRIN LOW DOSE 81 MG EC tablet, TAKE 1 TABLET BY MOUTH EVERY DAY, Disp: 30 tablet, Rfl: 11    B-D ULTRAFINE III SHORT PEN 31G X 8 MM MISC, USE AS DIRECTED TO INJECT INSULIN EVERY DAY, Disp: , Rfl: 3    ergocalciferol (DRISDOL) 1.25 MG (40552 UT) capsule, Take 1 capsule by mouth Twice a Week, Disp: 26 capsule, Rfl: 1      Review of Systems:    Constitutional: Negative for fever, chills, and unexpected weight change. HENT: Negative for congestion, ear pain, rhinorrhea,  sore throat and trouble swallowing. Eyes: Negative for photophobia, redness, itching. Respiratory: Negative for cough, shortness of breath and sputum. Cardiovascular: Negative for chest pain, palpitations and leg swelling. Gastrointestinal: Negative for nausea, vomiting, abdominal pain, diarrhea, constipation. Endocrine: Negative for cold intolerance, heat intolerance, polydipsia, polyphagia and polyuria. Genitourinary: Negative for dysuria, urgency, frequency, hematuria and flank pain. Musculoskeletal: Negative for myalgias, back pain, arthralgias and neck pain. Skin/Nail: Negative for rash, itching. Normal nails. Neurological: Negative for seizures, weakness, light-headedness, numbness and headaches. Hematological/ Lymph nodes: Negative for adenopathy. Does not bruise/bleed easily. Psychiatric/Behavioral: Negative for suicidal ideas, depression, anxiety, sleep disturbance and decreased concentration. Objective:   Physical Exam:  /64 (Site: Left Upper Arm, Position: Sitting, Cuff Size: Large Adult)   Pulse 98   Ht 5' 7\" (1.702 m)   Wt 208 lb 4.8 oz (94.5 kg)   LMP  (LMP Unknown)   SpO2 99%   BMI 32.62 kg/m²     Constitutional: Patient is oriented to person, place, and time. Patient appears well-developed and well-nourished. Pulmonary/Chest: Effort normal.   Neurological: Patient is alert and oriented to person, place, and time. Psychiatric: Patient has a normal mood and affect.  Patient behavior is normal.     Lab Review:    Orders Only on 10/24/2022 Component Date Value Ref Range Status    IgA 10/21/2022 307.0  70.0 - 400.0 mg/dL Final    Tissue Transglutaminase IgA 10/21/2022 <0.5  0.0 - 14.0 U/mL Final   Orders Only on 10/21/2022   Component Date Value Ref Range Status    Ferritin 10/21/2022 20.2  15.0 - 150.0 ng/mL Final    Iron 10/21/2022 38  37 - 145 ug/dL Final    TIBC 10/21/2022 411  260 - 445 ug/dL Final    Iron Saturation 10/21/2022 9 (A)  15 - 50 % Final    WBC 10/21/2022 6.3  4.0 - 11.0 K/uL Final    RBC 10/21/2022 4.17  4.00 - 5.20 M/uL Final    Hemoglobin 10/21/2022 11.0 (A)  12.0 - 16.0 g/dL Final    Hematocrit 10/21/2022 32.3 (A)  36.0 - 48.0 % Final    MCV 10/21/2022 77.6 (A)  80.0 - 100.0 fL Final    MCH 10/21/2022 26.3  26.0 - 34.0 pg Final    MCHC 10/21/2022 33.9  31.0 - 36.0 g/dL Final    RDW 10/21/2022 15.1  12.4 - 15.4 % Final    Platelets 71/42/1415 356  135 - 450 K/uL Final    MPV 10/21/2022 9.3  5.0 - 10.5 fL Final    Vit D, 25-Hydroxy 10/21/2022 38.5  >=30 ng/mL Final    Hemoglobin A1C 10/21/2022 11.4  See comment % Final    eAG 10/21/2022 280.5  mg/dL Final    Hep C Ab Interp 10/21/2022 Non-reactive  Non-reactive Final   Orders Only on 09/30/2022   Component Date Value Ref Range Status    TSH Reflex FT4 09/30/2022 1.12  0.27 - 4.20 uIU/mL Final    Vit D, 25-Hydroxy 09/30/2022 29.9 (A)  >=30 ng/mL Final    Microalbumin, Random Urine 09/30/2022 <1.20  <2.0 mg/dL Final    Creatinine, Ur 09/30/2022 37.3  28.0 - 259.0 mg/dL Final    Microalbumin Creatinine Ratio 09/30/2022 see below  0.0 - 30.0 mg/g Final    Sodium 09/30/2022 138  136 - 145 mmol/L Final    Potassium 09/30/2022 4.7  3.5 - 5.1 mmol/L Final    Chloride 09/30/2022 100  99 - 110 mmol/L Final    CO2 09/30/2022 22  21 - 32 mmol/L Final    Anion Gap 09/30/2022 16  3 - 16 Final    Glucose 09/30/2022 296 (A)  70 - 99 mg/dL Final    BUN 09/30/2022 27 (A)  7 - 20 mg/dL Final    Creatinine 09/30/2022 0.9  0.6 - 1.1 mg/dL Final    GFR Non- 09/30/2022 >60  >60 Final GFR  09/30/2022 >60  >60 Final    Calcium 09/30/2022 9.5  8.3 - 10.6 mg/dL Final    Total Protein 09/30/2022 6.8  6.4 - 8.2 g/dL Final    Albumin 09/30/2022 4.1  3.4 - 5.0 g/dL Final    Albumin/Globulin Ratio 09/30/2022 1.5  1.1 - 2.2 Final    Total Bilirubin 09/30/2022 <0.2  0.0 - 1.0 mg/dL Final    Alkaline Phosphatase 09/30/2022 190 (A)  40 - 129 U/L Final    ALT 09/30/2022 22  10 - 40 U/L Final    AST 09/30/2022 17  15 - 37 U/L Final    Cholesterol, Fasting 09/30/2022 187  0 - 199 mg/dL Final    Triglyceride, Fasting 09/30/2022 59  0 - 150 mg/dL Final    HDL 09/30/2022 68 (A)  40 - 60 mg/dL Final    LDL Calculated 09/30/2022 107 (A)  <100 mg/dL Final    VLDL Cholesterol Calculated 09/30/2022 12  Not Established mg/dL Final    Hemoglobin A1C 09/30/2022 10.7  See comment % Final    eAG 09/30/2022 260.4  mg/dL Final   Orders Only on 06/29/2022   Component Date Value Ref Range Status    Visual Acuity Distance Right Eye 06/29/2022 20/25   Final    Visual Acuity Distance Left Eye 06/29/2022 20/25   Final    Intraocular Pressure Eye 06/29/2022    Final                    Value:13  14      Diabetic Retinopathy 06/29/2022 POSITIVE - MONITOR   Final    Cataracts 06/29/2022 POSITIVE   Final    Glaucoma 06/29/2022 NEGATIVE   Final   Office Visit on 03/11/2022   Component Date Value Ref Range Status    Organism 03/11/2022 Citrobacter koseri (A)   Final    Urine Culture, Routine 03/11/2022 >100,000 CFU/ml   Final           Assessment and Plan     Tosin was seen today for follow-up and diabetes. Diagnoses and all orders for this visit:    Type 2 diabetes mellitus with background retinopathy ([address]  -     Hemoglobin A1C; Future  -     Hemoglobin A1C; Future    Type 2 diabetes mellitus with diabetic nephropathy, with long-term current use of insulin (HCC)  -     Hemoglobin A1C; Future    Essential hypertension    Vitamin D deficiency  -     Vitamin D 25 Hydroxy;  Future    Drug-induced nausea and vomiting  - ondansetron (ZOFRAN) 4 MG tablet; Take 1 tablet by mouth daily as needed for Nausea or Vomiting         1: Type 2 DM complicated with nephropathy, retinopathy   Uncontrolled A1C 10.7% < 7.1% < 8.4% <  9% <  8.1% < 9.4% < 8.9% < 8.8% < 8.9 % < 8.7% < 9.7%     A1C is unreliable with iron deficiency anemia   Sister and mother also have anemia     Fructosamine 383 eq to A1C of 9.6%. consistent with A1C     Hb electrophoresis normal May 2021     Blood sugars have improved     She needs to check more frequent blood sugars, 3 x per day     C/w   Metformin ER 500mg two tabs twice a day   Actos 15 mg daily   Jardiance 25mg daily. Change to half tab daily if sugars under control . May stop it next time to avoid UTI  Keep Mounjaro 5 mg weekly. Take zofran 4 mg prn as needed   Giving samples of Mounjaro 2.5 mg   If she calls me I will send higher dose of 5 mg weekly     She is still getting yeast infection. Recommend stopping it. For UTI see PCP     If blood sugars are still high start DAVIS     All instructions provided in written. Check Blood sugars 3 times per day. Log them along with insulin and send them every 2 weeks. Call for blood sugars less than 60 or more than 400. Eye exam: Last exam in June 2022. DR bradshaw, stable. Follow up in 6 months. Foot exam:  Oct 2022. dystrophic nail with fungal infection of toes, extending to skin on left foot   Deformity/amputation: absent  Skin lesions/pre-ulcerative calluses: absent  Edema: right- negative, left- negative  Sensory exam: Monofilament sensation: normal  Pulses: normal, Vibration (128 Hz): mildly impaired     Renal screen: Normal  Sep 2022      TSH screen: Sep 2022      2: HTN   Controlled      3: Hyperlipidemia   LDL: 107, HDL: 68, TGs: 59 - Sep 2022      She stopped statins due to muscle cramps. She could not not tolerate Crestor 10mg, every other day.    Recheck later and if Vit D > 50 start once weekly crestor     Vit D Def: 38.5 - Oct 2022     C/w ergocalciferol 50k units twice weekly     4: Hirsutism, since 20's   On face only   Runs in the family   Gonadotropins suggest pre menopause status   Free T high 5.8 (N 0.6-3.8), TT normal 24 July 2018   Free T 4.0 high June 2020. Spironolactone 50mg twice daily, this dose is helping on facial hair. She thinks she it has helped. 5: Morbid obesity   Calorie target <1300 per day   Need to work on diet, exercise and lose weight   Lifestyle changes failed for meaningful weight loss   Belviq 10mg bid , was stared in May 2019 - stopped due to recall     A1C today     RTC in 3 months with A1C, Vit D     Patient is aware that refills are on the visit     EDUCATION:   Greater than 50% of this follow-up visit was spent in general counseling regarding   obesity, diet, exercise, importance of adherence to insulin regime, recognition and treatment of hypo and hyperglycemia,  glucose logging, proper diabetes management, diabetic complications with poor management and the importance of glycemic control in order to avoid the complications of diabetes. Risks and potential complications of diabetes were reviewed with the patient. Diabetes health maintenance plan and follow-up were discussed and understood by the patient. We reviewed the importance of medication compliance and regular follow-up. Aggressive lifestyle modification was encouraged. Exercise Counselling: This patient is a candidate for regular physical exercise. Instructions to perform the following types of exercise:  Swimming or water aerobic exercise  Brisk walking  Playing tennis  Stationary bicycle or elliptical indoor  Low impact aerobic exercise    Instructions given to exercise for the following duration:  30 minutes a day for five-seven days per week.     Following instructions for being active throughout the day in addition to formal exercise:  Walk instead of drive whenever possible  Take the stairs instead of the elevator  Work in the garden  Park to the far end of the parking lot to add more walking steps to destination      Electronically signed by Betty Gunderson MD on 2/13/2023 at 3:13 PM

## 2023-03-27 NOTE — TELEPHONE ENCOUNTER
Call from patient stating that Rx Zofran that Dr. Belen Espinoza prescribed is not helping alleviate the nausea and discomfort from taking Mounjaro     Pt is wanting to know if there is another alternative that he can prescribe?     Please advise   CB# [phone]

## 2023-03-28 RX ORDER — TIRZEPATIDE 2.5 MG/.5ML
2.5 INJECTION, SOLUTION SUBCUTANEOUS WEEKLY
Qty: 2 ML | Refills: 2 | Status: SHIPPED | OUTPATIENT
Start: 2023-03-28

## 2023-04-21 ENCOUNTER — OFFICE VISIT (OUTPATIENT)
Dept: INTERNAL MEDICINE CLINIC | Age: 58
End: 2023-04-21
Payer: COMMERCIAL

## 2023-04-21 VITALS
DIASTOLIC BLOOD PRESSURE: 62 MMHG | OXYGEN SATURATION: 98 % | WEIGHT: 210 LBS | SYSTOLIC BLOOD PRESSURE: 123 MMHG | HEART RATE: 93 BPM | BODY MASS INDEX: 32.89 KG/M2 | TEMPERATURE: 97.4 F

## 2023-04-21 DIAGNOSIS — R10.84 GENERALIZED ABDOMINAL PAIN: Primary | ICD-10-CM

## 2023-04-21 DIAGNOSIS — R74.8 ALKALINE PHOSPHATASE ELEVATION: ICD-10-CM

## 2023-04-21 DIAGNOSIS — D50.9 IRON DEFICIENCY ANEMIA, UNSPECIFIED IRON DEFICIENCY ANEMIA TYPE: ICD-10-CM

## 2023-04-21 DIAGNOSIS — Z79.4 TYPE 2 DIABETES MELLITUS WITH DIABETIC NEPHROPATHY, WITH LONG-TERM CURRENT USE OF INSULIN (HCC): ICD-10-CM

## 2023-04-21 DIAGNOSIS — E11.21 TYPE 2 DIABETES MELLITUS WITH DIABETIC NEPHROPATHY, WITH LONG-TERM CURRENT USE OF INSULIN (HCC): ICD-10-CM

## 2023-04-21 DIAGNOSIS — C50.919 INVASIVE LOBULAR CARCINOMA OF BREAST IN FEMALE (HCC): ICD-10-CM

## 2023-04-21 DIAGNOSIS — K59.09 CHRONIC CONSTIPATION: ICD-10-CM

## 2023-04-21 PROCEDURE — G8417 CALC BMI ABV UP PARAM F/U: HCPCS | Performed by: INTERNAL MEDICINE

## 2023-04-21 PROCEDURE — 1036F TOBACCO NON-USER: CPT | Performed by: INTERNAL MEDICINE

## 2023-04-21 PROCEDURE — 99214 OFFICE O/P EST MOD 30 MIN: CPT | Performed by: INTERNAL MEDICINE

## 2023-04-21 PROCEDURE — 3017F COLORECTAL CA SCREEN DOC REV: CPT | Performed by: INTERNAL MEDICINE

## 2023-04-21 PROCEDURE — G9899 SCRN MAM PERF RSLTS DOC: HCPCS | Performed by: INTERNAL MEDICINE

## 2023-04-21 PROCEDURE — G8427 DOCREV CUR MEDS BY ELIG CLIN: HCPCS | Performed by: INTERNAL MEDICINE

## 2023-04-21 PROCEDURE — 3074F SYST BP LT 130 MM HG: CPT | Performed by: INTERNAL MEDICINE

## 2023-04-21 PROCEDURE — 3051F HG A1C>EQUAL 7.0%<8.0%: CPT | Performed by: INTERNAL MEDICINE

## 2023-04-21 PROCEDURE — 2022F DILAT RTA XM EVC RTNOPTHY: CPT | Performed by: INTERNAL MEDICINE

## 2023-04-21 PROCEDURE — 3078F DIAST BP <80 MM HG: CPT | Performed by: INTERNAL MEDICINE

## 2023-04-21 RX ORDER — OMEPRAZOLE 40 MG/1
CAPSULE, DELAYED RELEASE ORAL
Qty: 30 CAPSULE | Refills: 5 | Status: SHIPPED | OUTPATIENT
Start: 2023-04-21

## 2023-04-21 SDOH — ECONOMIC STABILITY: INCOME INSECURITY: HOW HARD IS IT FOR YOU TO PAY FOR THE VERY BASICS LIKE FOOD, HOUSING, MEDICAL CARE, AND HEATING?: NOT HARD AT ALL

## 2023-04-21 SDOH — ECONOMIC STABILITY: FOOD INSECURITY: WITHIN THE PAST 12 MONTHS, YOU WORRIED THAT YOUR FOOD WOULD RUN OUT BEFORE YOU GOT MONEY TO BUY MORE.: NEVER TRUE

## 2023-04-21 SDOH — ECONOMIC STABILITY: HOUSING INSECURITY
IN THE LAST 12 MONTHS, WAS THERE A TIME WHEN YOU DID NOT HAVE A STEADY PLACE TO SLEEP OR SLEPT IN A SHELTER (INCLUDING NOW)?: NO

## 2023-04-21 SDOH — ECONOMIC STABILITY: FOOD INSECURITY: WITHIN THE PAST 12 MONTHS, THE FOOD YOU BOUGHT JUST DIDN'T LAST AND YOU DIDN'T HAVE MONEY TO GET MORE.: NEVER TRUE

## 2023-04-21 SDOH — ECONOMIC STABILITY: TRANSPORTATION INSECURITY
IN THE PAST 12 MONTHS, HAS LACK OF TRANSPORTATION KEPT YOU FROM MEETINGS, WORK, OR FROM GETTING THINGS NEEDED FOR DAILY LIVING?: NO

## 2023-04-21 ASSESSMENT — PATIENT HEALTH QUESTIONNAIRE - PHQ9
SUM OF ALL RESPONSES TO PHQ QUESTIONS 1-9: 0
SUM OF ALL RESPONSES TO PHQ9 QUESTIONS 1 & 2: 0
SUM OF ALL RESPONSES TO PHQ QUESTIONS 1-9: 0
SUM OF ALL RESPONSES TO PHQ QUESTIONS 1-9: 0
1. LITTLE INTEREST OR PLEASURE IN DOING THINGS: 0
2. FEELING DOWN, DEPRESSED OR HOPELESS: 0
SUM OF ALL RESPONSES TO PHQ QUESTIONS 1-9: 0

## 2023-04-21 NOTE — ASSESSMENT & PLAN NOTE
New problem, uncertain prognosis. Concern for pancreatitis. Constipation may also be contributing. Maybe lingering due to noé seltzer use 325 4x/day  Labs to be drawn in am including hepatic, lipase,  Cbc and bmp  Stop noé seltzer. Take omeprazole daily while having pain. Remain off of mounjaro.

## 2023-04-21 NOTE — ASSESSMENT & PLAN NOTE
Chronic problem, worse since took mounjaro. Probably contributing to the abdominal pain.    Continue glycerin suppositories, and add miralax over weekend

## 2023-04-21 NOTE — ASSESSMENT & PLAN NOTE
New dx, received today. Feels comfortable that she has questions answered, and will meet with breast surgeon next week.

## 2023-04-21 NOTE — PROGRESS NOTES
Emilie lobular breast  Tosin Pa   :  1965    ASSESSMENT/PLAN:   1. Generalized abdominal pain  Assessment & Plan:  New problem, uncertain prognosis. Concern for pancreatitis. Constipation may also be contributing. Maybe lingering due to noé seltzer use 325 4x/day  Labs to be drawn in am including hepatic, lipase,  Cbc and bmp  Stop noé seltzer. Take omeprazole daily while having pain. Remain off of mounjaro. Orders:  -     Lipase; Future  -     CBC with Auto Differential; Future  -     Basic Metabolic Panel; Future  -     Hepatic Function Panel; Future  2. Invasive lobular carcinoma of breast in female [facility])  Assessment & Plan:  New dx, received today. Feels comfortable that she has questions answered, and will meet with breast surgeon next week. 3. Chronic constipation  Assessment & Plan:  Chronic problem, worse since took mounjaro. Probably contributing to the abdominal pain. Continue glycerin suppositories, and add miralax over weekend   4. Type 2 diabetes mellitus with diabetic nephropathy, with long-term current use of insulin (HCC)  Assessment & Plan:  Chronic, well controlled on mounjaro but currently off medication due to abdominal pain. Managed by Dr. Herrera Rees. Call or return to clinic prn if these symptoms worsen or fail to improve as anticipated. Return in about 6 months (around 10/21/2023). SUBJECTIVE     63 y.o. female established patient here for:   Chief Complaint   Patient presents with    6 Month Follow-Up     Severe stomach pain for the last 2 months / diagnosed with breast cancer today. Just found out today that she has breast cancer following breast biopsy earlier this week. Doing ok. Will see breast surgeon next week. Told caught early. Sugars are doing great. Ozempic stopped working and was  with with mounjaro, but had to hold 3 weeks ago due to stomach symptoms. Has lowered the dose, but not started yet.    Constipated chronically and

## 2023-04-21 NOTE — ASSESSMENT & PLAN NOTE
Chronic, well controlled on mounjaro but currently off medication due to abdominal pain. Managed by Dr. Sandy Wayne.

## 2023-04-22 DIAGNOSIS — E11.3299 TYPE 2 DIABETES MELLITUS WITH BACKGROUND RETINOPATHY (HCC): ICD-10-CM

## 2023-04-22 DIAGNOSIS — R10.84 GENERALIZED ABDOMINAL PAIN: ICD-10-CM

## 2023-04-22 LAB
ALBUMIN SERPL-MCNC: 4.2 G/DL (ref 3.4–5)
ALP SERPL-CCNC: 160 U/L (ref 40–129)
ALT SERPL-CCNC: 10 U/L (ref 10–40)
ANION GAP SERPL CALCULATED.3IONS-SCNC: 13 MMOL/L (ref 3–16)
AST SERPL-CCNC: 10 U/L (ref 15–37)
BASOPHILS # BLD: 0.1 K/UL (ref 0–0.2)
BASOPHILS NFR BLD: 1.1 %
BILIRUB DIRECT SERPL-MCNC: <0.2 MG/DL (ref 0–0.3)
BILIRUB INDIRECT SERPL-MCNC: ABNORMAL MG/DL (ref 0–1)
BILIRUB SERPL-MCNC: <0.2 MG/DL (ref 0–1)
BUN SERPL-MCNC: 27 MG/DL (ref 7–20)
CALCIUM SERPL-MCNC: 10 MG/DL (ref 8.3–10.6)
CHLORIDE SERPL-SCNC: 97 MMOL/L (ref 99–110)
CO2 SERPL-SCNC: 26 MMOL/L (ref 21–32)
CREAT SERPL-MCNC: 0.9 MG/DL (ref 0.6–1.1)
DEPRECATED RDW RBC AUTO: 15.4 % (ref 12.4–15.4)
EOSINOPHIL # BLD: 0.4 K/UL (ref 0–0.6)
EOSINOPHIL NFR BLD: 5.9 %
GFR SERPLBLD CREATININE-BSD FMLA CKD-EPI: >60 ML/MIN/{1.73_M2}
GLUCOSE SERPL-MCNC: 176 MG/DL (ref 70–99)
HCT VFR BLD AUTO: 33.2 % (ref 36–48)
HGB BLD-MCNC: 10.9 G/DL (ref 12–16)
LIPASE SERPL-CCNC: 30 U/L (ref 13–60)
LYMPHOCYTES # BLD: 1.3 K/UL (ref 1–5.1)
LYMPHOCYTES NFR BLD: 18.5 %
MCH RBC QN AUTO: 26 PG (ref 26–34)
MCHC RBC AUTO-ENTMCNC: 32.8 G/DL (ref 31–36)
MCV RBC AUTO: 79.3 FL (ref 80–100)
MONOCYTES # BLD: 0.5 K/UL (ref 0–1.3)
MONOCYTES NFR BLD: 7.2 %
NEUTROPHILS # BLD: 4.8 K/UL (ref 1.7–7.7)
NEUTROPHILS NFR BLD: 67.3 %
PLATELET # BLD AUTO: 340 K/UL (ref 135–450)
PMV BLD AUTO: 8.9 FL (ref 5–10.5)
POTASSIUM SERPL-SCNC: 4.7 MMOL/L (ref 3.5–5.1)
PROT SERPL-MCNC: 7.3 G/DL (ref 6.4–8.2)
RBC # BLD AUTO: 4.18 M/UL (ref 4–5.2)
SODIUM SERPL-SCNC: 136 MMOL/L (ref 136–145)
WBC # BLD AUTO: 7.1 K/UL (ref 4–11)

## 2023-04-23 LAB
EST. AVERAGE GLUCOSE BLD GHB EST-MCNC: 168.6 MG/DL
HBA1C MFR BLD: 7.5 %

## 2023-04-24 LAB
FERRITIN SERPL IA-MCNC: 27.4 NG/ML (ref 15–150)
GGT SERPL-CCNC: 12 U/L (ref 5–36)
IRON SATN MFR SERPL: 9 % (ref 15–50)
IRON SERPL-MCNC: 35 UG/DL (ref 37–145)
TIBC SERPL-MCNC: 403 UG/DL (ref 260–445)

## 2023-04-30 PROBLEM — R10.84 GENERALIZED ABDOMINAL PAIN: Status: RESOLVED | Noted: 2023-04-21 | Resolved: 2023-04-30

## 2023-05-14 NOTE — TELEPHONE ENCOUNTER
Group Topic: BH Therapeutic Activity    Date: 5/13/2023  Start Time: 2015  End Time: 2045  Facilitators: Fran Barahona CNA    Focus: Meditation Exercise/    daily high/low  Number in attendance: 15    Method: Group  Attendance: Present  Participation: Active  Patient Response: Appropriate feedback  Mood: Normal  Affect: Type: Euthymic (normal mood)   Range: Full (normal)   Congruency: Congruent   Stability: Stable  Behavior/Socialization: Appropriate to group and Cooperative  Thought Process: Demonstrated insight  Task Performance: Follows directions  Patient Evaluation: Independent - full participation       lvm for pt to call back and schedule injection.

## 2023-05-22 ENCOUNTER — OFFICE VISIT (OUTPATIENT)
Dept: ENDOCRINOLOGY | Age: 58
End: 2023-05-22
Payer: COMMERCIAL

## 2023-05-22 VITALS
SYSTOLIC BLOOD PRESSURE: 110 MMHG | HEIGHT: 67 IN | DIASTOLIC BLOOD PRESSURE: 60 MMHG | HEART RATE: 97 BPM | WEIGHT: 210 LBS | OXYGEN SATURATION: 97 % | BODY MASS INDEX: 32.96 KG/M2

## 2023-05-22 DIAGNOSIS — Z79.4 TYPE 2 DIABETES MELLITUS WITH DIABETIC NEPHROPATHY, WITH LONG-TERM CURRENT USE OF INSULIN (HCC): Primary | ICD-10-CM

## 2023-05-22 DIAGNOSIS — I10 ESSENTIAL HYPERTENSION: ICD-10-CM

## 2023-05-22 DIAGNOSIS — E11.3299 TYPE 2 DIABETES MELLITUS WITH BACKGROUND RETINOPATHY (HCC): ICD-10-CM

## 2023-05-22 DIAGNOSIS — E55.9 VITAMIN D DEFICIENCY: ICD-10-CM

## 2023-05-22 DIAGNOSIS — E11.21 TYPE 2 DIABETES MELLITUS WITH DIABETIC NEPHROPATHY, WITH LONG-TERM CURRENT USE OF INSULIN (HCC): Primary | ICD-10-CM

## 2023-05-22 PROCEDURE — 1036F TOBACCO NON-USER: CPT | Performed by: INTERNAL MEDICINE

## 2023-05-22 PROCEDURE — 3074F SYST BP LT 130 MM HG: CPT | Performed by: INTERNAL MEDICINE

## 2023-05-22 PROCEDURE — 2022F DILAT RTA XM EVC RTNOPTHY: CPT | Performed by: INTERNAL MEDICINE

## 2023-05-22 PROCEDURE — 99214 OFFICE O/P EST MOD 30 MIN: CPT | Performed by: INTERNAL MEDICINE

## 2023-05-22 PROCEDURE — 3078F DIAST BP <80 MM HG: CPT | Performed by: INTERNAL MEDICINE

## 2023-05-22 PROCEDURE — 3017F COLORECTAL CA SCREEN DOC REV: CPT | Performed by: INTERNAL MEDICINE

## 2023-05-22 PROCEDURE — 3051F HG A1C>EQUAL 7.0%<8.0%: CPT | Performed by: INTERNAL MEDICINE

## 2023-05-22 PROCEDURE — G9899 SCRN MAM PERF RSLTS DOC: HCPCS | Performed by: INTERNAL MEDICINE

## 2023-05-22 PROCEDURE — G8417 CALC BMI ABV UP PARAM F/U: HCPCS | Performed by: INTERNAL MEDICINE

## 2023-05-22 PROCEDURE — G8427 DOCREV CUR MEDS BY ELIG CLIN: HCPCS | Performed by: INTERNAL MEDICINE

## 2023-05-22 NOTE — PROGRESS NOTES
Final    Anion Gap 04/22/2023 13  3 - 16 Final    Glucose 04/22/2023 176 (H)  70 - 99 mg/dL Final    BUN 04/22/2023 27 (H)  7 - 20 mg/dL Final    Creatinine 04/22/2023 0.9  0.6 - 1.1 mg/dL Final    Est, Glom Filt Rate 04/22/2023 >60  >60 Final    Calcium 04/22/2023 10.0  8.3 - 10.6 mg/dL Final    Total Protein 04/22/2023 7.3  6.4 - 8.2 g/dL Final    Albumin 04/22/2023 4.2  3.4 - 5.0 g/dL Final    Alkaline Phosphatase 04/22/2023 160 (H)  40 - 129 U/L Final    ALT 04/22/2023 10  10 - 40 U/L Final    AST 04/22/2023 10 (L)  15 - 37 U/L Final    Total Bilirubin 04/22/2023 <0.2  0.0 - 1.0 mg/dL Final    Bilirubin, Direct 04/22/2023 <0.2  0.0 - 0.3 mg/dL Final    Bilirubin, Indirect 04/22/2023 see below  0.0 - 1.0 mg/dL Final   Office Visit on 04/21/2023   Component Date Value Ref Range Status    Iron 04/22/2023 35 (L)  37 - 145 ug/dL Final    TIBC 04/22/2023 403  260 - 445 ug/dL Final    Iron Saturation 04/22/2023 9 (L)  15 - 50 % Final    Ferritin 04/22/2023 27.4  15.0 - 150.0 ng/mL Final    GGT 04/22/2023 12  5 - 36 U/L Final   Orders Only on 04/13/2023   Component Date Value Ref Range Status    Visual Acuity Distance Right Eye 04/18/2023 20/100   Final    Intraocular Pressure Eye 04/18/2023 16   Final    Visual Acuity Distance Left Eye 04/18/2023 20/80   Final    Intraocular Pressure Eye 04/18/2023 13   Final    Diabetic Retinopathy 04/18/2023 POSITIVE - MONITOR   Final    Cataracts 04/18/2023 NEGATIVE   Final    Glaucoma 04/18/2023 NEGATIVE   Final   Orders Only on 02/13/2023   Component Date Value Ref Range Status    Hemoglobin A1C 02/13/2023 7.3  See comment % Final    eAG 02/13/2023 162.8  mg/dL Final    Vit D, 25-Hydroxy 02/13/2023 75.2  >=30 ng/mL Final   Orders Only on 10/24/2022   Component Date Value Ref Range Status    IgA 10/21/2022 307.0  70.0 - 400.0 mg/dL Final    Tissue Transglutaminase IgA 10/21/2022 <0.5  0.0 - 14.0 U/mL Final   Orders Only on 10/21/2022   Component Date Value Ref Range Status

## 2023-06-11 DIAGNOSIS — E11.3299 TYPE 2 DIABETES MELLITUS WITH BACKGROUND RETINOPATHY (HCC): ICD-10-CM

## 2023-06-12 RX ORDER — TIRZEPATIDE 5 MG/.5ML
INJECTION, SOLUTION SUBCUTANEOUS
Refills: 3 | OUTPATIENT
Start: 2023-06-12

## 2023-07-31 ENCOUNTER — OFFICE VISIT (OUTPATIENT)
Dept: ENDOCRINOLOGY | Age: 58
End: 2023-07-31
Payer: COMMERCIAL

## 2023-07-31 VITALS
BODY MASS INDEX: 31.8 KG/M2 | DIASTOLIC BLOOD PRESSURE: 64 MMHG | SYSTOLIC BLOOD PRESSURE: 100 MMHG | WEIGHT: 202.6 LBS | HEART RATE: 96 BPM | HEIGHT: 67 IN | OXYGEN SATURATION: 97 %

## 2023-07-31 DIAGNOSIS — E11.21 TYPE 2 DIABETES MELLITUS WITH DIABETIC NEPHROPATHY, WITH LONG-TERM CURRENT USE OF INSULIN (HCC): Primary | ICD-10-CM

## 2023-07-31 DIAGNOSIS — Z79.4 TYPE 2 DIABETES MELLITUS WITH DIABETIC NEPHROPATHY, WITH LONG-TERM CURRENT USE OF INSULIN (HCC): Primary | ICD-10-CM

## 2023-07-31 PROCEDURE — 1036F TOBACCO NON-USER: CPT | Performed by: INTERNAL MEDICINE

## 2023-07-31 PROCEDURE — 3017F COLORECTAL CA SCREEN DOC REV: CPT | Performed by: INTERNAL MEDICINE

## 2023-07-31 PROCEDURE — G9899 SCRN MAM PERF RSLTS DOC: HCPCS | Performed by: INTERNAL MEDICINE

## 2023-07-31 PROCEDURE — G8427 DOCREV CUR MEDS BY ELIG CLIN: HCPCS | Performed by: INTERNAL MEDICINE

## 2023-07-31 PROCEDURE — 3074F SYST BP LT 130 MM HG: CPT | Performed by: INTERNAL MEDICINE

## 2023-07-31 PROCEDURE — 2022F DILAT RTA XM EVC RTNOPTHY: CPT | Performed by: INTERNAL MEDICINE

## 2023-07-31 PROCEDURE — G8417 CALC BMI ABV UP PARAM F/U: HCPCS | Performed by: INTERNAL MEDICINE

## 2023-07-31 PROCEDURE — 3078F DIAST BP <80 MM HG: CPT | Performed by: INTERNAL MEDICINE

## 2023-07-31 PROCEDURE — 99214 OFFICE O/P EST MOD 30 MIN: CPT | Performed by: INTERNAL MEDICINE

## 2023-07-31 PROCEDURE — 3051F HG A1C>EQUAL 7.0%<8.0%: CPT | Performed by: INTERNAL MEDICINE

## 2023-07-31 RX ORDER — TIRZEPATIDE 5 MG/.5ML
5 INJECTION, SOLUTION SUBCUTANEOUS WEEKLY
Qty: 6 ML | Refills: 1 | Status: SHIPPED | OUTPATIENT
Start: 2023-07-31

## 2023-07-31 NOTE — PROGRESS NOTES
Patient ID:   Olena Fuller is a 63 y.o. female    Chief Complaint:   Olena Fuller presents for an evaluation of Type 2 Diabetes Mellitus , Hyperlipidemia and hypertension. Subjective:   Type 2 Diabetes Mellitus diagnosed at age 35-27. On insulin since Nov 2017. Invokana caused UTI in past.     She stopped [place] for cramping. She did not like Basalar or Lantus. She did not like Trulicity as much as Ozempic     stage 1 breast cancer. Will get chemotherapy     Metformin ER 500mg, two tabs twice daily       Actos 15 mg daily   Jardiance 25mg daily. She takes half in am. High dose caused multiple UTI's  Mounjaro 2.5 mg weekly on Saturday. HIGHER DOSE CAUSED stomach pains. Zofran did not help     Weight is stable      Checks blood sugars every other day. Meter reviewed , 142 today fasting. Range 142 - 233. 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 Occasional snacks (chips, pretzels). Diet pepsi. Exercise: Walk around the work during lunch break. Denies chest pain, exertional dyspnea. Family history of CAD: strokes in multiple family members (aunts and uncles)    Denies smoking/alcohol.    Currently on ASA 81 mg daily     The following portions of the patient's history were reviewed and updated as appropriate:       Family History   Problem Relation Age of Onset    Diabetes Mother     High Blood Pressure Mother     High Cholesterol Mother     Uterine Cancer Mother     High Blood Pressure Father     Other Father         cardiac amyloidosis    Heart Disease Sister     Diabetes Maternal Aunt     Stroke Maternal Aunt     Heart Disease Maternal Uncle     Cancer Maternal Uncle     Cancer Maternal Grandmother     Diabetes Maternal Grandfather     Diabetes Maternal Aunt     Stroke Maternal Aunt     Diabetes Maternal Aunt     Stroke Maternal Aunt     Heart Disease Maternal Aunt     No Known Problems Brother     No

## 2023-08-04 RX ORDER — SEMAGLUTIDE 2.68 MG/ML
INJECTION, SOLUTION SUBCUTANEOUS
OUTPATIENT
Start: 2023-08-04

## 2023-08-04 NOTE — TELEPHONE ENCOUNTER
Requested Refill:   Requested Prescriptions     Pending Prescriptions Disp Refills    OZEMPIC, 2 MG/DOSE, 8 MG/3ML SOPN [Pharmacy Med Name: Negley Barrier 8 MG/3 ML (2 MG/DOSE)]       Sig: INJECT 2MG SUBCUTANEOUSLY EVERY WEEK       Records show she is taking [facility]

## 2023-09-05 DIAGNOSIS — E11.9 TYPE 2 DIABETES MELLITUS WITHOUT COMPLICATION, WITH LONG-TERM CURRENT USE OF INSULIN (HCC): ICD-10-CM

## 2023-09-05 DIAGNOSIS — E11.3299 TYPE 2 DIABETES MELLITUS WITH BACKGROUND RETINOPATHY (HCC): ICD-10-CM

## 2023-09-05 DIAGNOSIS — Z79.4 TYPE 2 DIABETES MELLITUS WITHOUT COMPLICATION, WITH LONG-TERM CURRENT USE OF INSULIN (HCC): ICD-10-CM

## 2023-09-05 DIAGNOSIS — E55.9 VITAMIN D DEFICIENCY: ICD-10-CM

## 2023-09-05 DIAGNOSIS — E11.21 TYPE 2 DIABETES MELLITUS WITH DIABETIC NEPHROPATHY, WITH LONG-TERM CURRENT USE OF INSULIN (HCC): ICD-10-CM

## 2023-09-05 DIAGNOSIS — Z79.4 TYPE 2 DIABETES MELLITUS WITH DIABETIC NEPHROPATHY, WITH LONG-TERM CURRENT USE OF INSULIN (HCC): ICD-10-CM

## 2023-09-05 RX ORDER — ERGOCALCIFEROL 1.25 MG/1
50000 CAPSULE ORAL WEEKLY
Qty: 8 CAPSULE | Refills: 6 | Status: SHIPPED | OUTPATIENT
Start: 2023-09-05

## 2023-09-05 RX ORDER — PIOGLITAZONEHYDROCHLORIDE 15 MG/1
TABLET ORAL
Qty: 30 TABLET | Refills: 5 | Status: SHIPPED | OUTPATIENT
Start: 2023-09-05

## 2023-09-05 NOTE — TELEPHONE ENCOUNTER
Requested Refill:   Requested Prescriptions     Pending Prescriptions Disp Refills    pioglitazone (ACTOS) 15 MG tablet [Pharmacy Med Name: PIOGLITAZONE HCL 15 MG TABLET] 30 tablet 5     Sig: TAKE 1 TABLET BY MOUTH EVERY DAY       Last refilled: 11/18/2022 # 30 with 5 refills     Last Appt: 7/31/2023  Next Appt: 10/30/2023

## 2023-09-05 NOTE — TELEPHONE ENCOUNTER
Requested Refill:   Requested Prescriptions     Pending Prescriptions Disp Refills    vitamin D (ERGOCALCIFEROL) 1.25 MG (47516 UT) CAPS capsule [Pharmacy Med Name: VITAMIN D2 1.25MG(50,000 UNIT)] 8 capsule 6     Sig: TAKE 1 CAPSULE BY MOUTH TWICE WEEKLY       Last refilled: 10/3/2022    Last Appt: 7/31/2023  Next Appt: 10/30/2023

## 2023-09-28 ENCOUNTER — TELEPHONE (OUTPATIENT)
Dept: ENDOCRINOLOGY | Age: 58
End: 2023-09-28

## 2023-09-28 DIAGNOSIS — E11.21 TYPE 2 DIABETES MELLITUS WITH DIABETIC NEPHROPATHY, WITH LONG-TERM CURRENT USE OF INSULIN (HCC): ICD-10-CM

## 2023-09-28 DIAGNOSIS — Z79.4 TYPE 2 DIABETES MELLITUS WITHOUT COMPLICATION, WITH LONG-TERM CURRENT USE OF INSULIN (HCC): ICD-10-CM

## 2023-09-28 DIAGNOSIS — E11.3299 TYPE 2 DIABETES MELLITUS WITH BACKGROUND RETINOPATHY (HCC): ICD-10-CM

## 2023-09-28 DIAGNOSIS — Z79.4 TYPE 2 DIABETES MELLITUS WITH DIABETIC NEPHROPATHY, WITH LONG-TERM CURRENT USE OF INSULIN (HCC): ICD-10-CM

## 2023-09-28 DIAGNOSIS — E11.9 TYPE 2 DIABETES MELLITUS WITHOUT COMPLICATION, WITH LONG-TERM CURRENT USE OF INSULIN (HCC): ICD-10-CM

## 2023-09-28 NOTE — TELEPHONE ENCOUNTER
Requested Refill:   Requested Prescriptions     Pending Prescriptions Disp Refills    empagliflozin (JARDIANCE) 25 MG tablet [Pharmacy Med Name: Juliet Grafa 25 MG TABLET] 30 tablet 5     Sig: TAKE 1 TABLET BY MOUTH EVERY DAY       Last refilled:  10/3/2022  # 30 with 5 refills     Last Appt: 7/31/2023   Cancelled :9/25/2023  Next Appt: 10/30/2023

## 2023-09-28 NOTE — TELEPHONE ENCOUNTER
RX received for Jardiance 25 mg with two set of directions. Take half daily and take one daily. New RX sent with directions take half daily.

## 2023-10-25 DIAGNOSIS — E11.9 TYPE 2 DIABETES MELLITUS WITHOUT COMPLICATION, WITH LONG-TERM CURRENT USE OF INSULIN (HCC): ICD-10-CM

## 2023-10-25 DIAGNOSIS — Z79.4 TYPE 2 DIABETES MELLITUS WITHOUT COMPLICATION, WITH LONG-TERM CURRENT USE OF INSULIN (HCC): ICD-10-CM

## 2023-10-25 DIAGNOSIS — E11.3299 TYPE 2 DIABETES MELLITUS WITH BACKGROUND RETINOPATHY (HCC): ICD-10-CM

## 2023-10-25 RX ORDER — METFORMIN HYDROCHLORIDE 500 MG/1
TABLET, EXTENDED RELEASE ORAL
Qty: 120 TABLET | Refills: 0 | Status: SHIPPED | OUTPATIENT
Start: 2023-10-25

## 2023-10-25 NOTE — TELEPHONE ENCOUNTER
Requested Refill:   Requested Prescriptions     Pending Prescriptions Disp Refills    metFORMIN (GLUCOPHAGE-XR) 500 MG extended release tablet [Pharmacy Med Name: METFORMIN HCL  MG TABLET] 120 tablet 5     Sig: TAKE 2 TABLETS BY MOUTH TWICE A DAY WITH MEALS       Last refilled: 10/3/2022 # 120 with 5 refills     Last Appt: 7/31/2023   Next Appt: 10/30/2023

## 2023-10-29 DIAGNOSIS — E11.3299 TYPE 2 DIABETES MELLITUS WITH BACKGROUND RETINOPATHY (HCC): ICD-10-CM

## 2023-10-29 DIAGNOSIS — E11.9 TYPE 2 DIABETES MELLITUS WITHOUT COMPLICATION, WITH LONG-TERM CURRENT USE OF INSULIN (HCC): ICD-10-CM

## 2023-10-29 DIAGNOSIS — E11.21 TYPE 2 DIABETES MELLITUS WITH DIABETIC NEPHROPATHY, WITH LONG-TERM CURRENT USE OF INSULIN (HCC): ICD-10-CM

## 2023-10-29 DIAGNOSIS — Z79.4 TYPE 2 DIABETES MELLITUS WITH DIABETIC NEPHROPATHY, WITH LONG-TERM CURRENT USE OF INSULIN (HCC): ICD-10-CM

## 2023-10-29 DIAGNOSIS — Z79.4 TYPE 2 DIABETES MELLITUS WITHOUT COMPLICATION, WITH LONG-TERM CURRENT USE OF INSULIN (HCC): ICD-10-CM

## 2023-10-30 ENCOUNTER — OFFICE VISIT (OUTPATIENT)
Dept: ENDOCRINOLOGY | Age: 58
End: 2023-10-30
Payer: COMMERCIAL

## 2023-10-30 VITALS
BODY MASS INDEX: 32.02 KG/M2 | OXYGEN SATURATION: 96 % | HEART RATE: 92 BPM | WEIGHT: 204 LBS | SYSTOLIC BLOOD PRESSURE: 128 MMHG | HEIGHT: 67 IN | DIASTOLIC BLOOD PRESSURE: 62 MMHG

## 2023-10-30 DIAGNOSIS — Z79.4 TYPE 2 DIABETES MELLITUS WITH DIABETIC NEPHROPATHY, WITH LONG-TERM CURRENT USE OF INSULIN (HCC): ICD-10-CM

## 2023-10-30 DIAGNOSIS — E11.21 TYPE 2 DIABETES MELLITUS WITH DIABETIC NEPHROPATHY, WITH LONG-TERM CURRENT USE OF INSULIN (HCC): ICD-10-CM

## 2023-10-30 DIAGNOSIS — E11.3299 TYPE 2 DIABETES MELLITUS WITH BACKGROUND RETINOPATHY (HCC): ICD-10-CM

## 2023-10-30 DIAGNOSIS — Z79.4 TYPE 2 DIABETES MELLITUS WITHOUT COMPLICATION, WITH LONG-TERM CURRENT USE OF INSULIN (HCC): Primary | ICD-10-CM

## 2023-10-30 DIAGNOSIS — E11.9 TYPE 2 DIABETES MELLITUS WITHOUT COMPLICATION, WITH LONG-TERM CURRENT USE OF INSULIN (HCC): Primary | ICD-10-CM

## 2023-10-30 DIAGNOSIS — I10 ESSENTIAL HYPERTENSION: ICD-10-CM

## 2023-10-30 DIAGNOSIS — E55.9 VITAMIN D DEFICIENCY: ICD-10-CM

## 2023-10-30 PROCEDURE — G9899 SCRN MAM PERF RSLTS DOC: HCPCS | Performed by: INTERNAL MEDICINE

## 2023-10-30 PROCEDURE — G8427 DOCREV CUR MEDS BY ELIG CLIN: HCPCS | Performed by: INTERNAL MEDICINE

## 2023-10-30 PROCEDURE — 3051F HG A1C>EQUAL 7.0%<8.0%: CPT | Performed by: INTERNAL MEDICINE

## 2023-10-30 PROCEDURE — 2022F DILAT RTA XM EVC RTNOPTHY: CPT | Performed by: INTERNAL MEDICINE

## 2023-10-30 PROCEDURE — 1036F TOBACCO NON-USER: CPT | Performed by: INTERNAL MEDICINE

## 2023-10-30 PROCEDURE — 99214 OFFICE O/P EST MOD 30 MIN: CPT | Performed by: INTERNAL MEDICINE

## 2023-10-30 PROCEDURE — G8484 FLU IMMUNIZE NO ADMIN: HCPCS | Performed by: INTERNAL MEDICINE

## 2023-10-30 PROCEDURE — 3017F COLORECTAL CA SCREEN DOC REV: CPT | Performed by: INTERNAL MEDICINE

## 2023-10-30 PROCEDURE — G8417 CALC BMI ABV UP PARAM F/U: HCPCS | Performed by: INTERNAL MEDICINE

## 2023-10-30 PROCEDURE — 3074F SYST BP LT 130 MM HG: CPT | Performed by: INTERNAL MEDICINE

## 2023-10-30 PROCEDURE — 3078F DIAST BP <80 MM HG: CPT | Performed by: INTERNAL MEDICINE

## 2023-10-30 RX ORDER — EMPAGLIFLOZIN 25 MG/1
12.5 TABLET, FILM COATED ORAL DAILY
Qty: 15 TABLET | Refills: 0 | Status: SHIPPED | OUTPATIENT
Start: 2023-10-30

## 2023-10-30 NOTE — PROGRESS NOTES
Absolute 04/22/2023 1.3  1.0 - 5.1 K/uL Final    Monocytes Absolute 04/22/2023 0.5  0.0 - 1.3 K/uL Final    Eosinophils Absolute 04/22/2023 0.4  0.0 - 0.6 K/uL Final    Basophils Absolute 04/22/2023 0.1  0.0 - 0.2 K/uL Final    Sodium 04/22/2023 136  136 - 145 mmol/L Final    Potassium 04/22/2023 4.7  3.5 - 5.1 mmol/L Final    Chloride 04/22/2023 97 (L)  99 - 110 mmol/L Final    CO2 04/22/2023 26  21 - 32 mmol/L Final    Anion Gap 04/22/2023 13  3 - 16 Final    Glucose 04/22/2023 176 (H)  70 - 99 mg/dL Final    BUN 04/22/2023 27 (H)  7 - 20 mg/dL Final    Creatinine 04/22/2023 0.9  0.6 - 1.1 mg/dL Final    Est, Glom Filt Rate 04/22/2023 >60  >60 Final    Calcium 04/22/2023 10.0  8.3 - 10.6 mg/dL Final    Total Protein 04/22/2023 7.3  6.4 - 8.2 g/dL Final    Albumin 04/22/2023 4.2  3.4 - 5.0 g/dL Final    Alkaline Phosphatase 04/22/2023 160 (H)  40 - 129 U/L Final    ALT 04/22/2023 10  10 - 40 U/L Final    AST 04/22/2023 10 (L)  15 - 37 U/L Final    Total Bilirubin 04/22/2023 <0.2  0.0 - 1.0 mg/dL Final    Bilirubin, Direct 04/22/2023 <0.2  0.0 - 0.3 mg/dL Final    Bilirubin, Indirect 04/22/2023 see below  0.0 - 1.0 mg/dL Final   Office Visit on 04/21/2023   Component Date Value Ref Range Status    Iron 04/22/2023 35 (L)  37 - 145 ug/dL Final    TIBC 04/22/2023 403  260 - 445 ug/dL Final    Iron Saturation 04/22/2023 9 (L)  15 - 50 % Final    Ferritin 04/22/2023 27.4  15.0 - 150.0 ng/mL Final    GGT 04/22/2023 12  5 - 36 U/L Final   Orders Only on 04/13/2023   Component Date Value Ref Range Status    Visual Acuity Distance Right Eye 04/18/2023 20/100   Final    Intraocular Pressure Eye 04/18/2023 16   Final    Visual Acuity Distance Left Eye 04/18/2023 20/80   Final    Intraocular Pressure Eye 04/18/2023 13   Final    Diabetic Retinopathy 04/18/2023 POSITIVE - MONITOR   Final    Cataracts 04/18/2023 NEGATIVE   Final    Glaucoma 04/18/2023 NEGATIVE   Final   Orders Only on 02/13/2023   Component Date Value Ref Range

## 2023-10-30 NOTE — TELEPHONE ENCOUNTER
Medication:   Requested Prescriptions     Pending Prescriptions Disp Refills    JARDIANCE 25 MG tablet [Pharmacy Med Name: Yane Hammond 25 MG TABLET] 15 tablet 0     Sig: TAKE 1/2 TABLET BY MOUTH DAILY       Last Filled:      Patient Phone Number: [phone] (home) [phone] (work)    Last appt: 7/31/23   Next appt: 10/30/23    Last Labs DM:   Lab Results   Component Value Date/Time    LABA1C 7.5 04/22/2023 10:55 AM

## 2023-11-23 DIAGNOSIS — E11.9 TYPE 2 DIABETES MELLITUS WITHOUT COMPLICATION, WITH LONG-TERM CURRENT USE OF INSULIN (HCC): ICD-10-CM

## 2023-11-23 DIAGNOSIS — Z79.4 TYPE 2 DIABETES MELLITUS WITHOUT COMPLICATION, WITH LONG-TERM CURRENT USE OF INSULIN (HCC): ICD-10-CM

## 2023-11-23 DIAGNOSIS — E11.3299 TYPE 2 DIABETES MELLITUS WITH BACKGROUND RETINOPATHY (HCC): ICD-10-CM

## 2023-11-24 RX ORDER — METFORMIN HYDROCHLORIDE 500 MG/1
TABLET, EXTENDED RELEASE ORAL
Qty: 120 TABLET | Refills: 5 | Status: SHIPPED | OUTPATIENT
Start: 2023-11-24

## 2023-11-24 NOTE — TELEPHONE ENCOUNTER
Medication:   Requested Prescriptions     Pending Prescriptions Disp Refills    metFORMIN (GLUCOPHAGE-XR) 500 MG extended release tablet [Pharmacy Med Name: METFORMIN HCL  MG TABLET] 120 tablet 0     Sig: TAKE 2 TABLETS BY MOUTH TWICE A DAY WITH MEALS- REFILLS WILL BE GIVEN AT NEXT OFFICE VISIT       Last Filled:      Patient Phone Number: [phone] (home) [phone] (work)    Last appt: 10/30/2023   Next appt: 1/29/2024    Last Labs DM:   Lab Results   Component Value Date/Time    LABA1C 7.5 04/22/2023 10:55 AM

## 2023-12-02 DIAGNOSIS — E11.21 TYPE 2 DIABETES MELLITUS WITH DIABETIC NEPHROPATHY, WITH LONG-TERM CURRENT USE OF INSULIN (HCC): ICD-10-CM

## 2023-12-02 DIAGNOSIS — E11.3299 TYPE 2 DIABETES MELLITUS WITH BACKGROUND RETINOPATHY (HCC): ICD-10-CM

## 2023-12-02 DIAGNOSIS — E11.9 TYPE 2 DIABETES MELLITUS WITHOUT COMPLICATION, WITH LONG-TERM CURRENT USE OF INSULIN (HCC): ICD-10-CM

## 2023-12-02 DIAGNOSIS — Z79.4 TYPE 2 DIABETES MELLITUS WITH DIABETIC NEPHROPATHY, WITH LONG-TERM CURRENT USE OF INSULIN (HCC): ICD-10-CM

## 2023-12-02 DIAGNOSIS — Z79.4 TYPE 2 DIABETES MELLITUS WITHOUT COMPLICATION, WITH LONG-TERM CURRENT USE OF INSULIN (HCC): ICD-10-CM

## 2023-12-04 RX ORDER — EMPAGLIFLOZIN 25 MG/1
12.5 TABLET, FILM COATED ORAL DAILY
Qty: 15 TABLET | Refills: 0 | Status: SHIPPED | OUTPATIENT
Start: 2023-12-04

## 2023-12-11 ENCOUNTER — PATIENT MESSAGE (OUTPATIENT)
Dept: INTERNAL MEDICINE CLINIC | Age: 58
End: 2023-12-11

## 2023-12-11 RX ORDER — OMEPRAZOLE 40 MG/1
CAPSULE, DELAYED RELEASE ORAL
Qty: 30 CAPSULE | Refills: 3 | Status: SHIPPED | OUTPATIENT
Start: 2023-12-11

## 2023-12-11 NOTE — TELEPHONE ENCOUNTER
From: Tosin Pa  Sent: 12/11/2023 11:19 AM EST  To: [facility]  Subject: appointment    What medicine is being requested and I'll schedule an appointment.

## 2023-12-11 NOTE — TELEPHONE ENCOUNTER
Last appointment: 4/21/2023  Next appointment: Visit date not found  Last refill: 4/21/2023  Sent [employer] message to schedule due/overdue appointment.

## 2024-01-03 ENCOUNTER — OFFICE VISIT (OUTPATIENT)
Dept: INTERNAL MEDICINE CLINIC | Age: 59
End: 2024-01-03
Payer: COMMERCIAL

## 2024-01-03 VITALS
HEIGHT: 67 IN | RESPIRATION RATE: 18 BRPM | SYSTOLIC BLOOD PRESSURE: 120 MMHG | OXYGEN SATURATION: 100 % | BODY MASS INDEX: 31.39 KG/M2 | HEART RATE: 89 BPM | WEIGHT: 200 LBS | DIASTOLIC BLOOD PRESSURE: 68 MMHG

## 2024-01-03 DIAGNOSIS — G62.9 NEUROPATHY: ICD-10-CM

## 2024-01-03 DIAGNOSIS — Z79.4 TYPE 2 DIABETES MELLITUS WITH DIABETIC NEPHROPATHY, WITH LONG-TERM CURRENT USE OF INSULIN (HCC): ICD-10-CM

## 2024-01-03 DIAGNOSIS — C50.919 INVASIVE LOBULAR CARCINOMA OF BREAST IN FEMALE (HCC): ICD-10-CM

## 2024-01-03 DIAGNOSIS — I10 ESSENTIAL HYPERTENSION: Primary | ICD-10-CM

## 2024-01-03 DIAGNOSIS — E11.21 TYPE 2 DIABETES MELLITUS WITH DIABETIC NEPHROPATHY, WITH LONG-TERM CURRENT USE OF INSULIN (HCC): ICD-10-CM

## 2024-01-03 PROCEDURE — G8417 CALC BMI ABV UP PARAM F/U: HCPCS | Performed by: INTERNAL MEDICINE

## 2024-01-03 PROCEDURE — 3078F DIAST BP <80 MM HG: CPT | Performed by: INTERNAL MEDICINE

## 2024-01-03 PROCEDURE — 99214 OFFICE O/P EST MOD 30 MIN: CPT | Performed by: INTERNAL MEDICINE

## 2024-01-03 PROCEDURE — 3017F COLORECTAL CA SCREEN DOC REV: CPT | Performed by: INTERNAL MEDICINE

## 2024-01-03 PROCEDURE — G8427 DOCREV CUR MEDS BY ELIG CLIN: HCPCS | Performed by: INTERNAL MEDICINE

## 2024-01-03 PROCEDURE — G8484 FLU IMMUNIZE NO ADMIN: HCPCS | Performed by: INTERNAL MEDICINE

## 2024-01-03 PROCEDURE — 1036F TOBACCO NON-USER: CPT | Performed by: INTERNAL MEDICINE

## 2024-01-03 PROCEDURE — G9899 SCRN MAM PERF RSLTS DOC: HCPCS | Performed by: INTERNAL MEDICINE

## 2024-01-03 PROCEDURE — 3046F HEMOGLOBIN A1C LEVEL >9.0%: CPT | Performed by: INTERNAL MEDICINE

## 2024-01-03 PROCEDURE — 2022F DILAT RTA XM EVC RTNOPTHY: CPT | Performed by: INTERNAL MEDICINE

## 2024-01-03 PROCEDURE — 3074F SYST BP LT 130 MM HG: CPT | Performed by: INTERNAL MEDICINE

## 2024-01-03 RX ORDER — PREGABALIN 25 MG/1
25 CAPSULE ORAL 2 TIMES DAILY
Qty: 60 CAPSULE | Refills: 0 | Status: SHIPPED | OUTPATIENT
Start: 2024-01-03 | End: 2024-02-02

## 2024-01-03 RX ORDER — DULOXETIN HYDROCHLORIDE 30 MG/1
30 CAPSULE, DELAYED RELEASE ORAL DAILY
COMMUNITY
Start: 2023-10-31

## 2024-01-03 RX ORDER — OMEPRAZOLE 40 MG/1
40 CAPSULE, DELAYED RELEASE ORAL DAILY PRN
Qty: 30 CAPSULE | Refills: 3
Start: 2024-01-03

## 2024-01-03 ASSESSMENT — PATIENT HEALTH QUESTIONNAIRE - PHQ9
SUM OF ALL RESPONSES TO PHQ QUESTIONS 1-9: 0
2. FEELING DOWN, DEPRESSED OR HOPELESS: 0
1. LITTLE INTEREST OR PLEASURE IN DOING THINGS: 0
SUM OF ALL RESPONSES TO PHQ9 QUESTIONS 1 & 2: 0

## 2024-01-03 NOTE — PROGRESS NOTES
Manda arora this month.    Nausea, and dizziness with gabapentin but has tolerated lyrica in the past.     Off losartan still . Can't remember who or when stopped. BP good.     Review of Systems    Outpatient Medications Marked as Taking for the 1/3/24 encounter (Office Visit) with Kamala Christian MD   Medication Sig Dispense Refill    DULoxetine (CYMBALTA) 30 MG extended release capsule Take 1 capsule by mouth daily      omeprazole (PRILOSEC) 40 MG delayed release capsule Take 1 capsule by mouth daily as needed (heartburn) 30 capsule 3    pregabalin (LYRICA) 25 MG capsule Take 1 capsule by mouth 2 times daily for 30 days. Max Daily Amount: 50 mg 60 capsule 0    Trastuzumab (HERCEPTIN IV) Infuse intravenously every 21 days      JARDIANCE 25 MG tablet TAKE 1/2 TABLET BY MOUTH DAILY 15 tablet 0    metFORMIN (GLUCOPHAGE-XR) 500 MG extended release tablet TAKE 2 TABLETS BY MOUTH TWICE A DAY WITH MEALS- REFILLS WILL BE GIVEN AT NEXT OFFICE VISIT 120 tablet 5    nystatin-triamcinolone (MYCOLOG II) 722603-2.1 UNIT/GM-% cream Apply topically 4 times daily      Loperamide HCl (IMODIUM PO) Take by mouth as needed      pioglitazone (ACTOS) 15 MG tablet TAKE 1 TABLET BY MOUTH EVERY DAY 30 tablet 5    vitamin D (ERGOCALCIFEROL) 1.25 MG (24945 UT) CAPS capsule Take 1 capsule by mouth once a week 8 capsule 6    Tirzepatide (MOUNJARO) 5 MG/0.5ML SOPN SC injection Inject 0.5 mLs into the skin once a week (Patient taking differently: Inject 0.5 mLs into the skin once a week Mondays) 6 mL 1    diclofenac sodium (VOLTAREN) 1 % GEL Apply 4 g topically 4 times daily 100 g 0    hydroCHLOROthiazide (HYDRODIURIL) 25 MG tablet TAKE 1 TABLET BY MOUTH EVERY DAY 30 tablet 5    blood glucose test strips (ONETOUCH ULTRA) strip 3 each by In Vitro route daily As needed. 300 each 3    spironolactone (ALDACTONE) 50 MG tablet TAKE 1 TABLET BY MOUTH TWICE A  tablet 3    fluticasone (FLONASE) 50 MCG/ACT nasal spray SPRAY 1 SPRAY INTO EACH NOSTRIL

## 2024-01-03 NOTE — ASSESSMENT & PLAN NOTE
Hands and feet, secondary to chemo.  Remains symptomatic on Cymbalta 30 mg.  Previously has used Lyrica successfully.  Side effects with gabapentin.  Start Lyrica 25 mg twice daily.  Patient will call or send message if symptoms inadequately controlled and needs titration.

## 2024-01-03 NOTE — ASSESSMENT & PLAN NOTE
Status postlumpectomy, chemo, on neoadjuvant therapy.  Will have axillary lymph node dissection in February 7th.  Patient does not think she needs a preop. Will call to schedule if learns otherwise.

## 2024-01-03 NOTE — ASSESSMENT & PLAN NOTE
Well-controlled, continue same meds.    Off ARB (losartan), appears that she came off in 2022.  Did not clear who stopped it.  No microalbuminuria.  Consider restart in the future but hold off until has completed cancer treatments.

## 2024-01-06 DIAGNOSIS — Z79.4 TYPE 2 DIABETES MELLITUS WITHOUT COMPLICATION, WITH LONG-TERM CURRENT USE OF INSULIN (HCC): ICD-10-CM

## 2024-01-06 DIAGNOSIS — Z79.4 TYPE 2 DIABETES MELLITUS WITH DIABETIC NEPHROPATHY, WITH LONG-TERM CURRENT USE OF INSULIN (HCC): ICD-10-CM

## 2024-01-06 DIAGNOSIS — E11.21 TYPE 2 DIABETES MELLITUS WITH DIABETIC NEPHROPATHY, WITH LONG-TERM CURRENT USE OF INSULIN (HCC): ICD-10-CM

## 2024-01-06 DIAGNOSIS — E11.9 TYPE 2 DIABETES MELLITUS WITHOUT COMPLICATION, WITH LONG-TERM CURRENT USE OF INSULIN (HCC): ICD-10-CM

## 2024-01-06 DIAGNOSIS — E11.3299 TYPE 2 DIABETES MELLITUS WITH BACKGROUND RETINOPATHY (HCC): ICD-10-CM

## 2024-01-08 RX ORDER — EMPAGLIFLOZIN 25 MG/1
12.5 TABLET, FILM COATED ORAL DAILY
Qty: 15 TABLET | Refills: 0 | Status: SHIPPED | OUTPATIENT
Start: 2024-01-08

## 2024-01-11 DIAGNOSIS — L68.0 HIRSUTISM: ICD-10-CM

## 2024-01-11 RX ORDER — SPIRONOLACTONE 50 MG/1
TABLET, FILM COATED ORAL
Qty: 60 TABLET | Refills: 11 | Status: SHIPPED | OUTPATIENT
Start: 2024-01-11

## 2024-01-18 DIAGNOSIS — E11.21 TYPE 2 DIABETES MELLITUS WITH DIABETIC NEPHROPATHY, WITH LONG-TERM CURRENT USE OF INSULIN (HCC): ICD-10-CM

## 2024-01-18 DIAGNOSIS — E11.3299 TYPE 2 DIABETES MELLITUS WITH BACKGROUND RETINOPATHY (HCC): ICD-10-CM

## 2024-01-18 DIAGNOSIS — I10 ESSENTIAL HYPERTENSION: ICD-10-CM

## 2024-01-18 DIAGNOSIS — E55.9 VITAMIN D DEFICIENCY: ICD-10-CM

## 2024-01-18 DIAGNOSIS — Z79.4 TYPE 2 DIABETES MELLITUS WITH DIABETIC NEPHROPATHY, WITH LONG-TERM CURRENT USE OF INSULIN (HCC): ICD-10-CM

## 2024-01-19 LAB
25(OH)D3 SERPL-MCNC: 37.1 NG/ML
ALBUMIN SERPL-MCNC: 4.3 G/DL (ref 3.4–5)
ALBUMIN/GLOB SERPL: 1.4 {RATIO} (ref 1.1–2.2)
ALP SERPL-CCNC: 191 U/L (ref 40–129)
ALT SERPL-CCNC: 17 U/L (ref 10–40)
ANION GAP SERPL CALCULATED.3IONS-SCNC: 14 MMOL/L (ref 3–16)
AST SERPL-CCNC: 15 U/L (ref 15–37)
BILIRUB SERPL-MCNC: <0.2 MG/DL (ref 0–1)
BUN SERPL-MCNC: 27 MG/DL (ref 7–20)
CALCIUM SERPL-MCNC: 9.3 MG/DL (ref 8.3–10.6)
CHLORIDE SERPL-SCNC: 103 MMOL/L (ref 99–110)
CHOLEST SERPL-MCNC: 200 MG/DL (ref 0–199)
CO2 SERPL-SCNC: 22 MMOL/L (ref 21–32)
CREAT SERPL-MCNC: 0.9 MG/DL (ref 0.6–1.1)
CREAT UR-MCNC: 53.9 MG/DL (ref 28–259)
EST. AVERAGE GLUCOSE BLD GHB EST-MCNC: 154.2 MG/DL
GFR SERPLBLD CREATININE-BSD FMLA CKD-EPI: >60 ML/MIN/{1.73_M2}
GLUCOSE SERPL-MCNC: 169 MG/DL (ref 70–99)
HBA1C MFR BLD: 7 %
HDLC SERPL-MCNC: 62 MG/DL (ref 40–60)
LDL CHOLESTEROL CALCULATED: 120 MG/DL
MICROALBUMIN UR DL<=1MG/L-MCNC: <1.2 MG/DL
MICROALBUMIN/CREAT UR: NORMAL MG/G (ref 0–30)
POTASSIUM SERPL-SCNC: 4.4 MMOL/L (ref 3.5–5.1)
PROT SERPL-MCNC: 7.3 G/DL (ref 6.4–8.2)
SODIUM SERPL-SCNC: 139 MMOL/L (ref 136–145)
TRIGL SERPL-MCNC: 88 MG/DL (ref 0–150)
VLDLC SERPL CALC-MCNC: 18 MG/DL

## 2024-01-23 LAB
SHBG SERPL-SCNC: 29 NMOL/L (ref 30–135)
TESTOST FREE SERPL-MCNC: 2.9 PG/ML (ref 0.6–3.8)
TESTOST SERPL-MCNC: 15 NG/DL (ref 20–70)

## 2024-01-29 ENCOUNTER — OFFICE VISIT (OUTPATIENT)
Dept: ENDOCRINOLOGY | Age: 59
End: 2024-01-29
Payer: COMMERCIAL

## 2024-01-29 VITALS
WEIGHT: 203.8 LBS | BODY MASS INDEX: 31.99 KG/M2 | HEART RATE: 94 BPM | OXYGEN SATURATION: 97 % | SYSTOLIC BLOOD PRESSURE: 110 MMHG | HEIGHT: 67 IN | DIASTOLIC BLOOD PRESSURE: 68 MMHG

## 2024-01-29 DIAGNOSIS — L68.0 HIRSUTISM: ICD-10-CM

## 2024-01-29 DIAGNOSIS — T50.905A DRUG-INDUCED NAUSEA AND VOMITING: ICD-10-CM

## 2024-01-29 DIAGNOSIS — R11.2 DRUG-INDUCED NAUSEA AND VOMITING: ICD-10-CM

## 2024-01-29 DIAGNOSIS — E11.21 TYPE 2 DIABETES MELLITUS WITH DIABETIC NEPHROPATHY, WITH LONG-TERM CURRENT USE OF INSULIN (HCC): ICD-10-CM

## 2024-01-29 DIAGNOSIS — I10 ESSENTIAL HYPERTENSION: ICD-10-CM

## 2024-01-29 DIAGNOSIS — E55.9 VITAMIN D DEFICIENCY: ICD-10-CM

## 2024-01-29 DIAGNOSIS — E11.9 TYPE 2 DIABETES MELLITUS WITHOUT COMPLICATION, WITH LONG-TERM CURRENT USE OF INSULIN (HCC): Primary | ICD-10-CM

## 2024-01-29 DIAGNOSIS — Z79.4 TYPE 2 DIABETES MELLITUS WITHOUT COMPLICATION, WITH LONG-TERM CURRENT USE OF INSULIN (HCC): Primary | ICD-10-CM

## 2024-01-29 DIAGNOSIS — Z79.4 TYPE 2 DIABETES MELLITUS WITH DIABETIC NEPHROPATHY, WITH LONG-TERM CURRENT USE OF INSULIN (HCC): ICD-10-CM

## 2024-01-29 DIAGNOSIS — E11.3299 TYPE 2 DIABETES MELLITUS WITH BACKGROUND RETINOPATHY (HCC): ICD-10-CM

## 2024-01-29 PROCEDURE — 3051F HG A1C>EQUAL 7.0%<8.0%: CPT | Performed by: INTERNAL MEDICINE

## 2024-01-29 PROCEDURE — 3074F SYST BP LT 130 MM HG: CPT | Performed by: INTERNAL MEDICINE

## 2024-01-29 PROCEDURE — 3078F DIAST BP <80 MM HG: CPT | Performed by: INTERNAL MEDICINE

## 2024-01-29 PROCEDURE — 1036F TOBACCO NON-USER: CPT | Performed by: INTERNAL MEDICINE

## 2024-01-29 PROCEDURE — G8427 DOCREV CUR MEDS BY ELIG CLIN: HCPCS | Performed by: INTERNAL MEDICINE

## 2024-01-29 PROCEDURE — G8417 CALC BMI ABV UP PARAM F/U: HCPCS | Performed by: INTERNAL MEDICINE

## 2024-01-29 PROCEDURE — G9899 SCRN MAM PERF RSLTS DOC: HCPCS | Performed by: INTERNAL MEDICINE

## 2024-01-29 PROCEDURE — 99214 OFFICE O/P EST MOD 30 MIN: CPT | Performed by: INTERNAL MEDICINE

## 2024-01-29 PROCEDURE — 2022F DILAT RTA XM EVC RTNOPTHY: CPT | Performed by: INTERNAL MEDICINE

## 2024-01-29 PROCEDURE — G8484 FLU IMMUNIZE NO ADMIN: HCPCS | Performed by: INTERNAL MEDICINE

## 2024-01-29 PROCEDURE — 3017F COLORECTAL CA SCREEN DOC REV: CPT | Performed by: INTERNAL MEDICINE

## 2024-01-29 NOTE — PROGRESS NOTES
01/18/2024 18  Not Established mg/dL Final    Vit D, 25-Hydroxy 01/18/2024 37.1  >=30 ng/mL Final    Sodium 01/18/2024 139  136 - 145 mmol/L Final    Potassium 01/18/2024 4.4  3.5 - 5.1 mmol/L Final    Chloride 01/18/2024 103  99 - 110 mmol/L Final    CO2 01/18/2024 22  21 - 32 mmol/L Final    Anion Gap 01/18/2024 14  3 - 16 Final    Glucose 01/18/2024 169 (H)  70 - 99 mg/dL Final    BUN 01/18/2024 27 (H)  7 - 20 mg/dL Final    Creatinine 01/18/2024 0.9  0.6 - 1.1 mg/dL Final    Est, Glom Filt Rate 01/18/2024 >60  >60 Final    Calcium 01/18/2024 9.3  8.3 - 10.6 mg/dL Final    Total Protein 01/18/2024 7.3  6.4 - 8.2 g/dL Final    Albumin 01/18/2024 4.3  3.4 - 5.0 g/dL Final    Albumin/Globulin Ratio 01/18/2024 1.4  1.1 - 2.2 Final    Total Bilirubin 01/18/2024 <0.2  0.0 - 1.0 mg/dL Final    Alkaline Phosphatase 01/18/2024 191 (H)  40 - 129 U/L Final    ALT 01/18/2024 17  10 - 40 U/L Final    AST 01/18/2024 15  15 - 37 U/L Final    Microalbumin, Random Urine 01/18/2024 <1.20  <2.0 mg/dL Final    Creatinine, Ur 01/18/2024 53.9  28.0 - 259.0 mg/dL Final    Microalbumin Creatinine Ratio 01/18/2024 see below  0.0 - 30.0 mg/g Final   Orders Only on 12/12/2023   Component Date Value Ref Range Status    Hemoglobin A1C 01/18/2024 7.2 (H)  4.0 - 5.6 % Final    ABO Grouping 01/18/2024 O   Final    Rh Factor 01/18/2024 Positive   Final    Antibody Screen 01/18/2024 Negative   Final   Orders Only on 06/15/2023   Component Date Value Ref Range Status    Left Ventricular Ejection Fraction 07/25/2023 53   Final-Edited    LVEF MODALITY 07/25/2023 ECHO   Final-Edited   Orders Only on 04/22/2023   Component Date Value Ref Range Status    Hemoglobin A1C 04/22/2023 7.5  See comment % Final    Estimated Avg Glucose 04/22/2023 168.6  mg/dL Final    Lipase 04/22/2023 30.0  13.0 - 60.0 U/L Final    WBC 04/22/2023 7.1  4.0 - 11.0 K/uL Final    RBC 04/22/2023 4.18  4.00 - 5.20 M/uL Final    Hemoglobin 04/22/2023 10.9 (L)  12.0 - 16.0 g/dL

## 2024-02-15 DIAGNOSIS — Z79.4 TYPE 2 DIABETES MELLITUS WITH DIABETIC NEPHROPATHY, WITH LONG-TERM CURRENT USE OF INSULIN (HCC): ICD-10-CM

## 2024-02-15 DIAGNOSIS — E11.3299 TYPE 2 DIABETES MELLITUS WITH BACKGROUND RETINOPATHY (HCC): ICD-10-CM

## 2024-02-15 DIAGNOSIS — Z79.4 TYPE 2 DIABETES MELLITUS WITHOUT COMPLICATION, WITH LONG-TERM CURRENT USE OF INSULIN (HCC): ICD-10-CM

## 2024-02-15 DIAGNOSIS — E11.21 TYPE 2 DIABETES MELLITUS WITH DIABETIC NEPHROPATHY, WITH LONG-TERM CURRENT USE OF INSULIN (HCC): ICD-10-CM

## 2024-02-15 DIAGNOSIS — E11.9 TYPE 2 DIABETES MELLITUS WITHOUT COMPLICATION, WITH LONG-TERM CURRENT USE OF INSULIN (HCC): ICD-10-CM

## 2024-02-15 RX ORDER — EMPAGLIFLOZIN 25 MG/1
12.5 TABLET, FILM COATED ORAL DAILY
Qty: 15 TABLET | Refills: 2 | Status: SHIPPED | OUTPATIENT
Start: 2024-02-15

## 2024-02-21 DIAGNOSIS — G62.9 NEUROPATHY: ICD-10-CM

## 2024-02-22 RX ORDER — PREGABALIN 25 MG/1
25 CAPSULE ORAL 2 TIMES DAILY
Qty: 60 CAPSULE | Refills: 5 | Status: SHIPPED | OUTPATIENT
Start: 2024-02-22 | End: 2024-08-20

## 2024-02-22 NOTE — TELEPHONE ENCOUNTER
Medication:   Requested Prescriptions     Pending Prescriptions Disp Refills    pregabalin (LYRICA) 25 MG capsule [Pharmacy Med Name: PREGABALIN 25 MG CAPSULE] 60 capsule 0     Sig: Take 1 capsule by mouth 2 times daily for 30 days. Max Daily Amount: 50 mg        Last Filled:   1/3/2024     Patient Phone Number: [phone] (home) [phone] (work)    Last appt: 1/3/2024   Next appt: 7/12/2024

## 2024-05-05 DIAGNOSIS — I10 ESSENTIAL HYPERTENSION: ICD-10-CM

## 2024-05-06 RX ORDER — HYDROCHLOROTHIAZIDE 25 MG/1
TABLET ORAL
Qty: 30 TABLET | Refills: 5 | Status: SHIPPED | OUTPATIENT
Start: 2024-05-06

## 2024-05-06 NOTE — TELEPHONE ENCOUNTER
Requested Prescriptions     Pending Prescriptions Disp Refills    hydroCHLOROthiazide (HYDRODIURIL) 25 MG tablet [Pharmacy Med Name: HYDROCHLOROTHIAZIDE 25 MG TAB] 30 tablet 5     Sig: TAKE 1 TABLET BY MOUTH EVERY DAY       [id number] - [place], [place] - [address] - P [phone] - F [phone]  [address]  [facility] [address]  Phone: [phone] Fax: [phone]    Last OV 01/29/2024  Next OV 06/03/2024

## 2024-06-24 DIAGNOSIS — E11.21 TYPE 2 DIABETES MELLITUS WITH DIABETIC NEPHROPATHY, WITH LONG-TERM CURRENT USE OF INSULIN (HCC): ICD-10-CM

## 2024-06-24 DIAGNOSIS — Z79.4 TYPE 2 DIABETES MELLITUS WITHOUT COMPLICATION, WITH LONG-TERM CURRENT USE OF INSULIN (HCC): ICD-10-CM

## 2024-06-24 DIAGNOSIS — E11.9 TYPE 2 DIABETES MELLITUS WITHOUT COMPLICATION, WITH LONG-TERM CURRENT USE OF INSULIN (HCC): ICD-10-CM

## 2024-06-24 DIAGNOSIS — Z79.4 TYPE 2 DIABETES MELLITUS WITH DIABETIC NEPHROPATHY, WITH LONG-TERM CURRENT USE OF INSULIN (HCC): ICD-10-CM

## 2024-06-24 DIAGNOSIS — E11.3299 TYPE 2 DIABETES MELLITUS WITH BACKGROUND RETINOPATHY (HCC): ICD-10-CM

## 2024-06-24 RX ORDER — PIOGLITAZONEHYDROCHLORIDE 15 MG/1
TABLET ORAL
Qty: 30 TABLET | Refills: 0 | Status: SHIPPED | OUTPATIENT
Start: 2024-06-24

## 2024-06-26 ENCOUNTER — TELEPHONE (OUTPATIENT)
Dept: ENDOCRINOLOGY | Age: 59
End: 2024-06-26

## 2024-07-01 ENCOUNTER — OFFICE VISIT (OUTPATIENT)
Dept: ENDOCRINOLOGY | Age: 59
End: 2024-07-01
Payer: COMMERCIAL

## 2024-07-01 VITALS
SYSTOLIC BLOOD PRESSURE: 90 MMHG | HEIGHT: 67 IN | DIASTOLIC BLOOD PRESSURE: 60 MMHG | OXYGEN SATURATION: 97 % | BODY MASS INDEX: 32.11 KG/M2 | HEART RATE: 99 BPM | WEIGHT: 204.6 LBS

## 2024-07-01 DIAGNOSIS — I10 ESSENTIAL HYPERTENSION: ICD-10-CM

## 2024-07-01 DIAGNOSIS — E11.21 TYPE 2 DIABETES MELLITUS WITH DIABETIC NEPHROPATHY, WITH LONG-TERM CURRENT USE OF INSULIN (HCC): Primary | ICD-10-CM

## 2024-07-01 DIAGNOSIS — E11.3299 TYPE 2 DIABETES MELLITUS WITH BACKGROUND RETINOPATHY (HCC): ICD-10-CM

## 2024-07-01 DIAGNOSIS — E55.9 VITAMIN D DEFICIENCY: ICD-10-CM

## 2024-07-01 DIAGNOSIS — Z79.4 TYPE 2 DIABETES MELLITUS WITH DIABETIC NEPHROPATHY, WITH LONG-TERM CURRENT USE OF INSULIN (HCC): Primary | ICD-10-CM

## 2024-07-01 PROCEDURE — 99214 OFFICE O/P EST MOD 30 MIN: CPT | Performed by: INTERNAL MEDICINE

## 2024-07-01 PROCEDURE — 3074F SYST BP LT 130 MM HG: CPT | Performed by: INTERNAL MEDICINE

## 2024-07-01 PROCEDURE — G9899 SCRN MAM PERF RSLTS DOC: HCPCS | Performed by: INTERNAL MEDICINE

## 2024-07-01 PROCEDURE — 3078F DIAST BP <80 MM HG: CPT | Performed by: INTERNAL MEDICINE

## 2024-07-01 PROCEDURE — 3044F HG A1C LEVEL LT 7.0%: CPT | Performed by: INTERNAL MEDICINE

## 2024-07-01 PROCEDURE — G8417 CALC BMI ABV UP PARAM F/U: HCPCS | Performed by: INTERNAL MEDICINE

## 2024-07-01 PROCEDURE — G8427 DOCREV CUR MEDS BY ELIG CLIN: HCPCS | Performed by: INTERNAL MEDICINE

## 2024-07-01 PROCEDURE — 3017F COLORECTAL CA SCREEN DOC REV: CPT | Performed by: INTERNAL MEDICINE

## 2024-07-01 PROCEDURE — 2022F DILAT RTA XM EVC RTNOPTHY: CPT | Performed by: INTERNAL MEDICINE

## 2024-07-01 PROCEDURE — 1036F TOBACCO NON-USER: CPT | Performed by: INTERNAL MEDICINE

## 2024-07-01 RX ORDER — HYDROCHLOROTHIAZIDE 12.5 MG/1
12.5 TABLET ORAL DAILY
Qty: 90 TABLET | Refills: 1 | Status: SHIPPED | OUTPATIENT
Start: 2024-07-01

## 2024-07-01 NOTE — PROGRESS NOTES
A1C; Future    Essential hypertension  -     hydroCHLOROthiazide 12.5 MG tablet; Take 1 tablet by mouth daily  -     Hemoglobin A1C; Future    Type 2 diabetes mellitus with background retinopathy (HCC)  -     Hemoglobin A1C; Future    Vitamin D deficiency  -     Hemoglobin A1C; Future             1: Type 2 DM complicated with nephropathy, retinopathy   Controlled A1C 6.9% < 7% < 7.1% (TCH) < 7.5% < 10.7% < 7.1% < 8.4% <  9% <  8.1% < 9.4% < 8.9% < 8.8% < 8.9 % < 8.7% < 9.7%     A1C is unreliable with iron deficiency anemia   Sister and mother also have anemia     Fructosamine 383 eq to A1C of 9.6%. consistent with A1C     Hb electrophoresis normal May 2021     Blood sugars improved     We can try to      C/w   Metformin ER 500mg, one tab with breakfast ( higher dose caused diarrhea with chemo, previously well tolerated)         Jardiance 12.5 mg daily in am. High dose caused multiple UTI's     C/w Mounjaro 7.5 mg weekly on Monday. C/e zofran   Increase fluid intake, avoid high fat diet     Stop   metformin and jardiance - 2 days before surgery . She said she is aware of it        All instructions provided in written.   Check Blood sugars 3 times per day. Log them along with insulin and send them every 2 weeks. Call for blood sugars less than 60 or more than 400.     Eye exam: Last exam in June 2024, Non PDR without ME, stable.    Foot exam:  Oct 2023. dystrophic nail with fungal infection of toes, extending to skin on left foot   Deformity/amputation: absent  Skin lesions/pre-ulcerative calluses: absent  Edema: right- negative, left- negative  Sensory exam: Monofilament sensation: normal  Pulses: normal, Vibration (128 Hz): mildly impaired     Renal screen: Normal Jan 2024     TSH screen: Sep 2022      2: HTN   Low   Cut HCTZ to 12.5 mg daily   Keep spironolactone for hirsutism     3: Hyperlipidemia    < 107 < 65 , HDL 62, Tgs 88 - Jan 2024     She stopped statins due to muscle cramps. She could not not

## 2024-07-01 NOTE — PATIENT INSTRUCTIONS
Cut HCTZ to 12.5 mg daily     Metformin ER 500mg, one tab with breakfast          Jardiance 12.5 mg daily in am. High dose caused multiple UTI's     C/w Mounjaro 7.5 mg weekly on Monday.      stop actos, if sugars go up restart it

## 2024-07-23 DIAGNOSIS — Z79.4 TYPE 2 DIABETES MELLITUS WITHOUT COMPLICATION, WITH LONG-TERM CURRENT USE OF INSULIN (HCC): ICD-10-CM

## 2024-07-23 DIAGNOSIS — E11.3299 TYPE 2 DIABETES MELLITUS WITH BACKGROUND RETINOPATHY (HCC): ICD-10-CM

## 2024-07-23 DIAGNOSIS — E11.9 TYPE 2 DIABETES MELLITUS WITHOUT COMPLICATION, WITH LONG-TERM CURRENT USE OF INSULIN (HCC): ICD-10-CM

## 2024-07-23 DIAGNOSIS — Z79.4 TYPE 2 DIABETES MELLITUS WITH DIABETIC NEPHROPATHY, WITH LONG-TERM CURRENT USE OF INSULIN (HCC): ICD-10-CM

## 2024-07-23 DIAGNOSIS — E11.21 TYPE 2 DIABETES MELLITUS WITH DIABETIC NEPHROPATHY, WITH LONG-TERM CURRENT USE OF INSULIN (HCC): ICD-10-CM

## 2024-07-23 RX ORDER — PIOGLITAZONEHYDROCHLORIDE 15 MG/1
TABLET ORAL
Qty: 30 TABLET | Refills: 0 | OUTPATIENT
Start: 2024-07-23

## 2024-07-24 DIAGNOSIS — E11.3299 TYPE 2 DIABETES MELLITUS WITH BACKGROUND RETINOPATHY (HCC): ICD-10-CM

## 2024-07-24 DIAGNOSIS — E11.21 TYPE 2 DIABETES MELLITUS WITH DIABETIC NEPHROPATHY, WITH LONG-TERM CURRENT USE OF INSULIN (HCC): ICD-10-CM

## 2024-07-24 DIAGNOSIS — Z79.4 TYPE 2 DIABETES MELLITUS WITH DIABETIC NEPHROPATHY, WITH LONG-TERM CURRENT USE OF INSULIN (HCC): ICD-10-CM

## 2024-07-24 DIAGNOSIS — E11.9 TYPE 2 DIABETES MELLITUS WITHOUT COMPLICATION, WITH LONG-TERM CURRENT USE OF INSULIN (HCC): ICD-10-CM

## 2024-07-24 DIAGNOSIS — Z79.4 TYPE 2 DIABETES MELLITUS WITHOUT COMPLICATION, WITH LONG-TERM CURRENT USE OF INSULIN (HCC): ICD-10-CM

## 2024-07-24 RX ORDER — EMPAGLIFLOZIN 25 MG/1
12.5 TABLET, FILM COATED ORAL DAILY
Qty: 15 TABLET | Refills: 2 | Status: SHIPPED | OUTPATIENT
Start: 2024-07-24

## 2024-09-30 DIAGNOSIS — Z79.4 TYPE 2 DIABETES MELLITUS WITH DIABETIC NEPHROPATHY, WITH LONG-TERM CURRENT USE OF INSULIN (HCC): ICD-10-CM

## 2024-09-30 DIAGNOSIS — E11.9 TYPE 2 DIABETES MELLITUS WITHOUT COMPLICATION, WITH LONG-TERM CURRENT USE OF INSULIN (HCC): ICD-10-CM

## 2024-09-30 DIAGNOSIS — Z79.4 TYPE 2 DIABETES MELLITUS WITHOUT COMPLICATION, WITH LONG-TERM CURRENT USE OF INSULIN (HCC): ICD-10-CM

## 2024-09-30 DIAGNOSIS — E11.21 TYPE 2 DIABETES MELLITUS WITH DIABETIC NEPHROPATHY, WITH LONG-TERM CURRENT USE OF INSULIN (HCC): ICD-10-CM

## 2024-09-30 RX ORDER — TIRZEPATIDE 7.5 MG/.5ML
INJECTION, SOLUTION SUBCUTANEOUS
Qty: 2 ML | Refills: 5 | Status: SHIPPED | OUTPATIENT
Start: 2024-09-30

## 2024-09-30 NOTE — TELEPHONE ENCOUNTER
Requested Prescriptions     Pending Prescriptions Disp Refills    MOUNJARO 7.5 MG/0.5ML SOPN SC injection [Pharmacy Med Name: MOUNJARO 7.5 MG/0.5 ML PEN]  5     Sig: INJECT 0.5 ML SUBCUTANEOUSLY ONE TIME PER WEEK     Last refilled: 1/29/2024 # 6 ml with 1 refill   LOV: 7/1/2024  NOV: 11/4/2024

## 2024-10-30 ENCOUNTER — HOSPITAL ENCOUNTER (EMERGENCY)
Age: 59
Discharge: HOME | End: 2024-10-30
Attending: EMERGENCY MEDICINE
Payer: COMMERCIAL

## 2024-10-30 ENCOUNTER — APPOINTMENT (OUTPATIENT)
Dept: GENERAL RADIOLOGY | Age: 59
End: 2024-10-30
Payer: COMMERCIAL

## 2024-10-30 VITALS
DIASTOLIC BLOOD PRESSURE: 69 MMHG | WEIGHT: 208.4 LBS | SYSTOLIC BLOOD PRESSURE: 110 MMHG | TEMPERATURE: 97.9 F | OXYGEN SATURATION: 100 % | HEIGHT: 67 IN | BODY MASS INDEX: 32.71 KG/M2 | RESPIRATION RATE: 16 BRPM | HEART RATE: 81 BPM

## 2024-10-30 DIAGNOSIS — M54.32 SCIATICA OF LEFT SIDE: Primary | ICD-10-CM

## 2024-10-30 PROCEDURE — 6360000002 HC RX W HCPCS: Performed by: EMERGENCY MEDICINE

## 2024-10-30 PROCEDURE — 6370000000 HC RX 637 (ALT 250 FOR IP): Performed by: EMERGENCY MEDICINE

## 2024-10-30 PROCEDURE — 73521 X-RAY EXAM HIPS BI 2 VIEWS: CPT

## 2024-10-30 PROCEDURE — 99284 EMERGENCY DEPT VISIT MOD MDM: CPT

## 2024-10-30 PROCEDURE — 96372 THER/PROPH/DIAG INJ SC/IM: CPT

## 2024-10-30 PROCEDURE — 72100 X-RAY EXAM L-S SPINE 2/3 VWS: CPT

## 2024-10-30 RX ORDER — KETOROLAC TROMETHAMINE 30 MG/ML
30 INJECTION, SOLUTION INTRAMUSCULAR; INTRAVENOUS ONCE
Status: COMPLETED | OUTPATIENT
Start: 2024-10-30 | End: 2024-10-30

## 2024-10-30 RX ORDER — OXYCODONE HYDROCHLORIDE 5 MG/1
5 TABLET ORAL 2 TIMES DAILY PRN
Qty: 10 TABLET | Refills: 0 | Status: SHIPPED | OUTPATIENT
Start: 2024-10-30 | End: 2024-11-02

## 2024-10-30 RX ORDER — OXYCODONE AND ACETAMINOPHEN 5; 325 MG/1; MG/1
1 TABLET ORAL ONCE
Status: COMPLETED | OUTPATIENT
Start: 2024-10-30 | End: 2024-10-30

## 2024-10-30 RX ORDER — METHOCARBAMOL 750 MG/1
750 TABLET, FILM COATED ORAL 2 TIMES DAILY PRN
Qty: 20 TABLET | Refills: 0 | Status: SHIPPED | OUTPATIENT
Start: 2024-10-30 | End: 2024-11-09

## 2024-10-30 RX ORDER — DEXAMETHASONE SODIUM PHOSPHATE 10 MG/ML
6 INJECTION, SOLUTION INTRAMUSCULAR; INTRAVENOUS ONCE
Status: COMPLETED | OUTPATIENT
Start: 2024-10-30 | End: 2024-10-30

## 2024-10-30 RX ADMIN — DEXAMETHASONE SODIUM PHOSPHATE 6 MG: 10 INJECTION INTRAMUSCULAR; INTRAVENOUS at 05:27

## 2024-10-30 RX ADMIN — KETOROLAC TROMETHAMINE 30 MG: 30 INJECTION INTRAMUSCULAR; INTRAVENOUS at 05:27

## 2024-10-30 RX ADMIN — OXYCODONE HYDROCHLORIDE AND ACETAMINOPHEN 1 TABLET: 5; 325 TABLET ORAL at 05:26

## 2024-10-30 ASSESSMENT — PAIN DESCRIPTION - ORIENTATION: ORIENTATION: LOWER;LEFT

## 2024-10-30 ASSESSMENT — PAIN - FUNCTIONAL ASSESSMENT
PAIN_FUNCTIONAL_ASSESSMENT: 0-10
PAIN_FUNCTIONAL_ASSESSMENT: 0-10

## 2024-10-30 ASSESSMENT — PAIN SCALES - GENERAL
PAINLEVEL_OUTOF10: 7
PAINLEVEL_OUTOF10: 10
PAINLEVEL_OUTOF10: 10

## 2024-10-30 ASSESSMENT — LIFESTYLE VARIABLES
HOW OFTEN DO YOU HAVE A DRINK CONTAINING ALCOHOL: 2-4 TIMES A MONTH
HOW MANY STANDARD DRINKS CONTAINING ALCOHOL DO YOU HAVE ON A TYPICAL DAY: 1 OR 2

## 2024-10-30 ASSESSMENT — PAIN DESCRIPTION - LOCATION: LOCATION: BACK;LEG

## 2024-10-30 NOTE — DISCHARGE INSTRUCTIONS
You can continue to take over-the-counter pain medication including ibuprofen and Tylenol and then you can additionally add oxycodone and Robaxin for severe pain.  Do not take oxycodone and Robaxin at the same time as it can decrease your breathing.  Follow-up with your orthopedic doctor at your scheduled appointment on 6th November.

## 2024-10-30 NOTE — ED PROVIDER NOTES
THE [facility]  EMERGENCY DEPARTMENT ENCOUNTER          ATTENDING PHYSICIAN NOTE       Date of evaluation: 10/30/2024    Chief Complaint     Back Pain (Patient presents to ED with report of lower back pain with radiation to left leg. Patient reports her symptoms have been ongoing for about 4 weeks. Reports difficulty finding a comfortable position.)      History of Present Illness     Tosin Pa is a 59 y.o. female who presents with a chief complaint of back pain.  Patient reports that back in August she fell off a chair.  She did not think she hurt herself but she went to urgent care to get checked out.  Never had x-rays.  Was feeling well for some time after that.  About 4 weeks ago noticed some pain in her lower back that seem to go down the back of her left leg.  Went to urgent care a week ago as the pain was getting more constant and interfering with her ability to sleep and her quality of life.  Was given a shot of steroids at a follow-up with orthopedic surgery.  Follow-up with orthopedic surgery is on November 6.  Patient states however over the last 48 hours the pain has gotten more severe.  Pain is still in her lower back and her left buttocks and radiates down her left leg.  Pain also radiates into her left groin.  Worse with standing and laying down, improved with sitting.  Denies any bowel or bladder incontinence.    ASSESSMENT / PLAN  (MEDICAL DECISION MAKING)     INITIAL VITALS: BP: (!) 147/73, Temp: 97.9 °F (36.6 °C), Pulse: 88, Respirations: 18, SpO2: 99 %      Tosin Pa is a 59 y.o. female who presents with a chief complaint of back pain.  Initial exam reveals well-appearing female in no acute distress with normal vitals, afebrile.  Physical exam remarkable for normal exam including normal heart and lungs.  Normal gait.    No red flag symptoms for spinal cord compression on exam.  I did obtain x-rays given her fall in August and her history of breast cancer.  X-rays revealed

## 2024-11-01 DIAGNOSIS — Z79.4 TYPE 2 DIABETES MELLITUS WITH DIABETIC NEPHROPATHY, WITH LONG-TERM CURRENT USE OF INSULIN (HCC): ICD-10-CM

## 2024-11-01 DIAGNOSIS — E11.9 TYPE 2 DIABETES MELLITUS WITHOUT COMPLICATION, WITH LONG-TERM CURRENT USE OF INSULIN (HCC): ICD-10-CM

## 2024-11-01 DIAGNOSIS — E11.21 TYPE 2 DIABETES MELLITUS WITH DIABETIC NEPHROPATHY, WITH LONG-TERM CURRENT USE OF INSULIN (HCC): ICD-10-CM

## 2024-11-01 DIAGNOSIS — Z79.4 TYPE 2 DIABETES MELLITUS WITHOUT COMPLICATION, WITH LONG-TERM CURRENT USE OF INSULIN (HCC): ICD-10-CM

## 2024-11-01 DIAGNOSIS — E11.3299 TYPE 2 DIABETES MELLITUS WITH BACKGROUND RETINOPATHY (HCC): ICD-10-CM

## 2024-11-01 RX ORDER — EMPAGLIFLOZIN 25 MG/1
12.5 TABLET, FILM COATED ORAL DAILY
Qty: 15 TABLET | Refills: 2 | Status: SHIPPED | OUTPATIENT
Start: 2024-11-01

## 2024-11-01 NOTE — TELEPHONE ENCOUNTER
Requested Prescriptions     Pending Prescriptions Disp Refills    JARDIANCE 25 MG tablet [Pharmacy Med Name: JARDIANCE 25 MG TABLET] 15 tablet 2     Sig: TAKE 1/2 TABLET BY MOUTH DAILY     Last refilled: 7/24/2024 # 15 with 2 refills     Lov:7/1/2024  Nov:11/4/2024

## 2024-11-06 ENCOUNTER — OFFICE VISIT (OUTPATIENT)
Dept: ORTHOPEDIC SURGERY | Age: 59
End: 2024-11-06
Payer: COMMERCIAL

## 2024-11-06 VITALS — HEIGHT: 67 IN | WEIGHT: 208 LBS | BODY MASS INDEX: 32.65 KG/M2

## 2024-11-06 DIAGNOSIS — M48.061 SPINAL STENOSIS OF LUMBAR REGION, UNSPECIFIED WHETHER NEUROGENIC CLAUDICATION PRESENT: ICD-10-CM

## 2024-11-06 DIAGNOSIS — M51.26 HNP (HERNIATED NUCLEUS PULPOSUS), LUMBAR: Primary | ICD-10-CM

## 2024-11-06 PROCEDURE — G8417 CALC BMI ABV UP PARAM F/U: HCPCS | Performed by: PHYSICIAN ASSISTANT

## 2024-11-06 PROCEDURE — 99204 OFFICE O/P NEW MOD 45 MIN: CPT | Performed by: PHYSICIAN ASSISTANT

## 2024-11-06 PROCEDURE — 1036F TOBACCO NON-USER: CPT | Performed by: PHYSICIAN ASSISTANT

## 2024-11-06 PROCEDURE — G8427 DOCREV CUR MEDS BY ELIG CLIN: HCPCS | Performed by: PHYSICIAN ASSISTANT

## 2024-11-06 PROCEDURE — G9899 SCRN MAM PERF RSLTS DOC: HCPCS | Performed by: PHYSICIAN ASSISTANT

## 2024-11-06 PROCEDURE — 3017F COLORECTAL CA SCREEN DOC REV: CPT | Performed by: PHYSICIAN ASSISTANT

## 2024-11-06 PROCEDURE — G8484 FLU IMMUNIZE NO ADMIN: HCPCS | Performed by: PHYSICIAN ASSISTANT

## 2024-11-06 RX ORDER — KETOROLAC TROMETHAMINE 10 MG/1
10 TABLET, FILM COATED ORAL EVERY 6 HOURS PRN
Qty: 20 TABLET | Refills: 0 | Status: SHIPPED | OUTPATIENT
Start: 2024-11-06

## 2024-11-06 NOTE — PROGRESS NOTES
Heart Disease Maternal Aunt     No Known Problems Brother     No Known Problems Paternal Aunt     No Known Problems Paternal Uncle     No Known Problems Paternal Grandmother     No Known Problems Paternal Grandfather     No Known Problems Other     Rheum Arthritis Neg Hx     Osteoarthritis Neg Hx     Asthma Neg Hx     Breast Cancer Neg Hx     Heart Failure Neg Hx     Hypertension Neg Hx     Migraines Neg Hx     Ovarian Cancer Neg Hx     Rashes/Skin Problems Neg Hx     Seizures Neg Hx     Thyroid Disease Neg Hx        REVIEW OF SYSTEMS: Full ROS noted & scanned   CONSTITUTIONAL: Denies unexplained weight loss, fevers, chills or fatigue  NEUROLOGICAL: Denies unsteady gait or progressive weakness  MUSCULOSKELETAL: Denies joint swelling or redness  PSYCHOLOGICAL: Denies anxiety, depression   SKIN: Denies skin changes, delayed healing, rash, itching   HEMATOLOGIC: Denies easy bleeding or bruising  ENDOCRINE: Denies excessive thirst, urination, heat/cold  RESPIRATORY: Denies current dyspnea, cough  GI: Denies nausea, vomiting, diarrhea   : Denies bowel or bladder issues      PHYSICAL EXAM:    Vitals: Height 1.702 m (5' 7.01\"), weight 94.3 kg (208 lb), last menstrual period 07/11/2021, not currently breastfeeding.    GENERAL EXAM:  General Apparence: Patient is adequately groomed with no evidence of malnutrition.  Orientation: The patient is oriented to time, place and person.   Mood & Affect:The patient's mood and affect are appropriate.  Vascular: Examination reveals no swelling tenderness in upper or lower extremities. Good capillary refill.  Lymphatic: The lymphatic examination bilaterally reveals all areas to be without enlargement or induration  Sensation: Sensation is intact without deficit  Coordination/Balance: Good coordination.     LUMBAR/SACRAL EXAMINATION:  Inspection: Local inspection shows no step-off or bruising.  Lumbar alignment is normal.  Sagittal and Coronal balance is neutral.      Palpation:

## 2024-11-13 ENCOUNTER — TELEPHONE (OUTPATIENT)
Dept: ORTHOPEDIC SURGERY | Age: 59
End: 2024-11-13

## 2024-11-13 DIAGNOSIS — M48.061 SPINAL STENOSIS OF LUMBAR REGION, UNSPECIFIED WHETHER NEUROGENIC CLAUDICATION PRESENT: ICD-10-CM

## 2024-11-13 DIAGNOSIS — M51.26 HNP (HERNIATED NUCLEUS PULPOSUS), LUMBAR: Primary | ICD-10-CM

## 2024-11-13 RX ORDER — LORAZEPAM 1 MG/1
TABLET ORAL
Qty: 2 TABLET | Refills: 0 | Status: SHIPPED | OUTPATIENT
Start: 2024-11-13 | End: 2024-11-14

## 2024-11-13 NOTE — TELEPHONE ENCOUNTER
General Question     Subject: MRI APPROVAL UPDATE  Patient and /or Facility Request: Tosin Pa   Contact Number: [phone]       PATIENT CALLING REGARDING THE APPROVAL OF HER MRI.    ALSO WANTS TO NOTE THAT SHE IS CLAUSTROPHOBIC AND THAT SHE NEEDS MEDICATION BEFORE HER MRI

## 2024-11-23 DIAGNOSIS — E11.21 TYPE 2 DIABETES MELLITUS WITH DIABETIC NEPHROPATHY, WITH LONG-TERM CURRENT USE OF INSULIN (HCC): ICD-10-CM

## 2024-11-23 DIAGNOSIS — E11.3299 TYPE 2 DIABETES MELLITUS WITH BACKGROUND RETINOPATHY (HCC): ICD-10-CM

## 2024-11-23 DIAGNOSIS — Z79.4 TYPE 2 DIABETES MELLITUS WITH DIABETIC NEPHROPATHY, WITH LONG-TERM CURRENT USE OF INSULIN (HCC): ICD-10-CM

## 2024-11-23 DIAGNOSIS — I10 ESSENTIAL HYPERTENSION: ICD-10-CM

## 2024-11-23 DIAGNOSIS — E55.9 VITAMIN D DEFICIENCY: ICD-10-CM

## 2024-11-24 LAB
EST. AVERAGE GLUCOSE BLD GHB EST-MCNC: 157.1 MG/DL
HBA1C MFR BLD: 7.1 %

## 2024-11-25 ENCOUNTER — OFFICE VISIT (OUTPATIENT)
Dept: ENDOCRINOLOGY | Age: 59
End: 2024-11-25
Payer: COMMERCIAL

## 2024-11-25 VITALS
DIASTOLIC BLOOD PRESSURE: 68 MMHG | HEIGHT: 67 IN | WEIGHT: 207.8 LBS | SYSTOLIC BLOOD PRESSURE: 110 MMHG | BODY MASS INDEX: 32.62 KG/M2 | HEART RATE: 94 BPM | OXYGEN SATURATION: 100 %

## 2024-11-25 DIAGNOSIS — Z79.4 TYPE 2 DIABETES MELLITUS WITH DIABETIC NEPHROPATHY, WITH LONG-TERM CURRENT USE OF INSULIN (HCC): ICD-10-CM

## 2024-11-25 DIAGNOSIS — E11.3299 TYPE 2 DIABETES MELLITUS WITH BACKGROUND RETINOPATHY (HCC): ICD-10-CM

## 2024-11-25 DIAGNOSIS — I10 ESSENTIAL HYPERTENSION: ICD-10-CM

## 2024-11-25 DIAGNOSIS — Z79.4 TYPE 2 DIABETES MELLITUS WITHOUT COMPLICATION, WITH LONG-TERM CURRENT USE OF INSULIN (HCC): Primary | ICD-10-CM

## 2024-11-25 DIAGNOSIS — E11.21 TYPE 2 DIABETES MELLITUS WITH DIABETIC NEPHROPATHY, WITH LONG-TERM CURRENT USE OF INSULIN (HCC): ICD-10-CM

## 2024-11-25 DIAGNOSIS — E11.9 TYPE 2 DIABETES MELLITUS WITHOUT COMPLICATION, WITH LONG-TERM CURRENT USE OF INSULIN (HCC): Primary | ICD-10-CM

## 2024-11-25 PROCEDURE — 99214 OFFICE O/P EST MOD 30 MIN: CPT | Performed by: INTERNAL MEDICINE

## 2024-11-25 PROCEDURE — G8417 CALC BMI ABV UP PARAM F/U: HCPCS | Performed by: INTERNAL MEDICINE

## 2024-11-25 PROCEDURE — 3017F COLORECTAL CA SCREEN DOC REV: CPT | Performed by: INTERNAL MEDICINE

## 2024-11-25 PROCEDURE — 3078F DIAST BP <80 MM HG: CPT | Performed by: INTERNAL MEDICINE

## 2024-11-25 PROCEDURE — G8484 FLU IMMUNIZE NO ADMIN: HCPCS | Performed by: INTERNAL MEDICINE

## 2024-11-25 PROCEDURE — 3051F HG A1C>EQUAL 7.0%<8.0%: CPT | Performed by: INTERNAL MEDICINE

## 2024-11-25 PROCEDURE — G9899 SCRN MAM PERF RSLTS DOC: HCPCS | Performed by: INTERNAL MEDICINE

## 2024-11-25 PROCEDURE — 1036F TOBACCO NON-USER: CPT | Performed by: INTERNAL MEDICINE

## 2024-11-25 PROCEDURE — 2022F DILAT RTA XM EVC RTNOPTHY: CPT | Performed by: INTERNAL MEDICINE

## 2024-11-25 PROCEDURE — G8427 DOCREV CUR MEDS BY ELIG CLIN: HCPCS | Performed by: INTERNAL MEDICINE

## 2024-11-25 PROCEDURE — 3074F SYST BP LT 130 MM HG: CPT | Performed by: INTERNAL MEDICINE

## 2024-11-25 RX ORDER — ASPIRIN 81 MG
TABLET,CHEWABLE ORAL
COMMUNITY
Start: 2024-11-12

## 2024-11-25 RX ORDER — CYCLOBENZAPRINE HCL 5 MG
TABLET ORAL
COMMUNITY
Start: 2024-08-20

## 2024-11-25 RX ORDER — HYDROCHLOROTHIAZIDE 12.5 MG/1
12.5 TABLET ORAL DAILY
Qty: 90 TABLET | Refills: 1 | Status: SHIPPED | OUTPATIENT
Start: 2024-11-25

## 2024-11-25 RX ORDER — MUPIROCIN 20 MG/G
OINTMENT TOPICAL
COMMUNITY
Start: 2024-11-12

## 2024-11-25 RX ORDER — LETROZOLE 2.5 MG/1
2.5 TABLET, FILM COATED ORAL DAILY
COMMUNITY
Start: 2024-07-16

## 2024-11-25 RX ORDER — NYSTATIN TOPICAL POWDER 100000 U/G
POWDER TOPICAL 2 TIMES DAILY
COMMUNITY
Start: 2024-11-12

## 2024-11-25 NOTE — PROGRESS NOTES
instructions provided in written.   Check Blood sugars 3 times per day. Log them along with insulin and send them every 2 weeks. Call for blood sugars less than 60 or more than 400.     Eye exam: Last exam in June 2024, Non PDR without ME, stable.    Foot exam:  Oct 2023. dystrophic nail with fungal infection of toes, extending to skin on left foot   Deformity/amputation: absent  Skin lesions/pre-ulcerative calluses: absent  Edema: right- negative, left- negative  Sensory exam: Monofilament sensation: normal  Pulses: normal, Vibration (128 Hz): mildly impaired     Renal screen: Normal Jan 2024     TSH screen: Sep 2022      2: HTN   Low normal, asymptomatic   C/w HCTZ 12.5 mg daily   Keep spironolactone for hirsutism     3: Hyperlipidemia    < 107 < 65 , HDL 62, Tgs 88 - Jan 2024     She stopped statins due to muscle cramps. She could not not tolerate Crestor 10mg, every other day.   She is thinking to restart it - Jan 2024     Vit D Def: 37 - Jan 2024    C/w ergocalciferol 50k units once weekly     If Vit D drops close to 30 change to twice a week     4: Hirsutism, since 20's   On face only   Runs in the family   Gonadotropins suggest pre menopause status   Free T high 5.8 (N 0.6-3.8), TT normal 24 July 2018   Free T 4.0 high June 2020.   TT 15, free T 2.9 - Jan 2024   Spironolactone 50mg twice daily, this dose is helping      5: Morbid obesity   Calorie target <1300 per day   Need to work on diet, exercise and lose weight   Lifestyle changes failed for meaningful weight loss   Belviq 10mg bid , was stared in May 2019 - stopped due to recall        RTC in 4 months with A1C, CMP, lipids, MACR, Vit D     Patient is aware that refills are on the visit     EDUCATION:   Greater than 50% of this follow-up visit was spent in general counseling regarding   obesity, diet, exercise, importance of adherence to insulin regime, recognition and treatment of hypo and hyperglycemia,  glucose logging, proper diabetes

## 2024-12-03 ENCOUNTER — OFFICE VISIT (OUTPATIENT)
Dept: ORTHOPEDIC SURGERY | Age: 59
End: 2024-12-03
Payer: COMMERCIAL

## 2024-12-03 VITALS — HEIGHT: 67 IN | WEIGHT: 207 LBS | BODY MASS INDEX: 32.49 KG/M2

## 2024-12-03 DIAGNOSIS — M54.41 CHRONIC BILATERAL LOW BACK PAIN WITH BILATERAL SCIATICA: ICD-10-CM

## 2024-12-03 DIAGNOSIS — M54.42 CHRONIC BILATERAL LOW BACK PAIN WITH BILATERAL SCIATICA: ICD-10-CM

## 2024-12-03 DIAGNOSIS — G89.29 CHRONIC BILATERAL LOW BACK PAIN WITH BILATERAL SCIATICA: ICD-10-CM

## 2024-12-03 DIAGNOSIS — M48.062 LUMBAR STENOSIS WITH NEUROGENIC CLAUDICATION: ICD-10-CM

## 2024-12-03 DIAGNOSIS — M51.361 DEGENERATION OF INTERVERTEBRAL DISC OF LUMBAR REGION WITH LOWER EXTREMITY PAIN: ICD-10-CM

## 2024-12-03 DIAGNOSIS — M51.26 HNP (HERNIATED NUCLEUS PULPOSUS), LUMBAR: Primary | ICD-10-CM

## 2024-12-03 PROCEDURE — G8427 DOCREV CUR MEDS BY ELIG CLIN: HCPCS | Performed by: PHYSICIAN ASSISTANT

## 2024-12-03 PROCEDURE — 1036F TOBACCO NON-USER: CPT | Performed by: PHYSICIAN ASSISTANT

## 2024-12-03 PROCEDURE — 99214 OFFICE O/P EST MOD 30 MIN: CPT | Performed by: PHYSICIAN ASSISTANT

## 2024-12-03 PROCEDURE — G8417 CALC BMI ABV UP PARAM F/U: HCPCS | Performed by: PHYSICIAN ASSISTANT

## 2024-12-03 PROCEDURE — G8484 FLU IMMUNIZE NO ADMIN: HCPCS | Performed by: PHYSICIAN ASSISTANT

## 2024-12-03 PROCEDURE — 3017F COLORECTAL CA SCREEN DOC REV: CPT | Performed by: PHYSICIAN ASSISTANT

## 2024-12-03 PROCEDURE — G9899 SCRN MAM PERF RSLTS DOC: HCPCS | Performed by: PHYSICIAN ASSISTANT

## 2024-12-03 RX ORDER — METHYLPREDNISOLONE 4 MG/1
TABLET ORAL
Qty: 1 KIT | Refills: 0 | Status: SHIPPED | OUTPATIENT
Start: 2024-12-03

## 2024-12-03 NOTE — PROGRESS NOTES
transforaminal ASHLEY #1.  Procedure risk and benefits discussed.  Pamphlet provided for more information.  Will order with AISHA at [facility]  Surgical consult for MLD--referral placed to Dr. Velazquez and also Dr. Molina  Start PT  MDP--to hold onto if severe flareup.  Side effect/risk discussed  Discussed concerning signs and symptoms  She will discuss renal cyst with PCP  Follow-up 2 weeks after ASHLEY        Nunu Cagle PA-C, MPAS  Board Certified by the [facility]

## 2024-12-09 ENCOUNTER — HOSPITAL ENCOUNTER (OUTPATIENT)
Dept: INTERVENTIONAL RADIOLOGY/VASCULAR | Age: 59
Discharge: HOME | End: 2024-12-11
Payer: COMMERCIAL

## 2024-12-09 DIAGNOSIS — M51.26 HERNIATED LUMBAR INTERVERTEBRAL DISC: ICD-10-CM

## 2024-12-09 PROCEDURE — 2709999900 HC NON-CHARGEABLE SUPPLY

## 2024-12-09 PROCEDURE — 6360000004 HC RX CONTRAST MEDICATION: Performed by: STUDENT IN AN ORGANIZED HEALTH CARE EDUCATION/TRAINING PROGRAM

## 2024-12-09 PROCEDURE — 64484 NJX AA&/STRD TFRM EPI L/S EA: CPT

## 2024-12-09 PROCEDURE — 6360000002 HC RX W HCPCS: Performed by: STUDENT IN AN ORGANIZED HEALTH CARE EDUCATION/TRAINING PROGRAM

## 2024-12-09 RX ORDER — BUPIVACAINE HYDROCHLORIDE 2.5 MG/ML
INJECTION, SOLUTION EPIDURAL; INFILTRATION; INTRACAUDAL PRN
Status: COMPLETED | OUTPATIENT
Start: 2024-12-09 | End: 2024-12-09

## 2024-12-09 RX ORDER — IOPAMIDOL 408 MG/ML
INJECTION, SOLUTION INTRATHECAL PRN
Status: COMPLETED | OUTPATIENT
Start: 2024-12-09 | End: 2024-12-09

## 2024-12-09 RX ORDER — LIDOCAINE HYDROCHLORIDE 10 MG/ML
INJECTION, SOLUTION EPIDURAL; INFILTRATION; INTRACAUDAL; PERINEURAL PRN
Status: COMPLETED | OUTPATIENT
Start: 2024-12-09 | End: 2024-12-09

## 2024-12-09 RX ORDER — BETAMETHASONE SODIUM PHOSPHATE AND BETAMETHASONE ACETATE 3; 3 MG/ML; MG/ML
INJECTION, SUSPENSION INTRA-ARTICULAR; INTRALESIONAL; INTRAMUSCULAR; SOFT TISSUE PRN
Status: COMPLETED | OUTPATIENT
Start: 2024-12-09 | End: 2024-12-09

## 2024-12-09 RX ADMIN — BUPIVACAINE HYDROCHLORIDE 5 ML: 2.5 INJECTION, SOLUTION EPIDURAL; INFILTRATION; INTRACAUDAL at 13:28

## 2024-12-09 RX ADMIN — LIDOCAINE HYDROCHLORIDE 5 ML: 10 INJECTION, SOLUTION EPIDURAL; INFILTRATION; INTRACAUDAL; PERINEURAL at 13:28

## 2024-12-09 RX ADMIN — IOPAMIDOL 1 ML: 408 INJECTION, SOLUTION INTRATHECAL at 13:28

## 2024-12-09 RX ADMIN — BETAMETHASONE SODIUM PHOSPHATE AND BETAMETHASONE ACETATE 12 MG: 3; 3 INJECTION, SUSPENSION INTRA-ARTICULAR; INTRALESIONAL; INTRAMUSCULAR at 13:28

## 2024-12-10 ENCOUNTER — TELEPHONE (OUTPATIENT)
Dept: ORTHOPEDIC SURGERY | Age: 59
End: 2024-12-10

## 2024-12-10 NOTE — TELEPHONE ENCOUNTER
Contacted the patient regarding status of their procedure. Per our records, their LESI was completed on Monday 12/9/2024 at [facility] with AISHA. The patient was informed that a 2 week follow up appointment would need to be scheduled with Nuun Cagle PA-C for re-evaluation. The patient was instructed to call back & schedule their appointment at their convenience.         
15-Dec-2022 11:03

## 2024-12-17 ENCOUNTER — TELEPHONE (OUTPATIENT)
Dept: ORTHOPEDIC SURGERY | Age: 59
End: 2024-12-17

## 2024-12-17 NOTE — TELEPHONE ENCOUNTER
L/M for the patient informing her I was returning her call regarding her request for LESI follow up appointment. She was instructed to call me back at her convenience.

## 2024-12-17 NOTE — TELEPHONE ENCOUNTER
Other PATIENT CALLING STATES THAT BRANDIE RAVI WANTS HER TO COME FOR A TWO WEEK FU FOR HER EPIDURAL SHOT SHE FORGOT TO MAKE THE APPOINTMENT THAT DAY AND SAYS DR RAVI SAID ITS IMPORTANT BUT THERE ARE NO APPOINTMENTS AVAILABLE WHEN SHE CALLED TODAY. PLEASE CALL PATIENT BACK TO SCHEDULE    [phone]

## 2024-12-19 ENCOUNTER — TELEPHONE (OUTPATIENT)
Dept: ORTHOPEDIC SURGERY | Age: 59
End: 2024-12-19

## 2024-12-19 NOTE — TELEPHONE ENCOUNTER
Other PATIENT IS RETURNING PHONE CALL TO BE SEEN FOR FOLLOW UP AFTER ASHLEY INJECTION.  [phone]

## 2024-12-20 NOTE — TELEPHONE ENCOUNTER
S/W the patient informing her I was calling to let her know I had a cancellation on 12/23 at 10AM at our [place] office if she would like to have that appointment and she states yes. I scheduled the patient for Monday 12/23/2024 at 10AM at the [place] office with Nunu. She voiced understanding.

## 2024-12-20 NOTE — TELEPHONE ENCOUNTER
Contacted the patient regarding status of their procedure. Patient informed me that their LESI was completed on Monday 12/9/2024 at [facility] with AISHA. The patient was informed that a 2 week follow up appointment would need to be scheduled with Nunu Cagle PA-C for re-evaluation. The patient is scheduled for Tuesday 12/31/2024 at 9AM at the [place] office.     Patient may call to cancel/reschedule their appointment if needed.

## 2024-12-23 ENCOUNTER — OFFICE VISIT (OUTPATIENT)
Dept: ORTHOPEDIC SURGERY | Age: 59
End: 2024-12-23
Payer: COMMERCIAL

## 2024-12-23 VITALS — BODY MASS INDEX: 32.18 KG/M2 | HEIGHT: 67 IN | WEIGHT: 205 LBS

## 2024-12-23 DIAGNOSIS — M54.41 CHRONIC BILATERAL LOW BACK PAIN WITH BILATERAL SCIATICA: ICD-10-CM

## 2024-12-23 DIAGNOSIS — M48.062 LUMBAR STENOSIS WITH NEUROGENIC CLAUDICATION: ICD-10-CM

## 2024-12-23 DIAGNOSIS — M51.26 HNP (HERNIATED NUCLEUS PULPOSUS), LUMBAR: Primary | ICD-10-CM

## 2024-12-23 DIAGNOSIS — G89.29 CHRONIC BILATERAL LOW BACK PAIN WITH BILATERAL SCIATICA: ICD-10-CM

## 2024-12-23 DIAGNOSIS — M54.42 CHRONIC BILATERAL LOW BACK PAIN WITH BILATERAL SCIATICA: ICD-10-CM

## 2024-12-23 PROCEDURE — 1036F TOBACCO NON-USER: CPT | Performed by: PHYSICIAN ASSISTANT

## 2024-12-23 PROCEDURE — G9899 SCRN MAM PERF RSLTS DOC: HCPCS | Performed by: PHYSICIAN ASSISTANT

## 2024-12-23 PROCEDURE — G8484 FLU IMMUNIZE NO ADMIN: HCPCS | Performed by: PHYSICIAN ASSISTANT

## 2024-12-23 PROCEDURE — G8427 DOCREV CUR MEDS BY ELIG CLIN: HCPCS | Performed by: PHYSICIAN ASSISTANT

## 2024-12-23 PROCEDURE — G8417 CALC BMI ABV UP PARAM F/U: HCPCS | Performed by: PHYSICIAN ASSISTANT

## 2024-12-23 PROCEDURE — 99213 OFFICE O/P EST LOW 20 MIN: CPT | Performed by: PHYSICIAN ASSISTANT

## 2024-12-23 PROCEDURE — 3017F COLORECTAL CA SCREEN DOC REV: CPT | Performed by: PHYSICIAN ASSISTANT

## 2024-12-23 NOTE — PROGRESS NOTES
Follow up: LUMBAR SPINE    CHIEF COMPLAINT:    Chief Complaint   Patient presents with    Back Pain     F/U LUMBAR PAIN POST INJECTION       HISTORY OF PRESENT ILLNESS:       Ms. Tosin Pa  is a pleasant 59 y.o. female, h/o DM, breast cancer, ASA tx, here to follow-up after bilateral L4 transforaminal ASHLEY #1 12/9/2024 for chronic worsening low back and radiating leg pain.  She reports a history of chronic underlying aching low back pain radiating into the left greater than right posterior lateral thigh/calf to the feet.  At times she will also have left anterior thigh pain.  She reports chronic numbness and tingling in her feet and reports a history of neuropathy following breast cancer treatment.  Recent lumbar MRI has shown a large L4-5 central HNP with severe central stenosis.  Her symptoms have been more severe over the last few months.  Her pain is increased with any walking or standing.  She reports some relief with sitting and resting.  She currently reports 60% improvement with some improved activity level since the procedure.  Other conservative care includes evaluation at the ED, Robaxin, oxycodone, ibuprofen, IM Toradol, Lyrica, Cymbalta, MDP.  She has a surgical consultation with Dr. Velazquez 1/8/2025.  She denies any progressive extremity weakness.  Denies any recent bowel or bladder dysfunction or saddle anesthesia.  Denies any recent injury--states she did fall from a chair back in August.  Denies any recent fevers or infections.  She denies any significant side effects of the procedure aside from pain.    Current/Past Treatment:   Physical Therapy: HEP  Chiropractic:     Injection: IM Toradol  12/9/2024 bilateral L4 transforaminal epidural injection - Clovis Barragan MD ([facility])--60-70%  Medications:            NSAIDS: Ibuprofen            Muscle relaxer: Robaxin            Steriods: Possibly IM, no PO, MDP            Neuropathic medications:  Lyrica, Cymbalta             Opioids:

## 2025-01-08 ENCOUNTER — TELEPHONE (OUTPATIENT)
Dept: ORTHOPEDIC SURGERY | Age: 60
End: 2025-01-08

## 2025-01-08 ENCOUNTER — OFFICE VISIT (OUTPATIENT)
Dept: ORTHOPEDIC SURGERY | Age: 60
End: 2025-01-08
Payer: COMMERCIAL

## 2025-01-08 VITALS — WEIGHT: 205 LBS | HEIGHT: 67 IN | BODY MASS INDEX: 32.18 KG/M2

## 2025-01-08 DIAGNOSIS — M51.16 LUMBAR DISC HERNIATION WITH RADICULOPATHY: Primary | ICD-10-CM

## 2025-01-08 PROCEDURE — 3017F COLORECTAL CA SCREEN DOC REV: CPT | Performed by: ORTHOPAEDIC SURGERY

## 2025-01-08 PROCEDURE — G8417 CALC BMI ABV UP PARAM F/U: HCPCS | Performed by: ORTHOPAEDIC SURGERY

## 2025-01-08 PROCEDURE — M1308 PR FLU IMMUNIZE NO ADMIN: HCPCS | Performed by: ORTHOPAEDIC SURGERY

## 2025-01-08 PROCEDURE — 99214 OFFICE O/P EST MOD 30 MIN: CPT | Performed by: ORTHOPAEDIC SURGERY

## 2025-01-08 PROCEDURE — 1036F TOBACCO NON-USER: CPT | Performed by: ORTHOPAEDIC SURGERY

## 2025-01-08 PROCEDURE — G8427 DOCREV CUR MEDS BY ELIG CLIN: HCPCS | Performed by: ORTHOPAEDIC SURGERY

## 2025-01-08 PROCEDURE — G9899 SCRN MAM PERF RSLTS DOC: HCPCS | Performed by: ORTHOPAEDIC SURGERY

## 2025-01-08 SDOH — HEALTH STABILITY: PHYSICAL HEALTH: ON AVERAGE, HOW MANY MINUTES DO YOU ENGAGE IN EXERCISE AT THIS LEVEL?: 30 MIN

## 2025-01-08 SDOH — HEALTH STABILITY: PHYSICAL HEALTH: ON AVERAGE, HOW MANY DAYS PER WEEK DO YOU ENGAGE IN MODERATE TO STRENUOUS EXERCISE (LIKE A BRISK WALK)?: 2 DAYS

## 2025-01-08 NOTE — TELEPHONE ENCOUNTER
General Question     Subject: EPIDURAL REFERRAL   Patient and /or Facility Request: Tosin Pa   Contact Number: [phone]     PATIENT REQUESTING REFERRAL TO BE SENT OVER FOR HER EPIDURAL INJECTION    PLEASE ADVISE

## 2025-01-08 NOTE — PROGRESS NOTES
COVID     Essential hypertension 4/16/2018    Fibroid     Fibroids     uterine    Invasive lobular carcinoma of breast in female (HCC) 4/21/2023    Iron deficiency anemia 11/22/2020    Normal colonoscopy, mennorhagia    Iron deficiency anemia 11/22/2020    Rupture of biceps tendon 6/19/2017    Type II or unspecified type diabetes mellitus without mention of complication, not stated as uncontrolled     Urogenital trichomoniasis         Past Surgical History:     Past Surgical History:   Procedure Laterality Date    CATARACT REMOVAL WITH IMPLANT      COLONOSCOPY  5/23/16    incomplete, Dr Martin    WISDOM TOOTH EXTRACTION         Current Medications:     Current Outpatient Medications:     methylPREDNISolone (MEDROL, GAYATRI,) 4 MG tablet, Take by mouth., Disp: 1 kit, Rfl: 0    Capsaicin 0.1 % CREA, APPLY THIN FILM TOPICALLY EVERY 8 HOURS AS NEEDED FOR PAIN., Disp: , Rfl:     cyclobenzaprine (FLEXERIL) 5 MG tablet, TAKE 1 TABLET (5 MG TOTAL) BY MOUTH 3 TIMES A DAY AS NEEDED FOR MUSCLE SPASMS FOR UP TO 21 DAYS., Disp: , Rfl:     letrozole (FEMARA) 2.5 MG tablet, Take 1 tablet by mouth daily, Disp: , Rfl:     mupirocin (BACTROBAN) 2 % ointment, APPLY TO AFFECTED AREA 3 TIMES A DAY, Disp: , Rfl:     KLAYESTA [id number] UNIT/GM powder, Apply topically 2 times daily APPLY TO AFFECTED AREA, Disp: , Rfl:     hydroCHLOROthiazide 12.5 MG tablet, Take 1 tablet by mouth daily, Disp: 90 tablet, Rfl: 1    ketorolac (TORADOL) 10 MG tablet, Take 1 tablet by mouth every 6 hours as needed for Pain, Disp: 20 tablet, Rfl: 0    JARDIANCE 25 MG tablet, TAKE 1/2 TABLET BY MOUTH DAILY, Disp: 15 tablet, Rfl: 2    Tirzepatide (MOUNJARO) 7.5 MG/0.5ML SOPN SC injection, INJECT 0.5 ML SUBCUTANEOUSLY ONE TIME PER WEEK, Disp: 2 mL, Rfl: 5    pregabalin (LYRICA) 25 MG capsule, Take 1 capsule by mouth 2 times daily for 180 days. Max Daily Amount: 50 mg, Disp: 60 capsule, Rfl: 5    spironolactone (ALDACTONE) 50 MG tablet, TAKE 1 TABLET BY MOUTH TWICE A

## 2025-01-14 ENCOUNTER — HOSPITAL ENCOUNTER (OUTPATIENT)
Dept: PHYSICAL THERAPY | Age: 60
Setting detail: THERAPIES SERIES
Discharge: HOME | End: 2025-01-14
Payer: COMMERCIAL

## 2025-01-14 DIAGNOSIS — M54.41 CHRONIC BILATERAL LOW BACK PAIN WITH BILATERAL SCIATICA: Primary | ICD-10-CM

## 2025-01-14 DIAGNOSIS — G89.29 CHRONIC BILATERAL LOW BACK PAIN WITH BILATERAL SCIATICA: Primary | ICD-10-CM

## 2025-01-14 DIAGNOSIS — M54.42 CHRONIC BILATERAL LOW BACK PAIN WITH BILATERAL SCIATICA: Primary | ICD-10-CM

## 2025-01-14 PROCEDURE — 97110 THERAPEUTIC EXERCISES: CPT

## 2025-01-14 PROCEDURE — 97162 PT EVAL MOD COMPLEX 30 MIN: CPT

## 2025-01-14 NOTE — PLAN OF CARE
(53939) including: functional mobility training and education.  Neuromuscular Re-education (47609) activation and proprioception, including postural re-education.    Manual Therapy (66527) as indicated to include: Passive Range of Motion, Gr I-IV mobilizations, and Trigger Point Release  Modalities as needed that may include: Cryotherapy, Electrical Stimulation, and Thermal Agents  Patient education on postural re-education, activity modification, and progression of HEP    Plan: POC initiated as per evaluation    Electronically Signed by María Bay, ERIC  Date: 01/14/2025     Note: Portions of this note have been templated and/or copied from initial evaluation, reassessments and prior notes for documentation efficiency.    Note: If patient does not return for scheduled/recommended follow up visits, this note will serve as a discharge from care along with the most recent update on progress.    Ortho Evaluation

## 2025-01-15 ENCOUNTER — HOSPITAL ENCOUNTER (OUTPATIENT)
Dept: INTERVENTIONAL RADIOLOGY/VASCULAR | Age: 60
Discharge: HOME | End: 2025-01-17
Payer: COMMERCIAL

## 2025-01-15 DIAGNOSIS — M51.26 HERNIATED LUMBAR INTERVERTEBRAL DISC: ICD-10-CM

## 2025-01-15 PROCEDURE — 6360000002 HC RX W HCPCS: Performed by: STUDENT IN AN ORGANIZED HEALTH CARE EDUCATION/TRAINING PROGRAM

## 2025-01-15 PROCEDURE — 64483 NJX AA&/STRD TFRM EPI L/S 1: CPT

## 2025-01-15 PROCEDURE — 6360000004 HC RX CONTRAST MEDICATION: Performed by: STUDENT IN AN ORGANIZED HEALTH CARE EDUCATION/TRAINING PROGRAM

## 2025-01-15 PROCEDURE — 2709999900 HC NON-CHARGEABLE SUPPLY

## 2025-01-15 RX ORDER — LIDOCAINE HYDROCHLORIDE 10 MG/ML
INJECTION, SOLUTION EPIDURAL; INFILTRATION; INTRACAUDAL; PERINEURAL PRN
Status: COMPLETED | OUTPATIENT
Start: 2025-01-15 | End: 2025-01-15

## 2025-01-15 RX ORDER — BUPIVACAINE HYDROCHLORIDE 2.5 MG/ML
INJECTION, SOLUTION EPIDURAL; INFILTRATION; INTRACAUDAL PRN
Status: COMPLETED | OUTPATIENT
Start: 2025-01-15 | End: 2025-01-15

## 2025-01-15 RX ORDER — BETAMETHASONE SODIUM PHOSPHATE AND BETAMETHASONE ACETATE 3; 3 MG/ML; MG/ML
INJECTION, SUSPENSION INTRA-ARTICULAR; INTRALESIONAL; INTRAMUSCULAR; SOFT TISSUE PRN
Status: COMPLETED | OUTPATIENT
Start: 2025-01-15 | End: 2025-01-15

## 2025-01-15 RX ADMIN — BUPIVACAINE HYDROCHLORIDE 1 ML: 2.5 INJECTION, SOLUTION EPIDURAL; INFILTRATION; INTRACAUDAL at 13:18

## 2025-01-15 RX ADMIN — IOHEXOL 1 ML: 180 INJECTION INTRAVENOUS at 13:16

## 2025-01-15 RX ADMIN — BETAMETHASONE SODIUM PHOSPHATE AND BETAMETHASONE ACETATE 12 MG: 3; 3 INJECTION, SUSPENSION INTRA-ARTICULAR; INTRALESIONAL; INTRAMUSCULAR at 13:18

## 2025-01-15 RX ADMIN — LIDOCAINE HYDROCHLORIDE 5 ML: 10 INJECTION, SOLUTION EPIDURAL; INFILTRATION; INTRACAUDAL; PERINEURAL at 13:17

## 2025-01-15 NOTE — PROCEDURES
Interventional Radiology Post Procedure    Date: 1/15/2025    Physician: Kunal Barragan MD    Pre-op Diagnosis: low back pain with radiculopathy    Post-op Diagnosis: same    Variation from Planned Procedure: None       Findings: successful bilateral L4 transforaminal ASHLEY. Pain 6 -> 2    Patient condition: Stable    Estimated Blood Loss: 1 cc    Specimens:  none      Signed,  Clovis Barragan MD  1:26 PM  1/15/2025

## 2025-01-16 ENCOUNTER — TELEPHONE (OUTPATIENT)
Dept: ORTHOPEDIC SURGERY | Age: 60
End: 2025-01-16

## 2025-01-16 NOTE — TELEPHONE ENCOUNTER
Contacted the patient regarding status of their procedure. Patient informed me that their LESI #2 was completed on Wednesday 1/15/2025 at [facility] with AISHA. The patient was informed that a 2 week follow up appointment would need to be scheduled with Nunu Cagle PA-C for re-evaluation. The patient has elected to call back and schedule her follow up appointment at her convenience.

## 2025-01-25 DIAGNOSIS — Z79.4 TYPE 2 DIABETES MELLITUS WITHOUT COMPLICATION, WITH LONG-TERM CURRENT USE OF INSULIN (HCC): ICD-10-CM

## 2025-01-25 DIAGNOSIS — Z79.4 TYPE 2 DIABETES MELLITUS WITH DIABETIC NEPHROPATHY, WITH LONG-TERM CURRENT USE OF INSULIN (HCC): ICD-10-CM

## 2025-01-25 DIAGNOSIS — E11.21 TYPE 2 DIABETES MELLITUS WITH DIABETIC NEPHROPATHY, WITH LONG-TERM CURRENT USE OF INSULIN (HCC): ICD-10-CM

## 2025-01-25 DIAGNOSIS — E11.9 TYPE 2 DIABETES MELLITUS WITHOUT COMPLICATION, WITH LONG-TERM CURRENT USE OF INSULIN (HCC): ICD-10-CM

## 2025-01-25 DIAGNOSIS — E11.3299 TYPE 2 DIABETES MELLITUS WITH BACKGROUND RETINOPATHY (HCC): ICD-10-CM

## 2025-01-26 DIAGNOSIS — L68.0 HIRSUTISM: ICD-10-CM

## 2025-01-27 RX ORDER — EMPAGLIFLOZIN 25 MG/1
12.5 TABLET, FILM COATED ORAL DAILY
Qty: 15 TABLET | Refills: 2 | Status: SHIPPED | OUTPATIENT
Start: 2025-01-27

## 2025-01-27 RX ORDER — SPIRONOLACTONE 50 MG/1
TABLET, FILM COATED ORAL
Qty: 60 TABLET | Refills: 3 | Status: SHIPPED | OUTPATIENT
Start: 2025-01-27

## 2025-01-27 NOTE — TELEPHONE ENCOUNTER
Requested Prescriptions     Pending Prescriptions Disp Refills    JARDIANCE 25 MG tablet [Pharmacy Med Name: JARDIANCE 25 MG TABLET] 15 tablet 2     Sig: TAKE 1/2 TABLET BY MOUTH DAILY     Last refilled: 11/1/2024 # 15 with 2 refills    Lov:11/25/2024- Patient is aware that refills are on the visit   Nov: 3/17/2025

## 2025-01-27 NOTE — TELEPHONE ENCOUNTER
Requested Prescriptions     Pending Prescriptions Disp Refills    spironolactone (ALDACTONE) 50 MG tablet [Pharmacy Med Name: SPIRONOLACTONE 50 MG TABLET] 60 tablet 11     Sig: TAKE 1 TABLET BY MOUTH TWICE A DAY     Last refilled: 1/11/2024 # 60 with 11 refills     Lov: 11/25/2024  Nov:3/17/2025

## 2025-01-29 ENCOUNTER — HOSPITAL ENCOUNTER (OUTPATIENT)
Dept: PHYSICAL THERAPY | Age: 60
Setting detail: THERAPIES SERIES
Discharge: HOME | End: 2025-01-29
Payer: COMMERCIAL

## 2025-01-29 PROCEDURE — 97110 THERAPEUTIC EXERCISES: CPT

## 2025-01-29 PROCEDURE — 97140 MANUAL THERAPY 1/> REGIONS: CPT

## 2025-01-29 NOTE — FLOWSHEET NOTE
found upon examination of body systems using standardized tests and measures addressing any of the following: body structures, functions (impairments), activity limitations, and/or participation restrictions  [x] A clinical presentation with stable and/or uncomplicated characteristics   [x] Clinical decision making of MODERATE (06400 - Typically 30 minutes face-to-face) complexity using standardized patient assessment instrument and/or measurable assessment of functional outcome.    Today's Assessment: Patient responds well to traction and prone press ups. Added prone press up to HEP. She demonstrated good core control with TA march.   See above    Medical Necessity Documentation:  I certify that this patient meets the below criteria necessary for medical necessity for care and/or justification of therapy services:  The patient has functional impairments and/or activity limitations and would benefit from continued outpatient therapy services to address the deficits outlined in the patients goals    Return to Play: NA    Prognosis for POC: [x] Good [] Fair  [] Poor    Patient requires continued skilled intervention: [x] Yes  [] No      CHARGE CAPTURE     PT CHARGE GRID   CPT Code (TIMED) minutes # CPT Code (UNTIMED) #     Therex (88119)  30 2  EVAL:MODERATE (98299 - Typically 30 minutes face-to-face)     Neuromusc. Re-ed (87598)    Re-Eval (01356)     Manual (04881) 9 1  Estim Unattended (61202)     Ther. Act (71671)    Mech. Traction (64656)     Gait (22026)    Dry Needle 1-2 muscle (20560)     Aquatic Therex (72199)    Dry Needle 3+ muscle (20561)     Iontophoresis (34217)    VASO (65697)     Ultrasound (46041)    Group Therapy (15804)     Estim Attended (81436)    Canalith Repositioning (45521)     Physical Performance Test (29695)    Custom orthotic ()     Other:    Other:    Total Timed Code Tx Minutes 39 3       Total Treatment Minutes 50        Charge Justification:  (79449) THERAPEUTIC EXERCISE -

## 2025-02-03 ENCOUNTER — OFFICE VISIT (OUTPATIENT)
Dept: ORTHOPEDIC SURGERY | Age: 60
End: 2025-02-03
Payer: COMMERCIAL

## 2025-02-03 VITALS — BODY MASS INDEX: 32.18 KG/M2 | HEIGHT: 67 IN | WEIGHT: 205 LBS

## 2025-02-03 DIAGNOSIS — M54.41 CHRONIC BILATERAL LOW BACK PAIN WITH BILATERAL SCIATICA: ICD-10-CM

## 2025-02-03 DIAGNOSIS — M48.062 LUMBAR STENOSIS WITH NEUROGENIC CLAUDICATION: ICD-10-CM

## 2025-02-03 DIAGNOSIS — G89.29 CHRONIC BILATERAL LOW BACK PAIN WITH BILATERAL SCIATICA: ICD-10-CM

## 2025-02-03 DIAGNOSIS — M54.42 CHRONIC BILATERAL LOW BACK PAIN WITH BILATERAL SCIATICA: ICD-10-CM

## 2025-02-03 DIAGNOSIS — M51.16 LUMBAR DISC HERNIATION WITH RADICULOPATHY: Primary | ICD-10-CM

## 2025-02-03 PROCEDURE — 1036F TOBACCO NON-USER: CPT | Performed by: PHYSICIAN ASSISTANT

## 2025-02-03 PROCEDURE — 99214 OFFICE O/P EST MOD 30 MIN: CPT | Performed by: PHYSICIAN ASSISTANT

## 2025-02-03 PROCEDURE — G8427 DOCREV CUR MEDS BY ELIG CLIN: HCPCS | Performed by: PHYSICIAN ASSISTANT

## 2025-02-03 PROCEDURE — G9899 SCRN MAM PERF RSLTS DOC: HCPCS | Performed by: PHYSICIAN ASSISTANT

## 2025-02-03 PROCEDURE — G8417 CALC BMI ABV UP PARAM F/U: HCPCS | Performed by: PHYSICIAN ASSISTANT

## 2025-02-03 PROCEDURE — 3017F COLORECTAL CA SCREEN DOC REV: CPT | Performed by: PHYSICIAN ASSISTANT

## 2025-02-03 RX ORDER — PREGABALIN 75 MG/1
75 CAPSULE ORAL 2 TIMES DAILY PRN
Qty: 60 CAPSULE | Refills: 0 | Status: SHIPPED | OUTPATIENT
Start: 2025-02-03 | End: 2025-03-05

## 2025-02-03 RX ORDER — LIDOCAINE 50 MG/G
PATCH TOPICAL
Qty: 30 PATCH | Refills: 0 | Status: SHIPPED | OUTPATIENT
Start: 2025-02-03

## 2025-02-03 NOTE — PROGRESS NOTES
Follow up: LUMBAR SPINE    CHIEF COMPLAINT:    Chief Complaint   Patient presents with    Follow Up After Procedure     1/15/2025 bilateral L4 transforaminal epidural injection - Clovis Barragan MD ([facility])       HISTORY OF PRESENT ILLNESS:       Ms. Tosin Pa  is a pleasant 59 y.o. female, h/o DM, breast cancer, ASA tx, here to follow-up after bilateral L4 transforaminal ASHLEY #2 from 1-15-25 for chronic worsening low back and radiating leg pain.  She reports a history of chronic underlying aching low back pain radiating into the left > right posterior lateral thigh/calf to the feet.  At times she will also have left anterior thigh pain.  She reports chronic numbness and tingling in her feet and reports a history of neuropathy following breast cancer treatment.  Recent lumbar MRI has shown a large L4-5 central HNP with severe central stenosis.  Her symptoms have been more severe over the last few months.  Her pain is increased with any walking or standing.  She reports some relief with sitting and resting.  She currently reports 10% improvement with 2nd ASHLEY and overall 60-70% benefit from onset. She did have a surgical consult with Dr. Velazquez whom recommended second ASHLEY prior to surgery.  Other conservative care includes evaluation at the ED, Robaxin, oxycodone, ibuprofen, IM Toradol, Lyrica, Cymbalta, MDP.  She still reports residual lower back pain radiating into the left buttock and at times the anterior thigh/calf.  She denies any progressive extremity weakness.  Denies any recent bowel or bladder dysfunction or saddle anesthesia.  Denies any recent injury--states she did fall from a chair back in August.  Denies any recent fevers or infections.  She denies any significant side effects of the procedure.    Current/Past Treatment:   Physical Therapy: HEP  Chiropractic:     Injection: IM Toradol  12/9/2024 bilateral L4 transforaminal epidural injection - Clovis Barragan MD ([facility])--60-70%  1/15/2025 bilateral

## 2025-03-03 ENCOUNTER — OFFICE VISIT (OUTPATIENT)
Dept: ORTHOPEDIC SURGERY | Age: 60
End: 2025-03-03
Payer: COMMERCIAL

## 2025-03-03 VITALS — BODY MASS INDEX: 32.18 KG/M2 | HEIGHT: 67 IN | WEIGHT: 205 LBS

## 2025-03-03 DIAGNOSIS — M48.062 LUMBAR STENOSIS WITH NEUROGENIC CLAUDICATION: ICD-10-CM

## 2025-03-03 DIAGNOSIS — M51.16 LUMBAR DISC HERNIATION WITH RADICULOPATHY: ICD-10-CM

## 2025-03-03 DIAGNOSIS — M54.42 CHRONIC BILATERAL LOW BACK PAIN WITH BILATERAL SCIATICA: Primary | ICD-10-CM

## 2025-03-03 DIAGNOSIS — M54.41 CHRONIC BILATERAL LOW BACK PAIN WITH BILATERAL SCIATICA: Primary | ICD-10-CM

## 2025-03-03 DIAGNOSIS — G89.29 CHRONIC BILATERAL LOW BACK PAIN WITH BILATERAL SCIATICA: Primary | ICD-10-CM

## 2025-03-03 DIAGNOSIS — M51.26 HNP (HERNIATED NUCLEUS PULPOSUS), LUMBAR: ICD-10-CM

## 2025-03-03 PROCEDURE — 1036F TOBACCO NON-USER: CPT | Performed by: PHYSICIAN ASSISTANT

## 2025-03-03 PROCEDURE — G9899 SCRN MAM PERF RSLTS DOC: HCPCS | Performed by: PHYSICIAN ASSISTANT

## 2025-03-03 PROCEDURE — 3017F COLORECTAL CA SCREEN DOC REV: CPT | Performed by: PHYSICIAN ASSISTANT

## 2025-03-03 PROCEDURE — 99214 OFFICE O/P EST MOD 30 MIN: CPT | Performed by: PHYSICIAN ASSISTANT

## 2025-03-03 PROCEDURE — G8427 DOCREV CUR MEDS BY ELIG CLIN: HCPCS | Performed by: PHYSICIAN ASSISTANT

## 2025-03-03 PROCEDURE — G8417 CALC BMI ABV UP PARAM F/U: HCPCS | Performed by: PHYSICIAN ASSISTANT

## 2025-03-03 RX ORDER — CYCLOBENZAPRINE HCL 5 MG
5 TABLET ORAL NIGHTLY PRN
Qty: 10 TABLET | Refills: 0 | Status: SHIPPED | OUTPATIENT
Start: 2025-03-03 | End: 2025-03-13

## 2025-03-03 RX ORDER — PREGABALIN 25 MG/1
25 CAPSULE ORAL 2 TIMES DAILY
Qty: 60 CAPSULE | Refills: 2 | Status: SHIPPED | OUTPATIENT
Start: 2025-03-03 | End: 2025-06-01

## 2025-03-03 RX ORDER — LIDOCAINE 50 MG/G
1 PATCH TOPICAL EVERY 12 HOURS
Qty: 30 PATCH | Refills: 0 | Status: SHIPPED | OUTPATIENT
Start: 2025-03-03

## 2025-03-03 NOTE — PROGRESS NOTES
Follow up: LUMBAR SPINE    CHIEF COMPLAINT:    Chief Complaint   Patient presents with    Follow-up     LUMBAR SPINE        HISTORY OF PRESENT ILLNESS:       Ms. Tosin Pa  is a pleasant 59 y.o. female, h/o DM, breast cancer, ASA tx, here to follow-up after for chronic worsening low back and radiating leg pain.  She reports a history of chronic underlying aching low back pain radiating into the left > right posterior lateral thigh/calf to the feet.  At times she will also have left anterior thigh pain.  She reports chronic numbness and tingling in her feet and reports a history of neuropathy following breast cancer treatment.  Recent lumbar MRI has shown a large L4-5 central HNP with severe central stenosis.  Her symptoms have been more severe over the last 4 months.  Her pain is increased with any walking or standing.  She reports some relief with sitting and resting.  She currently reports 10% improvement with 2nd ASHLEY and overall 60-70% benefit from onset. She did have a surgical consult with Dr. Velazquez whom recommended second ASHLEY prior to surgery--but could consider a microlumbar discectomy.  Due to Dr. Velazquez retiring she did see Dr. Wan Molina who recommended proceeding with lumbar decompression and fusion.  She is very hesitant to consider a lumbar fusion at this time.  Other conservative care includes evaluation at the ED, Robaxin, oxycodone, ibuprofen, IM Toradol, Lyrica, Cymbalta, MDP.  She still reports residual lower back pain radiating into the left buttock and at times the anterior thigh/calf.  She denies any progressive extremity weakness.  Denies any recent bowel or bladder dysfunction or saddle anesthesia.  Denies any recent injury--states she did fall from a chair back in August.  Denies any recent fevers or infections.  She denies any significant side effects of the procedure.    She does note improvement taking Lyrica--experience some dizziness with the 75 mg dose but does well with 25 mg

## 2025-03-15 DIAGNOSIS — E11.9 TYPE 2 DIABETES MELLITUS WITHOUT COMPLICATION, WITH LONG-TERM CURRENT USE OF INSULIN: ICD-10-CM

## 2025-03-15 DIAGNOSIS — I10 ESSENTIAL HYPERTENSION: ICD-10-CM

## 2025-03-15 DIAGNOSIS — E11.21 TYPE 2 DIABETES MELLITUS WITH DIABETIC NEPHROPATHY, WITH LONG-TERM CURRENT USE OF INSULIN (HCC): ICD-10-CM

## 2025-03-15 DIAGNOSIS — E11.3299 TYPE 2 DIABETES MELLITUS WITH BACKGROUND RETINOPATHY (HCC): ICD-10-CM

## 2025-03-15 DIAGNOSIS — Z79.4 TYPE 2 DIABETES MELLITUS WITHOUT COMPLICATION, WITH LONG-TERM CURRENT USE OF INSULIN: ICD-10-CM

## 2025-03-15 DIAGNOSIS — Z79.4 TYPE 2 DIABETES MELLITUS WITH DIABETIC NEPHROPATHY, WITH LONG-TERM CURRENT USE OF INSULIN (HCC): ICD-10-CM

## 2025-03-15 LAB
25(OH)D3 SERPL-MCNC: 30.9 NG/ML
ALBUMIN SERPL-MCNC: 4.3 G/DL (ref 3.4–5)
ALBUMIN/GLOB SERPL: 1.5 {RATIO} (ref 1.1–2.2)
ALP SERPL-CCNC: 180 U/L (ref 40–129)
ALT SERPL-CCNC: 23 U/L (ref 10–40)
ANION GAP SERPL CALCULATED.3IONS-SCNC: 11 MMOL/L (ref 3–16)
AST SERPL-CCNC: 19 U/L (ref 15–37)
BILIRUB SERPL-MCNC: <0.2 MG/DL (ref 0–1)
BUN SERPL-MCNC: 26 MG/DL (ref 7–20)
CALCIUM SERPL-MCNC: 9.5 MG/DL (ref 8.3–10.6)
CHLORIDE SERPL-SCNC: 99 MMOL/L (ref 99–110)
CHOLEST SERPL-MCNC: 163 MG/DL (ref 0–199)
CO2 SERPL-SCNC: 25 MMOL/L (ref 21–32)
CREAT SERPL-MCNC: 0.9 MG/DL (ref 0.6–1.1)
CREAT UR-MCNC: 37.1 MG/DL (ref 28–259)
GFR SERPLBLD CREATININE-BSD FMLA CKD-EPI: 74 ML/MIN/{1.73_M2}
GLUCOSE SERPL-MCNC: 172 MG/DL (ref 70–99)
HDLC SERPL-MCNC: 73 MG/DL (ref 40–60)
LDLC SERPL CALC-MCNC: 77 MG/DL
MICROALBUMIN UR DL<=1MG/L-MCNC: <1.2 MG/DL
MICROALBUMIN/CREAT UR: NORMAL MG/G (ref 0–30)
POTASSIUM SERPL-SCNC: 4.7 MMOL/L (ref 3.5–5.1)
PROT SERPL-MCNC: 7.2 G/DL (ref 6.4–8.2)
SODIUM SERPL-SCNC: 135 MMOL/L (ref 136–145)
TRIGL SERPL-MCNC: 63 MG/DL (ref 0–150)
VLDLC SERPL CALC-MCNC: 13 MG/DL

## 2025-03-16 ENCOUNTER — RESULTS FOLLOW-UP (OUTPATIENT)
Dept: ENDOCRINOLOGY | Age: 60
End: 2025-03-16

## 2025-03-16 LAB
EST. AVERAGE GLUCOSE BLD GHB EST-MCNC: 162.8 MG/DL
HBA1C MFR BLD: 7.3 %

## 2025-03-17 ENCOUNTER — OFFICE VISIT (OUTPATIENT)
Dept: ENDOCRINOLOGY | Age: 60
End: 2025-03-17
Payer: COMMERCIAL

## 2025-03-17 VITALS
HEIGHT: 67 IN | OXYGEN SATURATION: 99 % | BODY MASS INDEX: 32.49 KG/M2 | WEIGHT: 207 LBS | SYSTOLIC BLOOD PRESSURE: 117 MMHG | HEART RATE: 88 BPM | DIASTOLIC BLOOD PRESSURE: 75 MMHG

## 2025-03-17 DIAGNOSIS — E11.21 TYPE 2 DIABETES MELLITUS WITH DIABETIC NEPHROPATHY, WITH LONG-TERM CURRENT USE OF INSULIN (HCC): Primary | ICD-10-CM

## 2025-03-17 DIAGNOSIS — I10 ESSENTIAL HYPERTENSION: ICD-10-CM

## 2025-03-17 DIAGNOSIS — T50.905A DRUG-INDUCED NAUSEA AND VOMITING: ICD-10-CM

## 2025-03-17 DIAGNOSIS — Z79.4 TYPE 2 DIABETES MELLITUS WITH DIABETIC NEPHROPATHY, WITH LONG-TERM CURRENT USE OF INSULIN (HCC): Primary | ICD-10-CM

## 2025-03-17 DIAGNOSIS — E11.9 TYPE 2 DIABETES MELLITUS WITHOUT COMPLICATION, WITH LONG-TERM CURRENT USE OF INSULIN: ICD-10-CM

## 2025-03-17 DIAGNOSIS — E55.9 VITAMIN D DEFICIENCY: ICD-10-CM

## 2025-03-17 DIAGNOSIS — R11.2 DRUG-INDUCED NAUSEA AND VOMITING: ICD-10-CM

## 2025-03-17 DIAGNOSIS — Z79.4 TYPE 2 DIABETES MELLITUS WITHOUT COMPLICATION, WITH LONG-TERM CURRENT USE OF INSULIN: ICD-10-CM

## 2025-03-17 DIAGNOSIS — E11.3299 TYPE 2 DIABETES MELLITUS WITH BACKGROUND RETINOPATHY (HCC): ICD-10-CM

## 2025-03-17 PROCEDURE — 3051F HG A1C>EQUAL 7.0%<8.0%: CPT | Performed by: INTERNAL MEDICINE

## 2025-03-17 PROCEDURE — G8427 DOCREV CUR MEDS BY ELIG CLIN: HCPCS | Performed by: INTERNAL MEDICINE

## 2025-03-17 PROCEDURE — 2022F DILAT RTA XM EVC RTNOPTHY: CPT | Performed by: INTERNAL MEDICINE

## 2025-03-17 PROCEDURE — G8417 CALC BMI ABV UP PARAM F/U: HCPCS | Performed by: INTERNAL MEDICINE

## 2025-03-17 PROCEDURE — 3017F COLORECTAL CA SCREEN DOC REV: CPT | Performed by: INTERNAL MEDICINE

## 2025-03-17 PROCEDURE — 3074F SYST BP LT 130 MM HG: CPT | Performed by: INTERNAL MEDICINE

## 2025-03-17 PROCEDURE — 1036F TOBACCO NON-USER: CPT | Performed by: INTERNAL MEDICINE

## 2025-03-17 PROCEDURE — 3078F DIAST BP <80 MM HG: CPT | Performed by: INTERNAL MEDICINE

## 2025-03-17 PROCEDURE — 99214 OFFICE O/P EST MOD 30 MIN: CPT | Performed by: INTERNAL MEDICINE

## 2025-03-17 PROCEDURE — G9899 SCRN MAM PERF RSLTS DOC: HCPCS | Performed by: INTERNAL MEDICINE

## 2025-03-17 RX ORDER — ERGOCALCIFEROL 1.25 MG/1
50000 CAPSULE, LIQUID FILLED ORAL
Qty: 26 CAPSULE | Refills: 3 | Status: SHIPPED | OUTPATIENT
Start: 2025-03-17

## 2025-03-17 RX ORDER — METFORMIN HYDROCHLORIDE 500 MG/1
TABLET, EXTENDED RELEASE ORAL
Qty: 120 TABLET | Refills: 5 | Status: SHIPPED | OUTPATIENT
Start: 2025-03-17

## 2025-03-17 NOTE — PROGRESS NOTES
Instructions to perform the following types of exercise:  Swimming or water aerobic exercise  Brisk walking  Playing tennis  Stationary bicycle or elliptical indoor  Low impact aerobic exercise    Instructions given to exercise for the following duration:  30 minutes a day for five-seven days per week.    Following instructions for being active throughout the day in addition to formal exercise:  Walk instead of drive whenever possible  Take the stairs instead of the elevator  Work in the garden  Park to the far end of the parking lot to add more walking steps to destination      Electronically signed by AMINATA JUAREZ MD on 3/17/2025 at 12:44 PM

## 2025-04-06 DIAGNOSIS — E11.9 TYPE 2 DIABETES MELLITUS WITHOUT COMPLICATION, WITH LONG-TERM CURRENT USE OF INSULIN: ICD-10-CM

## 2025-04-06 DIAGNOSIS — Z79.4 TYPE 2 DIABETES MELLITUS WITH DIABETIC NEPHROPATHY, WITH LONG-TERM CURRENT USE OF INSULIN (HCC): ICD-10-CM

## 2025-04-06 DIAGNOSIS — E11.21 TYPE 2 DIABETES MELLITUS WITH DIABETIC NEPHROPATHY, WITH LONG-TERM CURRENT USE OF INSULIN (HCC): ICD-10-CM

## 2025-04-06 DIAGNOSIS — Z79.4 TYPE 2 DIABETES MELLITUS WITHOUT COMPLICATION, WITH LONG-TERM CURRENT USE OF INSULIN: ICD-10-CM

## 2025-04-07 RX ORDER — TIRZEPATIDE 7.5 MG/.5ML
INJECTION, SOLUTION SUBCUTANEOUS
Qty: 2 ML | Refills: 5 | Status: SHIPPED | OUTPATIENT
Start: 2025-04-07

## 2025-04-07 NOTE — TELEPHONE ENCOUNTER
Requested Prescriptions     Pending Prescriptions Disp Refills    MOUNJARO 7.5 MG/0.5ML SOAJ [Pharmacy Med Name: MOUNJARO 7.5 MG/0.5 ML PEN]  5     Sig: INJECT 1 PEN (7.5MG) SUBCUTANEOUSLY ONE TIME PER WEEK     Last refilled: 9/30/2024 # 2 ml with 5 refills    Lov: 3/17/2025  Nov: 6/30/2025

## 2025-05-02 DIAGNOSIS — E11.9 TYPE 2 DIABETES MELLITUS WITHOUT COMPLICATION, WITH LONG-TERM CURRENT USE OF INSULIN (HCC): ICD-10-CM

## 2025-05-02 DIAGNOSIS — E11.3299 TYPE 2 DIABETES MELLITUS WITH BACKGROUND RETINOPATHY (HCC): ICD-10-CM

## 2025-05-02 DIAGNOSIS — E11.21 TYPE 2 DIABETES MELLITUS WITH DIABETIC NEPHROPATHY, WITH LONG-TERM CURRENT USE OF INSULIN (HCC): ICD-10-CM

## 2025-05-02 DIAGNOSIS — Z79.4 TYPE 2 DIABETES MELLITUS WITH DIABETIC NEPHROPATHY, WITH LONG-TERM CURRENT USE OF INSULIN (HCC): ICD-10-CM

## 2025-05-02 DIAGNOSIS — Z79.4 TYPE 2 DIABETES MELLITUS WITHOUT COMPLICATION, WITH LONG-TERM CURRENT USE OF INSULIN (HCC): ICD-10-CM

## 2025-05-02 RX ORDER — EMPAGLIFLOZIN 25 MG/1
12.5 TABLET, FILM COATED ORAL DAILY
Qty: 15 TABLET | Refills: 5 | Status: SHIPPED | OUTPATIENT
Start: 2025-05-02

## 2025-05-02 NOTE — TELEPHONE ENCOUNTER
Requested Prescriptions     Pending Prescriptions Disp Refills    JARDIANCE 25 MG tablet [Pharmacy Med Name: JARDIANCE 25 MG TABLET] 15 tablet 2     Sig: TAKE 1/2 TABLET BY MOUTH DAILY     Last refilled: 1/27/2025 # 15 tablets with 2 refills    Lov:3/17/2025  Nov: 6/30/2025

## 2025-05-22 RX ORDER — PREGABALIN 25 MG/1
25 CAPSULE ORAL 2 TIMES DAILY
Qty: 60 CAPSULE | Refills: 2 | Status: CANCELLED | OUTPATIENT
Start: 2025-05-22 | End: 2025-08-20

## 2025-06-02 ENCOUNTER — OFFICE VISIT (OUTPATIENT)
Dept: ORTHOPEDIC SURGERY | Age: 60
End: 2025-06-02
Payer: COMMERCIAL

## 2025-06-02 VITALS — HEIGHT: 67 IN | WEIGHT: 207 LBS | BODY MASS INDEX: 32.49 KG/M2

## 2025-06-02 DIAGNOSIS — M54.42 CHRONIC BILATERAL LOW BACK PAIN WITH BILATERAL SCIATICA: ICD-10-CM

## 2025-06-02 DIAGNOSIS — G89.29 CHRONIC BILATERAL LOW BACK PAIN WITH BILATERAL SCIATICA: ICD-10-CM

## 2025-06-02 DIAGNOSIS — M51.16 LUMBAR DISC HERNIATION WITH RADICULOPATHY: ICD-10-CM

## 2025-06-02 DIAGNOSIS — M48.062 LUMBAR STENOSIS WITH NEUROGENIC CLAUDICATION: ICD-10-CM

## 2025-06-02 DIAGNOSIS — M54.41 CHRONIC BILATERAL LOW BACK PAIN WITH BILATERAL SCIATICA: ICD-10-CM

## 2025-06-02 DIAGNOSIS — M51.26 HNP (HERNIATED NUCLEUS PULPOSUS), LUMBAR: ICD-10-CM

## 2025-06-02 PROCEDURE — G8417 CALC BMI ABV UP PARAM F/U: HCPCS | Performed by: PHYSICIAN ASSISTANT

## 2025-06-02 PROCEDURE — 3017F COLORECTAL CA SCREEN DOC REV: CPT | Performed by: PHYSICIAN ASSISTANT

## 2025-06-02 PROCEDURE — G8427 DOCREV CUR MEDS BY ELIG CLIN: HCPCS | Performed by: PHYSICIAN ASSISTANT

## 2025-06-02 PROCEDURE — 1036F TOBACCO NON-USER: CPT | Performed by: PHYSICIAN ASSISTANT

## 2025-06-02 PROCEDURE — 99214 OFFICE O/P EST MOD 30 MIN: CPT | Performed by: PHYSICIAN ASSISTANT

## 2025-06-02 PROCEDURE — G9899 SCRN MAM PERF RSLTS DOC: HCPCS | Performed by: PHYSICIAN ASSISTANT

## 2025-06-02 RX ORDER — LIDOCAINE 50 MG/G
PATCH TOPICAL
Qty: 30 PATCH | Refills: 2 | Status: SHIPPED | OUTPATIENT
Start: 2025-06-02

## 2025-06-02 NOTE — PROGRESS NOTES
& Affect:The patient's mood and affect are appropriate.  Lymphatic: The lymphatic examination bilaterally reveals all areas to be without enlargement or induration  Sensation: Sensation is intact without deficit    LUMBAR/SACRAL EXAMINATION:  Inspection: Local inspection shows no step-off or bruising.  Lumbar alignment is normal.  Sagittal and Coronal balance is neutral.      Palpation:   Positive tenderness left of midline around L4-5  Range of Motion: Mild loss of flexion moderate loss extension more pain with extension  Strength:   Strength testing is 5/5 in all muscle groups tested.   Special Tests:   Straight leg raise and crossed SLR negative.   Reflexes: Reflexes are symmetrically 1-2+ at the patellar and ankle tendons.   Gait & station: normal, patient ambulates without assistance      Diagnostic Testing:    Dr. Jesse cox reviewed--discussed lumbar decompression versus lumbar decompression and posterior fusion    Dr. Marcus cox reviewed    Op note reviewed January 2025    Op note reviewed December 2024    Lumbar MRI scan report dependently reviewed November 2024 showing a large central L4-5 HNP causing severe central stenosis, left L3-4 HNP, multilevel DDD/spondylosis and facet arthropathy.  Simple right renal cyst.      Labs reviewed November 2024 last hemoglobin A1c 7.1    X-rays: X-rays of the lumbar spine that were obtained on 10/30/2024 were independently reviewed with the patient which shows no acute fractures or subluxation with degenerative changes throughout the lumbar spine most noted at L2-3.    MRI: MRI of the lumbar spine that was obtained on 10/28/2022 was independently reviewed with the patient which shows at L2-3 a broad-based disc bulge which is asymmetric to the left which results in severe spinal canal stenosis.  There is moderate left and mild right neuroforaminal stenosis at L2-3.  At L3-4 there is broad-based disc bulge which is asymmetric to the left along with ligamentous flavum

## 2025-06-13 ENCOUNTER — TELEPHONE (OUTPATIENT)
Dept: ENDOCRINOLOGY | Age: 60
End: 2025-06-13

## 2025-07-28 ENCOUNTER — OFFICE VISIT (OUTPATIENT)
Dept: ENDOCRINOLOGY | Age: 60
End: 2025-07-28
Payer: COMMERCIAL

## 2025-07-28 VITALS
DIASTOLIC BLOOD PRESSURE: 70 MMHG | OXYGEN SATURATION: 98 % | HEIGHT: 67 IN | SYSTOLIC BLOOD PRESSURE: 128 MMHG | BODY MASS INDEX: 30.98 KG/M2 | WEIGHT: 197.4 LBS | HEART RATE: 79 BPM

## 2025-07-28 DIAGNOSIS — E11.3299 TYPE 2 DIABETES MELLITUS WITH BACKGROUND RETINOPATHY (HCC): ICD-10-CM

## 2025-07-28 DIAGNOSIS — E11.21 TYPE 2 DIABETES MELLITUS WITH DIABETIC NEPHROPATHY, WITH LONG-TERM CURRENT USE OF INSULIN (HCC): ICD-10-CM

## 2025-07-28 DIAGNOSIS — E11.9 TYPE 2 DIABETES MELLITUS WITHOUT COMPLICATION, WITH LONG-TERM CURRENT USE OF INSULIN (HCC): ICD-10-CM

## 2025-07-28 DIAGNOSIS — Z79.4 TYPE 2 DIABETES MELLITUS WITH DIABETIC NEPHROPATHY, WITH LONG-TERM CURRENT USE OF INSULIN (HCC): ICD-10-CM

## 2025-07-28 DIAGNOSIS — B37.49 CANDIDA UTI: Primary | ICD-10-CM

## 2025-07-28 DIAGNOSIS — Z79.4 TYPE 2 DIABETES MELLITUS WITHOUT COMPLICATION, WITH LONG-TERM CURRENT USE OF INSULIN (HCC): ICD-10-CM

## 2025-07-28 DIAGNOSIS — E55.9 VITAMIN D DEFICIENCY: ICD-10-CM

## 2025-07-28 PROCEDURE — G8427 DOCREV CUR MEDS BY ELIG CLIN: HCPCS | Performed by: INTERNAL MEDICINE

## 2025-07-28 PROCEDURE — 3052F HG A1C>EQUAL 8.0%<EQUAL 9.0%: CPT | Performed by: INTERNAL MEDICINE

## 2025-07-28 PROCEDURE — G9899 SCRN MAM PERF RSLTS DOC: HCPCS | Performed by: INTERNAL MEDICINE

## 2025-07-28 PROCEDURE — 3017F COLORECTAL CA SCREEN DOC REV: CPT | Performed by: INTERNAL MEDICINE

## 2025-07-28 PROCEDURE — 3074F SYST BP LT 130 MM HG: CPT | Performed by: INTERNAL MEDICINE

## 2025-07-28 PROCEDURE — 3078F DIAST BP <80 MM HG: CPT | Performed by: INTERNAL MEDICINE

## 2025-07-28 PROCEDURE — 2022F DILAT RTA XM EVC RTNOPTHY: CPT | Performed by: INTERNAL MEDICINE

## 2025-07-28 PROCEDURE — 99214 OFFICE O/P EST MOD 30 MIN: CPT | Performed by: INTERNAL MEDICINE

## 2025-07-28 PROCEDURE — 1036F TOBACCO NON-USER: CPT | Performed by: INTERNAL MEDICINE

## 2025-07-28 PROCEDURE — G8417 CALC BMI ABV UP PARAM F/U: HCPCS | Performed by: INTERNAL MEDICINE

## 2025-07-28 RX ORDER — TIRZEPATIDE 7.5 MG/.5ML
INJECTION, SOLUTION SUBCUTANEOUS
Qty: 6 ML | Refills: 1 | Status: SHIPPED | OUTPATIENT
Start: 2025-07-28

## 2025-07-28 RX ORDER — FLUCONAZOLE 150 MG/1
150 TABLET ORAL
Qty: 2 TABLET | Refills: 1 | Status: SHIPPED | OUTPATIENT
Start: 2025-07-28 | End: 2025-10-26

## 2025-07-28 RX ORDER — ERGOCALCIFEROL 1.25 MG/1
50000 CAPSULE, LIQUID FILLED ORAL
Qty: 26 CAPSULE | Refills: 3 | Status: SHIPPED | OUTPATIENT
Start: 2025-07-28

## 2025-07-28 NOTE — PROGRESS NOTES
lobular carcinoma of breast in female (HCC) 4/21/2023    Iron deficiency anemia 11/22/2020    Normal colonoscopy, mennorhagia    Iron deficiency anemia 11/22/2020    Rupture of biceps tendon 6/19/2017    Type II or unspecified type diabetes mellitus without mention of complication, not stated as uncontrolled     Urogenital trichomoniasis        Past Surgical History:   Procedure Laterality Date    CATARACT REMOVAL WITH IMPLANT      COLONOSCOPY  5/23/16    incomplete, Dr Martin    WISDOM TOOTH EXTRACTION           Allergies   Allergen Reactions    Gabapentin Nausea And Vomiting         Current Outpatient Medications:     lidocaine (LIDODERM) 5 %, 1 PATCH DAILY PRN, Disp: 30 patch, Rfl: 2    empagliflozin (JARDIANCE) 25 MG tablet, TAKE 1/2 TABLET BY MOUTH DAILY, Disp: 15 tablet, Rfl: 5    MOUNJARO 7.5 MG/0.5ML SOAJ, INJECT 1 PEN (7.5MG) SUBCUTANEOUSLY ONE TIME PER WEEK, Disp: 2 mL, Rfl: 5    vitamin D (ERGOCALCIFEROL) 1.25 MG (73042 UT) CAPS capsule, Take 1 capsule by mouth Twice a Week, Disp: 26 capsule, Rfl: 3    metFORMIN (GLUCOPHAGE-XR) 500 MG extended release tablet, TAKE 2 TABLETS BY MOUTH TWICE A DAY WITH MEALS- REFILLS WILL BE GIVEN AT NEXT OFFICE VISIT, Disp: 120 tablet, Rfl: 5    pregabalin (LYRICA) 25 MG capsule, Take 1 capsule by mouth 2 times daily for 90 days. Max Daily Amount: 50 mg, Disp: 60 capsule, Rfl: 2    lidocaine (LIDODERM) 5 %, Place 1 patch onto the skin in the morning and 1 patch in the evening. 12 hours on, 12 hours off.., Disp: 30 patch, Rfl: 0    spironolactone (ALDACTONE) 50 MG tablet, TAKE 1 TABLET BY MOUTH TWICE A DAY, Disp: 60 tablet, Rfl: 3    Capsaicin 0.1 % CREA, APPLY THIN FILM TOPICALLY EVERY 8 HOURS AS NEEDED FOR PAIN., Disp: , Rfl:     letrozole (FEMARA) 2.5 MG tablet, Take 1 tablet by mouth daily, Disp: , Rfl:     mupirocin (BACTROBAN) 2 % ointment, APPLY TO AFFECTED AREA 3 TIMES A DAY, Disp: , Rfl:     KLAYESTA 184602 UNIT/GM powder, Apply topically 2 times daily APPLY TO

## 2025-08-18 ENCOUNTER — OFFICE VISIT (OUTPATIENT)
Dept: ORTHOPEDIC SURGERY | Age: 60
End: 2025-08-18
Payer: COMMERCIAL

## 2025-08-18 VITALS — WEIGHT: 197 LBS | BODY MASS INDEX: 30.92 KG/M2 | HEIGHT: 67 IN

## 2025-08-18 DIAGNOSIS — M48.062 LUMBAR STENOSIS WITH NEUROGENIC CLAUDICATION: ICD-10-CM

## 2025-08-18 DIAGNOSIS — M51.361 DEGENERATION OF INTERVERTEBRAL DISC OF LUMBAR REGION WITH LOWER EXTREMITY PAIN: ICD-10-CM

## 2025-08-18 DIAGNOSIS — M54.41 CHRONIC BILATERAL LOW BACK PAIN WITH BILATERAL SCIATICA: Primary | ICD-10-CM

## 2025-08-18 DIAGNOSIS — M51.26 HNP (HERNIATED NUCLEUS PULPOSUS), LUMBAR: ICD-10-CM

## 2025-08-18 DIAGNOSIS — M54.42 CHRONIC BILATERAL LOW BACK PAIN WITH BILATERAL SCIATICA: Primary | ICD-10-CM

## 2025-08-18 DIAGNOSIS — G89.29 CHRONIC BILATERAL LOW BACK PAIN WITH BILATERAL SCIATICA: Primary | ICD-10-CM

## 2025-08-18 PROCEDURE — G8427 DOCREV CUR MEDS BY ELIG CLIN: HCPCS | Performed by: PHYSICIAN ASSISTANT

## 2025-08-18 PROCEDURE — 1036F TOBACCO NON-USER: CPT | Performed by: PHYSICIAN ASSISTANT

## 2025-08-18 PROCEDURE — G8417 CALC BMI ABV UP PARAM F/U: HCPCS | Performed by: PHYSICIAN ASSISTANT

## 2025-08-18 PROCEDURE — G9899 SCRN MAM PERF RSLTS DOC: HCPCS | Performed by: PHYSICIAN ASSISTANT

## 2025-08-18 PROCEDURE — 99213 OFFICE O/P EST LOW 20 MIN: CPT | Performed by: PHYSICIAN ASSISTANT

## 2025-08-18 PROCEDURE — 3017F COLORECTAL CA SCREEN DOC REV: CPT | Performed by: PHYSICIAN ASSISTANT

## 2025-08-18 RX ORDER — LIDOCAINE 50 MG/G
PATCH TOPICAL
Qty: 30 PATCH | Refills: 0 | Status: SHIPPED | OUTPATIENT
Start: 2025-08-18

## 2025-08-28 DIAGNOSIS — I10 ESSENTIAL HYPERTENSION: ICD-10-CM

## 2025-08-29 ENCOUNTER — TELEPHONE (OUTPATIENT)
Dept: ENDOCRINOLOGY | Age: 60
End: 2025-08-29

## 2025-08-29 DIAGNOSIS — Z79.4 TYPE 2 DIABETES MELLITUS WITH DIABETIC NEPHROPATHY, WITH LONG-TERM CURRENT USE OF INSULIN (HCC): ICD-10-CM

## 2025-08-29 DIAGNOSIS — E11.21 TYPE 2 DIABETES MELLITUS WITH DIABETIC NEPHROPATHY, WITH LONG-TERM CURRENT USE OF INSULIN (HCC): ICD-10-CM

## 2025-08-29 DIAGNOSIS — E11.3299 TYPE 2 DIABETES MELLITUS WITH BACKGROUND RETINOPATHY (HCC): ICD-10-CM

## 2025-08-29 DIAGNOSIS — Z79.4 TYPE 2 DIABETES MELLITUS WITHOUT COMPLICATION, WITH LONG-TERM CURRENT USE OF INSULIN (HCC): Primary | ICD-10-CM

## 2025-08-29 DIAGNOSIS — E11.9 TYPE 2 DIABETES MELLITUS WITHOUT COMPLICATION, WITH LONG-TERM CURRENT USE OF INSULIN (HCC): Primary | ICD-10-CM

## 2025-08-29 RX ORDER — INSULIN GLARGINE 100 [IU]/ML
6 INJECTION, SOLUTION SUBCUTANEOUS NIGHTLY
Qty: 5 ADJUSTABLE DOSE PRE-FILLED PEN SYRINGE | Refills: 0 | Status: SHIPPED | OUTPATIENT
Start: 2025-08-29

## 2025-08-29 RX ORDER — HYDROCHLOROTHIAZIDE 12.5 MG/1
12.5 TABLET ORAL EVERY OTHER DAY
Qty: 15 TABLET | Refills: 2 | Status: SHIPPED | OUTPATIENT
Start: 2025-08-29